# Patient Record
Sex: FEMALE | Race: BLACK OR AFRICAN AMERICAN | NOT HISPANIC OR LATINO | Employment: FULL TIME | URBAN - METROPOLITAN AREA
[De-identification: names, ages, dates, MRNs, and addresses within clinical notes are randomized per-mention and may not be internally consistent; named-entity substitution may affect disease eponyms.]

---
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
## 2019-05-29 ENCOUNTER — APPOINTMENT (EMERGENCY)
Dept: RADIOLOGY | Facility: HOSPITAL | Age: 19
End: 2019-05-29
Payer: COMMERCIAL

## 2019-05-29 ENCOUNTER — HOSPITAL ENCOUNTER (EMERGENCY)
Facility: HOSPITAL | Age: 19
Discharge: HOME | End: 2019-05-29
Attending: EMERGENCY MEDICINE | Admitting: EMERGENCY MEDICINE
Payer: COMMERCIAL

## 2019-05-29 VITALS
RESPIRATION RATE: 14 BRPM | HEART RATE: 87 BPM | DIASTOLIC BLOOD PRESSURE: 79 MMHG | TEMPERATURE: 99 F | WEIGHT: 156.8 LBS | OXYGEN SATURATION: 100 % | SYSTOLIC BLOOD PRESSURE: 126 MMHG

## 2019-05-29 DIAGNOSIS — K59.00 CONSTIPATION: ICD-10-CM

## 2019-05-29 DIAGNOSIS — R42 LIGHTHEADEDNESS: ICD-10-CM

## 2019-05-29 DIAGNOSIS — R51.9 HEADACHE: Primary | ICD-10-CM

## 2019-05-29 LAB
ALBUMIN SERPL BCP-MCNC: 3.7 G/DL (ref 3.5–5)
ALP SERPL-CCNC: 89 U/L (ref 46–116)
ALT SERPL W P-5'-P-CCNC: 28 U/L (ref 12–78)
ANION GAP SERPL CALCULATED.3IONS-SCNC: 7 MMOL/L (ref 4–13)
AST SERPL W P-5'-P-CCNC: 19 U/L (ref 5–45)
B-HCG SERPL-ACNC: <2 MIU/ML
BASOPHILS # BLD AUTO: 0.03 THOUSANDS/ΜL (ref 0–0.1)
BASOPHILS NFR BLD AUTO: 1 % (ref 0–1)
BILIRUB SERPL-MCNC: 0.2 MG/DL (ref 0.2–1)
BILIRUB UR QL STRIP: NEGATIVE
BUN SERPL-MCNC: 9 MG/DL (ref 5–25)
CALCIUM SERPL-MCNC: 9.3 MG/DL (ref 8.3–10.1)
CHLORIDE SERPL-SCNC: 102 MMOL/L (ref 100–108)
CLARITY UR: CLEAR
CO2 SERPL-SCNC: 28 MMOL/L (ref 21–32)
COLOR UR: YELLOW
CREAT SERPL-MCNC: 0.94 MG/DL (ref 0.6–1.3)
EOSINOPHIL # BLD AUTO: 0.17 THOUSAND/ΜL (ref 0–0.61)
EOSINOPHIL NFR BLD AUTO: 3 % (ref 0–6)
ERYTHROCYTE [DISTWIDTH] IN BLOOD BY AUTOMATED COUNT: 12.2 % (ref 11.6–15.1)
GFR SERPL CREATININE-BSD FRML MDRD: 101 ML/MIN/1.73SQ M
GLUCOSE SERPL-MCNC: 82 MG/DL (ref 65–140)
GLUCOSE SERPL-MCNC: 83 MG/DL (ref 65–140)
GLUCOSE UR STRIP-MCNC: NEGATIVE MG/DL
HCT VFR BLD AUTO: 43.1 % (ref 34.8–46.1)
HGB BLD-MCNC: 14.2 G/DL (ref 11.5–15.4)
HGB UR QL STRIP.AUTO: NEGATIVE
IMM GRANULOCYTES # BLD AUTO: 0.01 THOUSAND/UL (ref 0–0.2)
IMM GRANULOCYTES NFR BLD AUTO: 0 % (ref 0–2)
KETONES UR STRIP-MCNC: NEGATIVE MG/DL
LEUKOCYTE ESTERASE UR QL STRIP: NEGATIVE
LIPASE SERPL-CCNC: 110 U/L (ref 73–393)
LYMPHOCYTES # BLD AUTO: 2.82 THOUSANDS/ΜL (ref 0.6–4.47)
LYMPHOCYTES NFR BLD AUTO: 47 % (ref 14–44)
MAGNESIUM SERPL-MCNC: 2.1 MG/DL (ref 1.6–2.6)
MCH RBC QN AUTO: 28.3 PG (ref 26.8–34.3)
MCHC RBC AUTO-ENTMCNC: 32.9 G/DL (ref 31.4–37.4)
MCV RBC AUTO: 86 FL (ref 82–98)
MONOCYTES # BLD AUTO: 0.46 THOUSAND/ΜL (ref 0.17–1.22)
MONOCYTES NFR BLD AUTO: 8 % (ref 4–12)
NEUTROPHILS # BLD AUTO: 2.4 THOUSANDS/ΜL (ref 1.85–7.62)
NEUTS SEG NFR BLD AUTO: 41 % (ref 43–75)
NITRITE UR QL STRIP: NEGATIVE
NRBC BLD AUTO-RTO: 0 /100 WBCS
PH UR STRIP.AUTO: 7 [PH] (ref 4.5–8)
PLATELET # BLD AUTO: 275 THOUSANDS/UL (ref 149–390)
PMV BLD AUTO: 9.9 FL (ref 8.9–12.7)
POTASSIUM SERPL-SCNC: 3.8 MMOL/L (ref 3.5–5.3)
PROT SERPL-MCNC: 8.1 G/DL (ref 6.4–8.2)
PROT UR STRIP-MCNC: NEGATIVE MG/DL
RBC # BLD AUTO: 5.01 MILLION/UL (ref 3.81–5.12)
SODIUM SERPL-SCNC: 137 MMOL/L (ref 136–145)
SP GR UR STRIP.AUTO: 1.01 (ref 1–1.03)
UROBILINOGEN UR QL STRIP.AUTO: 0.2 E.U./DL
WBC # BLD AUTO: 5.89 THOUSAND/UL (ref 4.31–10.16)

## 2019-05-29 PROCEDURE — 83735 ASSAY OF MAGNESIUM: CPT | Performed by: PHYSICIAN ASSISTANT

## 2019-05-29 PROCEDURE — 96361 HYDRATE IV INFUSION ADD-ON: CPT

## 2019-05-29 PROCEDURE — 83690 ASSAY OF LIPASE: CPT | Performed by: PHYSICIAN ASSISTANT

## 2019-05-29 PROCEDURE — 74018 RADEX ABDOMEN 1 VIEW: CPT

## 2019-05-29 PROCEDURE — 81003 URINALYSIS AUTO W/O SCOPE: CPT

## 2019-05-29 PROCEDURE — 99284 EMERGENCY DEPT VISIT MOD MDM: CPT | Performed by: PHYSICIAN ASSISTANT

## 2019-05-29 PROCEDURE — 80053 COMPREHEN METABOLIC PANEL: CPT | Performed by: PHYSICIAN ASSISTANT

## 2019-05-29 PROCEDURE — 96375 TX/PRO/DX INJ NEW DRUG ADDON: CPT

## 2019-05-29 PROCEDURE — 36415 COLL VENOUS BLD VENIPUNCTURE: CPT | Performed by: PHYSICIAN ASSISTANT

## 2019-05-29 PROCEDURE — 84702 CHORIONIC GONADOTROPIN TEST: CPT | Performed by: PHYSICIAN ASSISTANT

## 2019-05-29 PROCEDURE — 82948 REAGENT STRIP/BLOOD GLUCOSE: CPT

## 2019-05-29 PROCEDURE — 96374 THER/PROPH/DIAG INJ IV PUSH: CPT

## 2019-05-29 PROCEDURE — 99284 EMERGENCY DEPT VISIT MOD MDM: CPT

## 2019-05-29 PROCEDURE — 85025 COMPLETE CBC W/AUTO DIFF WBC: CPT | Performed by: PHYSICIAN ASSISTANT

## 2019-05-29 RX ORDER — KETOROLAC TROMETHAMINE 30 MG/ML
15 INJECTION, SOLUTION INTRAMUSCULAR; INTRAVENOUS ONCE
Status: COMPLETED | OUTPATIENT
Start: 2019-05-29 | End: 2019-05-29

## 2019-05-29 RX ORDER — ONDANSETRON 2 MG/ML
4 INJECTION INTRAMUSCULAR; INTRAVENOUS ONCE
Status: COMPLETED | OUTPATIENT
Start: 2019-05-29 | End: 2019-05-29

## 2019-05-29 RX ORDER — IBUPROFEN 600 MG/1
600 TABLET ORAL EVERY 6 HOURS PRN
Qty: 5 TABLET | Refills: 0 | OUTPATIENT
Start: 2019-05-29 | End: 2020-08-26

## 2019-05-29 RX ORDER — DIPHENHYDRAMINE HYDROCHLORIDE 50 MG/ML
25 INJECTION INTRAMUSCULAR; INTRAVENOUS ONCE
Status: COMPLETED | OUTPATIENT
Start: 2019-05-29 | End: 2019-05-29

## 2019-05-29 RX ORDER — METOCLOPRAMIDE HYDROCHLORIDE 5 MG/ML
10 INJECTION INTRAMUSCULAR; INTRAVENOUS ONCE
Status: COMPLETED | OUTPATIENT
Start: 2019-05-29 | End: 2019-05-29

## 2019-05-29 RX ORDER — MAGNESIUM CARB/ALUMINUM HYDROX 105-160MG
296 TABLET,CHEWABLE ORAL ONCE
Status: COMPLETED | OUTPATIENT
Start: 2019-05-29 | End: 2019-05-29

## 2019-05-29 RX ORDER — POLYETHYLENE GLYCOL 3350 17 G/17G
17 POWDER, FOR SOLUTION ORAL DAILY
Qty: 85 G | Refills: 0 | Status: SHIPPED | OUTPATIENT
Start: 2019-05-29 | End: 2020-08-25

## 2019-05-29 RX ADMIN — DIPHENHYDRAMINE HYDROCHLORIDE 25 MG: 50 INJECTION, SOLUTION INTRAMUSCULAR; INTRAVENOUS at 20:56

## 2019-05-29 RX ADMIN — METOCLOPRAMIDE 10 MG: 5 INJECTION, SOLUTION INTRAMUSCULAR; INTRAVENOUS at 21:00

## 2019-05-29 RX ADMIN — KETOROLAC TROMETHAMINE 15 MG: 30 INJECTION, SOLUTION INTRAMUSCULAR at 20:59

## 2019-05-29 RX ADMIN — ONDANSETRON 4 MG: 2 INJECTION INTRAMUSCULAR; INTRAVENOUS at 19:36

## 2019-05-29 RX ADMIN — MAGNESIUM CITRATE 296 ML: 1.75 LIQUID ORAL at 21:37

## 2019-05-29 RX ADMIN — SODIUM CHLORIDE 1000 ML: 0.9 INJECTION, SOLUTION INTRAVENOUS at 19:39

## 2020-08-25 ENCOUNTER — APPOINTMENT (EMERGENCY)
Dept: RADIOLOGY | Facility: HOSPITAL | Age: 20
End: 2020-08-25
Payer: COMMERCIAL

## 2020-08-25 ENCOUNTER — HOSPITAL ENCOUNTER (EMERGENCY)
Facility: HOSPITAL | Age: 20
Discharge: HOME | End: 2020-08-26
Attending: EMERGENCY MEDICINE | Admitting: EMERGENCY MEDICINE
Payer: COMMERCIAL

## 2020-08-25 DIAGNOSIS — N39.0 UTI (URINARY TRACT INFECTION): ICD-10-CM

## 2020-08-25 DIAGNOSIS — B34.9 ACUTE VIRAL SYNDROME: Primary | ICD-10-CM

## 2020-08-25 LAB
ALBUMIN SERPL BCP-MCNC: 4.2 G/DL (ref 3.4–4.8)
ALP SERPL-CCNC: 62.3 U/L (ref 35–140)
ALT SERPL W P-5'-P-CCNC: 11 U/L (ref 5–54)
ANION GAP SERPL CALCULATED.3IONS-SCNC: 10 MMOL/L (ref 4–13)
AST SERPL W P-5'-P-CCNC: 14 U/L (ref 15–41)
BACTERIA UR QL AUTO: ABNORMAL /HPF
BASOPHILS # BLD AUTO: 0.03 THOUSANDS/ΜL (ref 0–0.1)
BASOPHILS NFR BLD AUTO: 0 % (ref 0–1)
BILIRUB SERPL-MCNC: 0.47 MG/DL (ref 0.3–1.2)
BILIRUB UR QL STRIP: ABNORMAL
BUN SERPL-MCNC: 9 MG/DL (ref 6–20)
CALCIUM SERPL-MCNC: 9.2 MG/DL (ref 8.4–10.2)
CHLORIDE SERPL-SCNC: 103 MMOL/L (ref 96–108)
CLARITY UR: CLEAR
CO2 SERPL-SCNC: 22 MMOL/L (ref 22–33)
COLOR UR: YELLOW
CREAT SERPL-MCNC: 0.94 MG/DL (ref 0.4–1.1)
EOSINOPHIL # BLD AUTO: 0.06 THOUSAND/ΜL (ref 0–0.61)
EOSINOPHIL NFR BLD AUTO: 1 % (ref 0–6)
ERYTHROCYTE [DISTWIDTH] IN BLOOD BY AUTOMATED COUNT: 12.5 % (ref 11.6–15.1)
EXT PREG TEST URINE: NEGATIVE
EXT. CONTROL ED NAV: NORMAL
GFR SERPL CREATININE-BSD FRML MDRD: 100 ML/MIN/1.73SQ M
GLUCOSE SERPL-MCNC: 95 MG/DL (ref 65–140)
GLUCOSE UR STRIP-MCNC: NEGATIVE MG/DL
HCT VFR BLD AUTO: 41.4 % (ref 34.8–46.1)
HGB BLD-MCNC: 13.8 G/DL (ref 11.5–15.4)
HGB UR QL STRIP.AUTO: NEGATIVE
IMM GRANULOCYTES # BLD AUTO: 0.02 THOUSAND/UL (ref 0–0.2)
IMM GRANULOCYTES NFR BLD AUTO: 0 % (ref 0–2)
KETONES UR STRIP-MCNC: ABNORMAL MG/DL
LACTATE SERPL-SCNC: 0.6 MMOL/L (ref 0–2)
LEUKOCYTE ESTERASE UR QL STRIP: NEGATIVE
LIPASE SERPL-CCNC: 10 U/L (ref 13–60)
LYMPHOCYTES # BLD AUTO: 1.61 THOUSANDS/ΜL (ref 0.6–4.47)
LYMPHOCYTES NFR BLD AUTO: 13 % (ref 14–44)
MCH RBC QN AUTO: 27.8 PG (ref 26.8–34.3)
MCHC RBC AUTO-ENTMCNC: 33.3 G/DL (ref 31.4–37.4)
MCV RBC AUTO: 83 FL (ref 82–98)
MONOCYTES # BLD AUTO: 1.32 THOUSAND/ΜL (ref 0.17–1.22)
MONOCYTES NFR BLD AUTO: 10 % (ref 4–12)
NEUTROPHILS # BLD AUTO: 9.84 THOUSANDS/ΜL (ref 1.85–7.62)
NEUTS SEG NFR BLD AUTO: 76 % (ref 43–75)
NITRITE UR QL STRIP: NEGATIVE
NON-SQ EPI CELLS URNS QL MICRO: ABNORMAL /HPF
PH UR STRIP.AUTO: 6.5 [PH]
PLATELET # BLD AUTO: 231 THOUSANDS/UL (ref 149–390)
PMV BLD AUTO: 10.5 FL (ref 8.9–12.7)
POTASSIUM SERPL-SCNC: 4 MMOL/L (ref 3.5–5)
PROT SERPL-MCNC: 7.4 G/DL (ref 6.4–8.3)
PROT UR STRIP-MCNC: ABNORMAL MG/DL
RBC # BLD AUTO: 4.97 MILLION/UL (ref 3.81–5.12)
RBC #/AREA URNS AUTO: ABNORMAL /HPF
SODIUM SERPL-SCNC: 135 MMOL/L (ref 133–145)
SP GR UR STRIP.AUTO: 1.02 (ref 1–1.03)
UROBILINOGEN UR QL STRIP.AUTO: 0.2 E.U./DL
WBC # BLD AUTO: 12.88 THOUSAND/UL (ref 4.31–10.16)
WBC #/AREA URNS AUTO: ABNORMAL /HPF

## 2020-08-25 PROCEDURE — 99284 EMERGENCY DEPT VISIT MOD MDM: CPT

## 2020-08-25 PROCEDURE — 81001 URINALYSIS AUTO W/SCOPE: CPT | Performed by: EMERGENCY MEDICINE

## 2020-08-25 PROCEDURE — 80053 COMPREHEN METABOLIC PANEL: CPT | Performed by: EMERGENCY MEDICINE

## 2020-08-25 PROCEDURE — 83605 ASSAY OF LACTIC ACID: CPT | Performed by: EMERGENCY MEDICINE

## 2020-08-25 PROCEDURE — 83690 ASSAY OF LIPASE: CPT | Performed by: EMERGENCY MEDICINE

## 2020-08-25 PROCEDURE — 96375 TX/PRO/DX INJ NEW DRUG ADDON: CPT

## 2020-08-25 PROCEDURE — 96361 HYDRATE IV INFUSION ADD-ON: CPT

## 2020-08-25 PROCEDURE — 81025 URINE PREGNANCY TEST: CPT | Performed by: EMERGENCY MEDICINE

## 2020-08-25 PROCEDURE — 99284 EMERGENCY DEPT VISIT MOD MDM: CPT | Performed by: EMERGENCY MEDICINE

## 2020-08-25 PROCEDURE — 87040 BLOOD CULTURE FOR BACTERIA: CPT | Performed by: EMERGENCY MEDICINE

## 2020-08-25 PROCEDURE — 71046 X-RAY EXAM CHEST 2 VIEWS: CPT

## 2020-08-25 PROCEDURE — 36415 COLL VENOUS BLD VENIPUNCTURE: CPT | Performed by: EMERGENCY MEDICINE

## 2020-08-25 PROCEDURE — 85025 COMPLETE CBC W/AUTO DIFF WBC: CPT | Performed by: EMERGENCY MEDICINE

## 2020-08-25 RX ORDER — ONDANSETRON 2 MG/ML
4 INJECTION INTRAMUSCULAR; INTRAVENOUS ONCE
Status: COMPLETED | OUTPATIENT
Start: 2020-08-25 | End: 2020-08-25

## 2020-08-25 RX ORDER — KETOROLAC TROMETHAMINE 30 MG/ML
30 INJECTION, SOLUTION INTRAMUSCULAR; INTRAVENOUS ONCE
Status: COMPLETED | OUTPATIENT
Start: 2020-08-25 | End: 2020-08-25

## 2020-08-25 RX ADMIN — ONDANSETRON 4 MG: 2 INJECTION INTRAMUSCULAR; INTRAVENOUS at 23:11

## 2020-08-25 RX ADMIN — KETOROLAC TROMETHAMINE 30 MG: 30 INJECTION, SOLUTION INTRAMUSCULAR at 23:11

## 2020-08-25 RX ADMIN — SODIUM CHLORIDE 1000 ML: 0.9 INJECTION, SOLUTION INTRAVENOUS at 23:12

## 2020-08-25 NOTE — Clinical Note
Lizeth Birch was seen and treated in our emergency department on 8/25/2020  Diagnosis:     Lizeth    She may return on this date:     May not return to work until covid test is negative     If you have any questions or concerns, please don't hesitate to call        Nery Conway MD    ______________________________           _______________          _______________  Hospital Representative                              Date                                Time

## 2020-08-25 NOTE — Clinical Note
Lizeth Birch was seen and treated in our emergency department on 8/25/2020  Diagnosis:     Lizeth    She may return on this date:     May not return to work until covid test is negative     If you have any questions or concerns, please don't hesitate to call        Shari Alford MD    ______________________________           _______________          _______________  Hospital Representative                              Date                                Time

## 2020-08-26 VITALS
BODY MASS INDEX: 28.37 KG/M2 | SYSTOLIC BLOOD PRESSURE: 124 MMHG | OXYGEN SATURATION: 98 % | WEIGHT: 154.2 LBS | HEART RATE: 72 BPM | HEIGHT: 62 IN | RESPIRATION RATE: 18 BRPM | DIASTOLIC BLOOD PRESSURE: 71 MMHG | TEMPERATURE: 97.8 F

## 2020-08-26 PROBLEM — N39.0 UTI (URINARY TRACT INFECTION): Status: ACTIVE | Noted: 2020-08-26

## 2020-08-26 PROBLEM — B34.9 ACUTE VIRAL SYNDROME: Status: ACTIVE | Noted: 2020-08-26

## 2020-08-26 PROCEDURE — 86308 HETEROPHILE ANTIBODY SCREEN: CPT | Performed by: EMERGENCY MEDICINE

## 2020-08-26 PROCEDURE — 96365 THER/PROPH/DIAG IV INF INIT: CPT

## 2020-08-26 PROCEDURE — 96361 HYDRATE IV INFUSION ADD-ON: CPT

## 2020-08-26 PROCEDURE — 36415 COLL VENOUS BLD VENIPUNCTURE: CPT | Performed by: EMERGENCY MEDICINE

## 2020-08-26 RX ORDER — IBUPROFEN 600 MG/1
600 TABLET ORAL EVERY 6 HOURS PRN
Qty: 60 TABLET | Refills: 0 | Status: SHIPPED | OUTPATIENT
Start: 2020-08-26 | End: 2020-09-23 | Stop reason: ALTCHOICE

## 2020-08-26 RX ORDER — CEFTRIAXONE 1 G/50ML
1000 INJECTION, SOLUTION INTRAVENOUS ONCE
Status: COMPLETED | OUTPATIENT
Start: 2020-08-26 | End: 2020-08-26

## 2020-08-26 RX ORDER — SULFAMETHOXAZOLE AND TRIMETHOPRIM 800; 160 MG/1; MG/1
1 TABLET ORAL EVERY 12 HOURS SCHEDULED
Qty: 14 TABLET | Refills: 0 | Status: SHIPPED | OUTPATIENT
Start: 2020-08-26 | End: 2020-08-26 | Stop reason: SDUPTHER

## 2020-08-26 RX ORDER — IBUPROFEN 600 MG/1
600 TABLET ORAL EVERY 6 HOURS PRN
Qty: 60 TABLET | Refills: 0 | Status: SHIPPED | OUTPATIENT
Start: 2020-08-26 | End: 2020-08-26 | Stop reason: SDUPTHER

## 2020-08-26 RX ORDER — SULFAMETHOXAZOLE AND TRIMETHOPRIM 800; 160 MG/1; MG/1
1 TABLET ORAL EVERY 12 HOURS SCHEDULED
Qty: 14 TABLET | Refills: 0 | Status: SHIPPED | OUTPATIENT
Start: 2020-08-26 | End: 2020-09-02

## 2020-08-26 RX ADMIN — CEFTRIAXONE 1000 MG: 1 INJECTION, SOLUTION INTRAVENOUS at 00:34

## 2020-08-26 RX ADMIN — SODIUM CHLORIDE 1000 ML: 0.9 INJECTION, SOLUTION INTRAVENOUS at 00:34

## 2020-08-26 NOTE — ED NOTES
Pt reports that nausea is resolved, no change in pain level at this time        Marisa Stweart RN  08/25/20 0059

## 2020-08-26 NOTE — ED PROVIDER NOTES
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 Taking? UNKNOWN TO PATIENT 8/24/2020 at Unknown time  Yes Yes   ibuprofen (MOTRIN) 600 mg tablet   No No   Sig: Take 1 tablet (600 mg total) by mouth every 6 (six) hours as needed for mild pain      Facility-Administered Medications: None       History reviewed  No pertinent past medical history  History reviewed  No pertinent surgical history  Family History   Problem Relation Age of Onset    No Known Problems Mother     No Known Problems Father      I have reviewed and agree with the history as documented  E-Cigarette/Vaping    E-Cigarette Use Never User      E-Cigarette/Vaping Substances     Social History     Tobacco Use    Smoking status: Never Smoker    Smokeless tobacco: Never Used   Substance Use Topics    Alcohol use: Never     Frequency: Never    Drug use: Never       Review of Systems   Constitutional: Positive for appetite change, chills, fatigue and fever  Negative for activity change, diaphoresis and unexpected weight change  HENT: Negative for congestion, ear discharge, ear pain, mouth sores, sinus pressure, sinus pain, sneezing, sore throat, trouble swallowing and voice change  Eyes: Negative for photophobia, pain, discharge, redness, itching and visual disturbance  Respiratory: Negative for cough, chest tightness and shortness of breath  Cardiovascular: Negative for chest pain, palpitations and leg swelling  Gastrointestinal: Positive for abdominal pain (upper abd) and nausea  Negative for constipation and vomiting  Endocrine: Negative for cold intolerance, heat intolerance, polydipsia, polyphagia and polyuria  Genitourinary: Negative for decreased urine volume, difficulty urinating, dysuria, frequency, hematuria and urgency  Musculoskeletal: Negative for arthralgias, back pain, gait problem, joint swelling, myalgias, neck pain and neck stiffness  Skin: Negative for color change and rash  Allergic/Immunologic: Negative for immunocompromised state  Neurological: Negative for dizziness, tremors, seizures, syncope, speech difficulty, weakness, light-headedness, numbness and headaches  Hematological: Does not bruise/bleed easily  Psychiatric/Behavioral: Negative for behavioral problems and suicidal ideas  Physical Exam  Physical Exam  Vitals signs and nursing note reviewed  Constitutional:       General: She is not in acute distress  Appearance: Normal appearance  She is well-developed and normal weight  She is ill-appearing  She is not toxic-appearing or diaphoretic  HENT:      Head: Normocephalic and atraumatic  Right Ear: Tympanic membrane, ear canal and external ear normal  There is no impacted cerumen  Left Ear: Tympanic membrane, ear canal and external ear normal  There is no impacted cerumen  Nose: Nose normal  No congestion or rhinorrhea  Mouth/Throat:      Mouth: Mucous membranes are moist       Pharynx: Oropharynx is clear   No oropharyngeal exudate or posterior oropharyngeal erythema  Eyes:      General: No scleral icterus  Right eye: No discharge  Left eye: No discharge  Extraocular Movements: Extraocular movements intact  Conjunctiva/sclera: Conjunctivae normal       Pupils: Pupils are equal, round, and reactive to light  Neck:      Musculoskeletal: Normal range of motion and neck supple  No neck rigidity or muscular tenderness  Thyroid: No thyromegaly  Vascular: No hepatojugular reflux or JVD  Trachea: No tracheal deviation  Cardiovascular:      Rate and Rhythm: Normal rate and regular rhythm  Pulses: Normal pulses  Carotid pulses are 2+ on the right side and 2+ on the left side  Radial pulses are 2+ on the right side and 2+ on the left side  Dorsalis pedis pulses are 2+ on the right side and 2+ on the left side  Posterior tibial pulses are 2+ on the right side and 2+ on the left side  Heart sounds: Normal heart sounds  No murmur  Pulmonary:      Effort: Pulmonary effort is normal  No accessory muscle usage or respiratory distress  Breath sounds: Normal breath sounds  No wheezing or rales  Chest:      Chest wall: No mass, deformity, tenderness, crepitus or edema  Abdominal:      General: Bowel sounds are normal  There is no distension or abdominal bruit  Palpations: Abdomen is soft  There is no hepatomegaly, splenomegaly or mass  Tenderness: There is no abdominal tenderness (RUQ)  There is right CVA tenderness and left CVA tenderness  There is no guarding or rebound  Hernia: No hernia is present  Musculoskeletal: Normal range of motion  General: No tenderness  Right lower leg: She exhibits no tenderness  No edema  Left lower leg: She exhibits no tenderness  No edema  Lymphadenopathy:      Cervical: No cervical adenopathy  Skin:     General: Skin is warm and dry  Capillary Refill: Capillary refill takes less than 2 seconds  Findings: No ecchymosis or rash  Neurological:      General: No focal deficit present  Mental Status: She is alert and oriented to person, place, and time  Cranial Nerves: No cranial nerve deficit  Sensory: No sensory deficit  Motor: No weakness or abnormal muscle tone  Deep Tendon Reflexes: Reflexes normal    Psychiatric:         Mood and Affect: Mood normal          Behavior: Behavior normal          Thought Content: Thought content normal          Judgment: Judgment normal          Vital Signs  ED Triage Vitals [08/25/20 2235]   Temperature Pulse Respirations Blood Pressure SpO2   (!) 100 8 °F (38 2 °C) (!) 119 18 121/68 100 %      Temp Source Heart Rate Source Patient Position - Orthostatic VS BP Location FiO2 (%)   Tympanic Monitor Lying Right arm --      Pain Score       8           Vitals:    08/25/20 2235 08/25/20 2358 08/26/20 0126   BP: 121/68 104/67 124/71   Pulse: (!) 119 95 72   Patient Position - Orthostatic VS: Lying Lying          Visual Acuity      ED Medications  Medications   sodium chloride 0 9 % bolus 1,000 mL (0 mL Intravenous Stopped 8/26/20 0031)   ketorolac (TORADOL) injection 30 mg (30 mg Intravenous Given 8/25/20 2311)   ondansetron (ZOFRAN) injection 4 mg (4 mg Intravenous Given 8/25/20 2311)   sodium chloride 0 9 % bolus 1,000 mL (0 mL Intravenous Stopped 8/26/20 0121)   cefTRIAXone (ROCEPHIN) IVPB (premix) 1,000 mg 50 mL (0 mg Intravenous Stopped 8/26/20 0108)       Diagnostic Studies  Results Reviewed     Procedure Component Value Units Date/Time    Mononucleosis screen [[id number]] Collected:  08/26/20 0009    Lab Status: In process Specimen:  Blood from Arm, Left Updated:  08/26/20 0011    Lactic acid [[id number]]  (Normal) Collected:  08/25/20 2307    Lab Status:  Final result Specimen:  Blood from Arm, Left Updated:  08/25/20 2334     LACTIC ACID 0 6 mmol/L     Narrative:       Result may be elevated if tourniquet was used during collection  Comprehensive metabolic panel [[id number]]  (Abnormal) Collected:  08/25/20 2307    Lab Status:  Final result Specimen:  Blood from Arm, Left Updated:  08/25/20 2334     Sodium 135 mmol/L      Potassium 4 0 mmol/L      Chloride 103 mmol/L      CO2 22 mmol/L      ANION GAP 10 mmol/L      BUN 9 mg/dL      Creatinine 0 94 mg/dL      Glucose 95 mg/dL      Calcium 9 2 mg/dL      AST 14 U/L      ALT 11 U/L      Alkaline Phosphatase 62 3 U/L      Total Protein 7 4 g/dL      Albumin 4 2 g/dL      Total Bilirubin 0 47 mg/dL      eGFR 100 ml/min/1 73sq m     Narrative:       National Kidney Disease Foundation guidelines for Chronic Kidney Disease (CKD):     Stage 1 with normal or high GFR (GFR > 90 mL/min/1 73 square meters)    Stage 2 Mild CKD (GFR = 60-89 mL/min/1 73 square meters)    Stage 3A Moderate CKD (GFR = 45-59 mL/min/1 73 square meters)    Stage 3B Moderate CKD (GFR = 30-44 mL/min/1 73 square meters)    Stage 4 Severe CKD (GFR = 15-29 mL/min/1 73 square meters)    Stage 5 End Stage CKD (GFR <15 mL/min/1 73 square meters)  Note: GFR calculation is accurate only with a steady state creatinine    Lipase [[id number]]  (Abnormal) Collected:  08/25/20 2307    Lab Status:  Final result Specimen:  Blood from Arm, Left Updated:  08/25/20 2334     Lipase 10 u/L     CBC and differential [[id number]]  (Abnormal) Collected:  08/25/20 2307    Lab Status:  Final result Specimen:  Blood from Arm, Left Updated:  08/25/20 2316     WBC 12 88 Thousand/uL      RBC 4 97 Million/uL      Hemoglobin 13 8 g/dL      Hematocrit 41 4 %      MCV 83 fL      MCH 27 8 pg      MCHC 33 3 g/dL      RDW 12 5 %      MPV 10 5 fL      Platelets 563 Thousands/uL      Neutrophils Relative 76 %      Immat GRANS % 0 %      Lymphocytes Relative 13 %      Monocytes Relative 10 %      Eosinophils Relative 1 %      Basophils Relative 0 %      Neutrophils Absolute 9 84 Thousands/µL      Immature Grans Absolute 0 02 Thousand/uL      Lymphocytes Absolute 1 61 Thousands/µL      Monocytes Absolute 1 32 Thousand/µL      Eosinophils Absolute 0 06 Thousand/µL      Basophils Absolute 0 03 Thousands/µL     Blood culture #2 [[id number]] Collected:  08/25/20 2314    Lab Status: In process Specimen:  Blood from Hand, Right Updated:  08/25/20 2315    Blood culture #1 [[id number]] Collected:  08/25/20 2307    Lab Status: In process Specimen:  Blood from Arm, Left Updated:  08/25/20 2314    Urine Microscopic [[id number]]  (Abnormal) Collected:  08/25/20 2247    Lab Status:  Final result Specimen:  Urine, Clean Catch Updated:  08/25/20 2307     RBC, UA None Seen /hpf      WBC, UA 0-5 /hpf      Epithelial Cells Occasional /hpf      Bacteria, UA Moderate /hpf     POCT pregnancy, urine [[id number]]  (Normal) Resulted:  08/25/20 2252    Lab Status:  Final result Updated:  08/25/20 2252     EXT PREG TEST UR (Ref: Negative) negative     Control valid    UA w Reflex to Microscopic w Reflex to Culture [[id number]]  (Abnormal) Collected:  08/25/20 2247    Lab Status:  Final result Specimen:  Urine, Clean Catch Updated:  08/25/20 2252     Color, UA Yellow     Clarity, UA Clear     Specific [place], UA 1 020     pH, UA 6 5     Leukocytes, UA Negative     Nitrite, UA Negative     Protein, UA Trace mg/dl      Glucose, UA Negative mg/dl      Ketones, UA 40 (2+) mg/dl      Urobilinogen, UA 0 2 E U /dl      Bilirubin, UA 1+     Blood, UA Negative                 XR chest 2 views   Final Result by Filippo Jarrell MD (08/26 0059)      No acute cardiopulmonary disease  Workstation performed: [id number]                    Procedures  Procedures         ED Course  ED Course as of Aug 26 0700   Wed Aug 26, 2020   5571 This 23year old female presented with fever and tachycardia - bilateral CVA tenderness on exam  Had COVID test earlier today at [facility] - results pending      UA shows UTI - moderate bacteria - started on Bactrim after giving initial dose of Rocephin IV    Medicated with Toradol - temp improved to 97 8  Hydrated with 2 liters of NS  - ketones in urine    Leukocytosis at 12 88  H/H stable    Blood cxs pending        Lactic acid normal at 0 6  Mono screen pending (is a send out)    Reeval after fluids and afebrile - no longer tachycardic    To follow up with PCP or return to the ED if she does not improve  Per employer - she must be off work until [place] result is resulted                                                MDM  Number of Diagnoses or Management Options  Diagnosis management comments: COVID, UTI,pyelo, viral syndrome, PNA, gallbladder disease, pancreatitis       Amount and/or Complexity of Data Reviewed  Clinical lab tests: ordered  Tests in the radiology section of CPT®: ordered  Obtain history from someone other than the patient: yes (RN and 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)  Review and summarize past medical records: yes    Risk of Complications, Morbidity, and/or Mortality  Presenting problems: moderate  Diagnostic procedures: low          Disposition  Final diagnoses:   Acute viral syndrome   UTI (urinary tract infection)     Time reflects when diagnosis was documented in both MDM as applicable and the Disposition within this note     Time User Action Codes Description Comment    8/26/2020  1:13 AM Asia Quintanilla [B34 9] Acute viral syndrome     8/26/2020  1:13 AM Asia Saucedo Add [N39 0] UTI (urinary tract infection)       ED Disposition     ED Disposition Condition Date/Time Comment    Discharge Stable Wed Aug 26, 2020  1:13 AM Lizeth Birch discharge to home/self care              Follow-up Information    None         Discharge Medication List as of 8/26/2020  1:23 AM      CONTINUE these medications which have CHANGED    Details   ibuprofen (MOTRIN) 600 mg tablet Take 1 tablet (600 mg total) by mouth every 6 (six) hours as needed for fever, Starting Wed 8/26/2020, Normal      sulfamethoxazole-trimethoprim (BACTRIM DS) 800-160 mg per tablet Take 1 tablet by mouth every 12 (twelve) hours for 7 days smx-tmp DS (BACTRIM) 800-160 mg tabs (1tab q12 D10), Starting Wed 8/26/2020, Until Wed 9/2/2020, Normal         CONTINUE these medications which have NOT CHANGED    Details   UNKNOWN TO PATIENT Historical Med               PDMP Review     None          ED Provider  Electronically Signed by           Berto Cloud MD  08/26/20 0700

## 2020-08-27 LAB — HETEROPH AB SER QL: NEGATIVE

## 2020-08-31 LAB
BACTERIA BLD CULT: NORMAL
BACTERIA BLD CULT: NORMAL

## 2020-09-22 RX ORDER — NORETHINDRONE ACETATE AND ETHINYL ESTRADIOL 1.5-30(21)
1 KIT ORAL DAILY
COMMUNITY
Start: 2020-06-24 | End: 2021-02-03

## 2020-09-23 ENCOUNTER — OFFICE VISIT (OUTPATIENT)
Dept: FAMILY MEDICINE CLINIC | Facility: CLINIC | Age: 20
End: 2020-09-23
Payer: COMMERCIAL

## 2020-09-23 VITALS
SYSTOLIC BLOOD PRESSURE: 120 MMHG | DIASTOLIC BLOOD PRESSURE: 76 MMHG | HEIGHT: 63 IN | TEMPERATURE: 99.4 F | OXYGEN SATURATION: 99 % | RESPIRATION RATE: 16 BRPM | WEIGHT: 152 LBS | HEART RATE: 86 BPM | BODY MASS INDEX: 26.93 KG/M2

## 2020-09-23 DIAGNOSIS — Z28.21 HUMAN PAPILLOMA VIRUS (HPV) VACCINATION DECLINED: ICD-10-CM

## 2020-09-23 DIAGNOSIS — Z71.85 HPV VACCINE COUNSELING: ICD-10-CM

## 2020-09-23 DIAGNOSIS — Z76.89 ENCOUNTER TO ESTABLISH CARE: Primary | ICD-10-CM

## 2020-09-23 DIAGNOSIS — Z00.00 ANNUAL PHYSICAL EXAM: ICD-10-CM

## 2020-09-23 DIAGNOSIS — Z28.21 INFLUENZA VACCINATION DECLINED BY PATIENT: ICD-10-CM

## 2020-09-23 DIAGNOSIS — F32.A DEPRESSION, UNSPECIFIED DEPRESSION TYPE: ICD-10-CM

## 2020-09-23 PROCEDURE — 3725F SCREEN DEPRESSION PERFORMED: CPT | Performed by: FAMILY MEDICINE

## 2020-09-23 PROCEDURE — 99385 PREV VISIT NEW AGE 18-39: CPT | Performed by: FAMILY MEDICINE

## 2020-09-23 PROCEDURE — 1036F TOBACCO NON-USER: CPT | Performed by: FAMILY MEDICINE

## 2020-09-23 PROCEDURE — 99212 OFFICE O/P EST SF 10 MIN: CPT | Performed by: FAMILY MEDICINE

## 2020-09-23 NOTE — PROGRESS NOTES
Assessment/Plan:   Diagnoses and all orders for this visit:    Encounter to establish care  -     Ambulatory referral to [facility]  Annual physical exam  - reviewed PMHx, PSHx, meds, allergies, FHx, Soc Hx and Sexual Hx   - declined Flu and HPV series in the office today   - advised to get IMMs record from her former PCP - Dr Paris Moreno   - UTD with annual GYN exam and PAP  - declined STD screening in the office today   - discussed diet and exercise   - UTD with Dental   Influenza vaccination declined by patient  HPV vaccine counseling  Human papilloma virus (HPV) vaccination declined    Depression, unspecified depression type  -     Ambulatory referral to [facility]; Future  -     TSH, 3rd generation with Free T4 reflex  - PHQ-9 score of 11 in the office today   - denies SI/HI  - pt declined medical intervention and does not want to be started on antidepressants  - does follow with a therapist but would like to switch to a new one - referred to in-office therapist, [facility] and [facility]   - advised to take Vit D 2000IU QD   - RTO in 1month for f/u - pt aware and agreeable     Other orders  -     norethindrone-ethinyl estradiol-iron (MICROGESTIN FE1 5/30) 1 5-30 MG-MCG tablet; Take 1 tablet by mouth daily  -     Cancel: HPV VACCINE 9 VALENT IM  -     Cancel: HIV 1/2 Antigen/Antibody (4th Generation) w Reflex [facility]; Future  -     Cancel: TDAP VACCINE GREATER THAN OR EQUAL TO 6YO IM  -     Cancel: influenza vaccine, quadrivalent, 0 5 mL, preservative-free, for adult and pediatric patients 6 mos+ (AFLURIA, FLUARIX, FLULAVAL, FLUZONE)  -     Cancel: Ambulatory referral to Gynecology; Future          Subjective:    Patient ID: Pedrito Nesbitt is a 23 y o  female    Pedrito Nesbitt is a 23 y o  female who presents to the office to establish care and for an annual exam  1) Annual   - prior PCP: Dr Paris Moreno   - PMHx: none   - allergies: NKDA  - Meds: OCPs  - PSHx: none   - FHx: PGF (DM, Cancer), denies FHx of cervical, colon, ovarian, uterine ca or sudden cardiac death  - Immunizations: pending from former PCP - unsure of HPV series, declined Flu vaccine in the office today   - GYN Hx: [facility], last annual with PAP was 6/24/202, FDLMP: 8/17/2020   - Hm: EGD/Cscope 3/2020 - Dr Cari Rogel - gastritis   - diet/exercise: exercise: GYM, diet: "anything and everything"   - social: denies tob/illicts, drinks 1x/week    - sexual Hx: sexually active with BF, on OCPs, declined STD screening   - last vision: does not wear glasses or contacts   - last dental: has Invisalin   - ROS: today in the office pt denies F/C/N/V/HA/visual changes/CP/palpitations/SOB/wheezing/abd pain/D/C/LE edema or calf tenderness  2) Depression/PTSD  - currently has a therapist ([facility]) but does not like current one and interested in switching   - PHQ-9 score of 11   - denies SI/HI - has had SI in the past but no plan  - has never been on medications and declined in the office today  - interested in getting paperwork to have a therapy pet   - no recent TSH or Vit D   - does not take MVI       The following portions of the patient's history were reviewed and updated as appropriate: allergies, current medications, past family history, past medical history, past social history, past surgical history and problem list     Review of Systems  as per HPI    Objective:  /76 (BP Location: Right arm, Patient Position: Sitting, Cuff Size: Standard)   Pulse 86   Temp 99 4 °F (37 4 °C) (Tympanic)   Resp 16   Ht 5' 3" (1 6 m)   Wt 68 9 kg (152 lb)   SpO2 99%   BMI 26 93 kg/m²    Physical Exam  Vitals signs reviewed  Constitutional:       General: She is not in acute distress  Appearance: She is not ill-appearing, toxic-appearing or diaphoretic  HENT:      Head: Normocephalic and atraumatic  Right Ear: Tympanic membrane, ear canal and external ear normal  There is no impacted cerumen  Left Ear: Tympanic membrane, ear canal and external ear normal  There is no impacted cerumen  Nose: Nose normal  No congestion or rhinorrhea  Mouth/Throat:      Mouth: Mucous membranes are moist       Pharynx: Oropharynx is clear  No oropharyngeal exudate or posterior oropharyngeal erythema  Eyes:      General: No scleral icterus  Right eye: No discharge  Left eye: No discharge  Extraocular Movements: Extraocular movements intact  Conjunctiva/sclera: Conjunctivae normal       Pupils: Pupils are equal, round, and reactive to light  Neck:      Musculoskeletal: Normal range of motion  Cardiovascular:      Rate and Rhythm: Normal rate  Heart sounds: Normal heart sounds  No murmur  No friction rub  No gallop  Pulmonary:      Effort: Pulmonary effort is normal  No respiratory distress  Breath sounds: Normal breath sounds  No stridor  No wheezing, rhonchi or rales  Abdominal:      General: Bowel sounds are normal  There is no distension  Palpations: Abdomen is soft  Tenderness: There is no abdominal tenderness  Comments: BMI 27   Musculoskeletal: Normal range of motion  General: No swelling, tenderness, deformity or signs of injury  Right lower leg: No edema  Left lower leg: No edema  Skin:     General: Skin is warm  Neurological:      General: No focal deficit present  Mental Status: She is alert and oriented to person, place, and time  Psychiatric:         Attention and Perception: Attention normal          Mood and Affect: Affect normal  Mood is depressed  Speech: Speech normal          Behavior: Behavior normal          Thought Content: Thought content normal  Thought content does not include homicidal or suicidal ideation  Thought content does not include homicidal or suicidal plan           Cognition and Memory: Cognition and memory normal          Judgment: Judgment normal       Comments: PHQ-9 score of 11 in the office today          BMI Counseling: Body mass index is 26 93 kg/m²  The BMI is above normal  Nutrition recommendations include reducing portion sizes and decreasing overall calorie intake  Exercise recommendations include moderate aerobic physical activity for 150 minutes/week, exercising 3-5 times per week and joining a gym

## 2020-09-23 NOTE — PATIENT INSTRUCTIONS

## 2020-09-23 NOTE — PROGRESS NOTES
Assessment/Plan:   Diagnoses and all orders for this visit:    Encounter to establish care  -     Ambulatory referral to Family Practice    Depression, unspecified depression type  -     Ambulatory referral to [facility]; Future  -     TSH, 3rd generation with Free T4 reflex  - PHQ-9 score of 11 in the office today   - denies SI/HI  - no FHx of Depression/Anxiety   - pt declined medical intervention and does not want to be started on antidepressants  - does follow with a therapist but would like to switch to a new one - referred to in-office therapist, [facility] and [facility]   - advised to take Vit D 2000IU QD   - RTO in 1month for f/u - pt aware and agreeable     Other orders  -     norethindrone-ethinyl estradiol-iron (MICROGESTIN FE1 5/30) 1 5-30 MG-MCG tablet; Take 1 tablet by mouth daily  -     Cancel: HPV VACCINE 9 VALENT IM  -     Cancel: HIV 1/2 Antigen/Antibody (4th Generation) w Reflex [facility]; Future  -     Cancel: TDAP VACCINE GREATER THAN OR EQUAL TO 8YO IM  -     Cancel: influenza vaccine, quadrivalent, 0 5 mL, preservative-free, for adult and pediatric patients 6 mos+ (AFLURIA, FLUARIX, FLULAVAL, FLUZONE)  -     Cancel: Ambulatory referral to Gynecology; Future          Subjective:    Patient ID: Slime Valle is a 23 y o  female    Slime Valle is a 23 y o  female who presents to the office to establish care and for an annual exam  1) Annual   - prior PCP: Dr Kelly Reid   - PMHx: none   - allergies: NKDA  - Meds: OCPs  - PSHx: none   - FHx: PGF (DM, Cancer), denies FHx of cervical, colon, ovarian, uterine ca or sudden cardiac death  - Immunizations: pending from former PCP - unsure of HPV series, declined Flu vaccine in the office today   - GYN Hx: [facility] - [facility], last annual with PAP was 6/24/202, FDLMP: 8/17/2020   - Hm: EGD/Cscope 3/2020 - Dr Sadaf Putnam - gastritis   - diet/exercise: exercise: GYM, diet: "anything and everything"   - social: denies tob/illicts, drinks 1x/week    - sexual Hx: sexually active with BF, on OCPs, declined STD screening   - last vision: does not wear glasses or contacts   - last dental: has Invisalin   - ROS: today in the office pt denies F/C/N/V/HA/visual changes/CP/palpitations/SOB/wheezing/abd pain/D/C/LE edema or calf tenderness  2) Depression/PTSD  - currently has a therapist (Randall Chino) but does not like current one and interested in switching   - PHQ-9 score of 11   - denies SI/HI - has had SI in the past but no plan  - has never been on medications and declined in the office today  - interested in getting paperwork to have a therapy pet   - no recent TSH or Vit D   - does not take MVI       The following portions of the patient's history were reviewed and updated as appropriate: allergies, current medications, past family history, past medical history, past social history, past surgical history and problem list     Review of Systems  as per HPI    Objective:  /76 (BP Location: Right arm, Patient Position: Sitting, Cuff Size: Standard)   Pulse 86   Temp 99 4 °F (37 4 °C) (Tympanic)   Resp 16   Ht 5' 3" (1 6 m)   Wt 68 9 kg (152 lb)   SpO2 99%   BMI 26 93 kg/m²    Physical Exam  Vitals signs reviewed  Constitutional:       General: She is not in acute distress  Appearance: She is not ill-appearing, toxic-appearing or diaphoretic  HENT:      Head: Normocephalic and atraumatic  Right Ear: Tympanic membrane, ear canal and external ear normal  There is no impacted cerumen  Left Ear: Tympanic membrane, ear canal and external ear normal  There is no impacted cerumen  Nose: Nose normal  No congestion or rhinorrhea  Mouth/Throat:      Mouth: Mucous membranes are moist       Pharynx: Oropharynx is clear  No oropharyngeal exudate or posterior oropharyngeal erythema  Eyes:      General: No scleral icterus  Right eye: No discharge  Left eye: No discharge  Extraocular Movements: Extraocular movements intact  Conjunctiva/sclera: Conjunctivae normal       Pupils: Pupils are equal, round, and reactive to light  Neck:      Musculoskeletal: Normal range of motion  Cardiovascular:      Rate and Rhythm: Normal rate  Heart sounds: Normal heart sounds  No murmur  No friction rub  No gallop  Pulmonary:      Effort: Pulmonary effort is normal  No respiratory distress  Breath sounds: Normal breath sounds  No stridor  No wheezing, rhonchi or rales  Abdominal:      General: Bowel sounds are normal  There is no distension  Palpations: Abdomen is soft  Tenderness: There is no abdominal tenderness  Comments: BMI 27   Musculoskeletal: Normal range of motion  General: No swelling, tenderness, deformity or signs of injury  Right lower leg: No edema  Left lower leg: No edema  Skin:     General: Skin is warm  Neurological:      General: No focal deficit present  Mental Status: She is alert and oriented to person, place, and time  Psychiatric:         Attention and Perception: Attention normal          Mood and Affect: Affect normal  Mood is depressed  Speech: Speech normal          Behavior: Behavior normal          Thought Content: Thought content normal  Thought content does not include homicidal or suicidal ideation  Thought content does not include homicidal or suicidal plan  Cognition and Memory: Cognition and memory normal          Judgment: Judgment normal       Comments: PHQ-9 score of 11 in the office today          BMI Counseling: Body mass index is 26 93 kg/m²  The BMI is above normal  Nutrition recommendations include reducing portion sizes and decreasing overall calorie intake  Exercise recommendations include moderate aerobic physical activity for 150 minutes/week, exercising 3-5 times per week and joining a gym

## 2020-09-30 ENCOUNTER — APPOINTMENT (OUTPATIENT)
Dept: LAB | Facility: CLINIC | Age: 20
End: 2020-09-30
Payer: COMMERCIAL

## 2020-09-30 ENCOUNTER — TRANSCRIBE ORDERS (OUTPATIENT)
Dept: LAB | Facility: CLINIC | Age: 20
End: 2020-09-30

## 2020-09-30 LAB — TSH SERPL DL<=0.05 MIU/L-ACNC: 0.39 UIU/ML (ref 0.36–3.74)

## 2020-09-30 PROCEDURE — 84443 ASSAY THYROID STIM HORMONE: CPT | Performed by: FAMILY MEDICINE

## 2020-09-30 PROCEDURE — 36415 COLL VENOUS BLD VENIPUNCTURE: CPT | Performed by: FAMILY MEDICINE

## 2020-10-28 ENCOUNTER — OFFICE VISIT (OUTPATIENT)
Dept: FAMILY MEDICINE CLINIC | Facility: CLINIC | Age: 20
End: 2020-10-28
Payer: COMMERCIAL

## 2020-10-28 VITALS
HEART RATE: 74 BPM | SYSTOLIC BLOOD PRESSURE: 116 MMHG | OXYGEN SATURATION: 98 % | DIASTOLIC BLOOD PRESSURE: 72 MMHG | HEIGHT: 63 IN | RESPIRATION RATE: 16 BRPM | WEIGHT: 150 LBS | TEMPERATURE: 98.2 F | BODY MASS INDEX: 26.58 KG/M2

## 2020-10-28 DIAGNOSIS — F32.A DEPRESSION, UNSPECIFIED DEPRESSION TYPE: Primary | ICD-10-CM

## 2020-10-28 DIAGNOSIS — Z28.21 INFLUENZA VACCINATION DECLINED BY PATIENT: ICD-10-CM

## 2020-10-28 PROCEDURE — 99213 OFFICE O/P EST LOW 20 MIN: CPT | Performed by: FAMILY MEDICINE

## 2020-10-28 PROCEDURE — 3008F BODY MASS INDEX DOCD: CPT | Performed by: FAMILY MEDICINE

## 2020-11-12 ENCOUNTER — OFFICE VISIT (OUTPATIENT)
Dept: URGENT CARE | Facility: CLINIC | Age: 20
End: 2020-11-12
Payer: COMMERCIAL

## 2020-11-12 VITALS
OXYGEN SATURATION: 98 % | RESPIRATION RATE: 20 BRPM | HEART RATE: 139 BPM | TEMPERATURE: 97.2 F | BODY MASS INDEX: 26.68 KG/M2 | HEIGHT: 62 IN | WEIGHT: 145 LBS

## 2020-11-12 DIAGNOSIS — J06.9 VIRAL UPPER RESPIRATORY TRACT INFECTION: Primary | ICD-10-CM

## 2020-11-12 PROCEDURE — 87637 SARSCOV2&INF A&B&RSV AMP PRB: CPT

## 2020-11-12 PROCEDURE — 99203 OFFICE O/P NEW LOW 30 MIN: CPT | Performed by: NURSE PRACTITIONER

## 2020-11-14 LAB
FLUAV RNA NPH QL NAA+PROBE: NOT DETECTED
FLUBV RNA NPH QL NAA+PROBE: NOT DETECTED
RSV RNA NPH QL NAA+PROBE: NOT DETECTED
SARS-COV-2 RNA NPH QL NAA+PROBE: NOT DETECTED

## 2021-02-03 ENCOUNTER — OFFICE VISIT (OUTPATIENT)
Dept: FAMILY MEDICINE CLINIC | Facility: CLINIC | Age: 21
End: 2021-02-03
Payer: COMMERCIAL

## 2021-02-03 VITALS
OXYGEN SATURATION: 97 % | BODY MASS INDEX: 27.23 KG/M2 | SYSTOLIC BLOOD PRESSURE: 106 MMHG | WEIGHT: 148 LBS | TEMPERATURE: 98.2 F | HEART RATE: 95 BPM | RESPIRATION RATE: 16 BRPM | HEIGHT: 62 IN | DIASTOLIC BLOOD PRESSURE: 68 MMHG

## 2021-02-03 DIAGNOSIS — G43.109 MIGRAINE WITH AURA AND WITHOUT STATUS MIGRAINOSUS, NOT INTRACTABLE: Primary | ICD-10-CM

## 2021-02-03 DIAGNOSIS — F32.A DEPRESSION, UNSPECIFIED DEPRESSION TYPE: ICD-10-CM

## 2021-02-03 DIAGNOSIS — Z28.21 INFLUENZA VACCINATION DECLINED BY PATIENT: ICD-10-CM

## 2021-02-03 PROCEDURE — 3008F BODY MASS INDEX DOCD: CPT | Performed by: FAMILY MEDICINE

## 2021-02-03 PROCEDURE — 1036F TOBACCO NON-USER: CPT | Performed by: FAMILY MEDICINE

## 2021-02-03 PROCEDURE — 99214 OFFICE O/P EST MOD 30 MIN: CPT | Performed by: FAMILY MEDICINE

## 2021-02-03 RX ORDER — AMITRIPTYLINE HYDROCHLORIDE 25 MG/1
25 TABLET, FILM COATED ORAL
Qty: 30 TABLET | Refills: 2 | Status: SHIPPED | OUTPATIENT
Start: 2021-02-03 | End: 2021-04-28

## 2021-02-03 RX ORDER — SUMATRIPTAN 25 MG/1
TABLET, FILM COATED ORAL
Qty: 30 TABLET | Refills: 0 | Status: SHIPPED | OUTPATIENT
Start: 2021-02-03 | End: 2021-10-27 | Stop reason: ALTCHOICE

## 2021-02-03 NOTE — PROGRESS NOTES
Assessment/Plan:   Diagnoses and all orders for this visit:    Migraine with aura and without status migrainosus, not intractable  -     Vitamin B12; Future  -     Vitamin D 25 hydroxy; Future  -     Comprehensive metabolic panel; Future  -     CBC and differential; Future  -     TSH, 3rd generation with T4 reflex; Future  -     amitriptyline (ELAVIL) 25 mg tablet; Take 1 tablet (25 mg total) by mouth daily at bedtime  -     SUMAtriptan (IMITREX) 25 mg tablet; Take 1 dose PRN migrane - may repeat dose x1 after 2hrs if no relief  - advise to STOP OCPs as there is an increased risk of strokes in pts with migraines with aura who are on OCPs - discussed alternative methods of contraception (mini-pill, nexplanon or IUD) - for now advised to use condoms with every sexual encounter   - advised to get labs   - will do trial of TCA and Imitrex  - RTO in 1month for f/u - pt aware and agreeable     Depression, unspecified depression type  - PHQ-9 score of 10 (10/28/2020)   - denies SI/HI   - no FHx of Depression/Anxiety   - does follow with a therapist but would like to switch to a new one - referred to in-office therapist, [email or web address] and [facility]   - advised to take Vit D 2000IU QD   - TSH low/nml - script given     Influenza vaccination declined by patient  Other orders  -     Cancel: influenza vaccine, quadrivalent, 0 5 mL, preservative-free, for adult and pediatric patients 6 mos+ (AFLURIA, FLUARIX, FLULAVAL, FLUZONE)          Subjective:    Patient ID: Divya Chairez is a 23 y o  female    HPI   - (+) L-sided migraines   - has been ongoing for the past 1-2yrs - used to get occasional HA but now Qwk   - (+) photophobia which then progresses into migraine   - comes on at night and sometimes wakes up in the AM with migraine   - (+) nausea/flashes of light  - (+) starts in L-eye and feels like someone is squeezing back of eye and also a burning sensation   - does have glasses which she wears intermittently (higher prescription in L-eye)   - was examined by Optho - dry eyes   - alleviated by Excedrin   - was able to get her emotional support dog   - has not followed with therapist or psych   - last PHQ-9 score (10/28/2020) was 10 - essentially unchanged from prior  - has been on OCPs since 16yo   - (+) FHx of migraines in grandmother       The following portions of the patient's history were reviewed and updated as appropriate: allergies, current medications, past family history, past medical history, past social history, past surgical history and problem list     Review of Systems  as per HPI    Objective:  /68 (BP Location: Left arm, Patient Position: Sitting, Cuff Size: Standard)   Pulse 95   Temp 98 2 °F (36 8 °C) (Tympanic)   Resp 16   Ht 5' 2" (1 575 m)   Wt 67 1 kg (148 lb)   SpO2 97%   BMI 27 07 kg/m²    Physical Exam  Vitals signs reviewed  Constitutional:       General: She is not in acute distress  Appearance: She is not ill-appearing, toxic-appearing or diaphoretic  HENT:      Head: Normocephalic and atraumatic  Right Ear: External ear normal       Left Ear: External ear normal    Eyes:      General: No scleral icterus  Right eye: No discharge  Left eye: No discharge  Extraocular Movements: Extraocular movements intact  Conjunctiva/sclera: Conjunctivae normal    Neck:      Musculoskeletal: Normal range of motion  Cardiovascular:      Rate and Rhythm: Normal rate and regular rhythm  Heart sounds: Normal heart sounds  No murmur  No friction rub  No gallop  Pulmonary:      Effort: Pulmonary effort is normal  No respiratory distress  Breath sounds: Normal breath sounds  No stridor  No wheezing, rhonchi or rales  Abdominal:      General: Bowel sounds are normal    Musculoskeletal: Normal range of motion  General: No swelling, tenderness, deformity or signs of injury  Right lower leg: No edema  Left lower leg: No edema     Skin: General: Skin is warm  Neurological:      General: No focal deficit present  Mental Status: She is alert and oriented to person, place, and time  BMI Counseling: Body mass index is 27 07 kg/m²  The BMI is above normal  Nutrition recommendations include reducing portion sizes and decreasing overall calorie intake  Exercise recommendations include moderate aerobic physical activity for 150 minutes/week, exercising 3-5 times per week, joining a gym and strength training exercises  Depression Screening Follow-up Plan: Patient's depression screening was positive with a PHQ-2 score of   Their PHQ-9 score was   Patient assessed for underlying major depression  They have no active suicidal ideations  Brief counseling provided and recommend additional follow-up/re-evaluation next office visit

## 2021-02-09 ENCOUNTER — TRANSCRIBE ORDERS (OUTPATIENT)
Dept: LAB | Facility: CLINIC | Age: 21
End: 2021-02-09

## 2021-02-09 ENCOUNTER — LAB (OUTPATIENT)
Dept: LAB | Facility: CLINIC | Age: 21
End: 2021-02-09
Payer: COMMERCIAL

## 2021-02-09 DIAGNOSIS — F32.A DEPRESSION, UNSPECIFIED DEPRESSION TYPE: ICD-10-CM

## 2021-02-09 DIAGNOSIS — G43.109 MIGRAINE WITH AURA AND WITHOUT STATUS MIGRAINOSUS, NOT INTRACTABLE: ICD-10-CM

## 2021-02-09 LAB
25(OH)D3 SERPL-MCNC: 26.1 NG/ML (ref 30–100)
ALBUMIN SERPL BCP-MCNC: 3.8 G/DL (ref 3.5–5)
ALP SERPL-CCNC: 58 U/L (ref 46–116)
ALT SERPL W P-5'-P-CCNC: 20 U/L (ref 12–78)
ANION GAP SERPL CALCULATED.3IONS-SCNC: 5 MMOL/L (ref 4–13)
AST SERPL W P-5'-P-CCNC: 19 U/L (ref 5–45)
BASOPHILS # BLD AUTO: 0.03 THOUSANDS/ΜL (ref 0–0.1)
BASOPHILS NFR BLD AUTO: 1 % (ref 0–1)
BILIRUB SERPL-MCNC: 0.58 MG/DL (ref 0.2–1)
BUN SERPL-MCNC: 12 MG/DL (ref 5–25)
CALCIUM SERPL-MCNC: 9.3 MG/DL (ref 8.3–10.1)
CHLORIDE SERPL-SCNC: 106 MMOL/L (ref 100–108)
CO2 SERPL-SCNC: 27 MMOL/L (ref 21–32)
CREAT SERPL-MCNC: 1.04 MG/DL (ref 0.6–1.3)
EOSINOPHIL # BLD AUTO: 0.08 THOUSAND/ΜL (ref 0–0.61)
EOSINOPHIL NFR BLD AUTO: 2 % (ref 0–6)
ERYTHROCYTE [DISTWIDTH] IN BLOOD BY AUTOMATED COUNT: 12.6 % (ref 11.6–15.1)
GFR SERPL CREATININE-BSD FRML MDRD: 89 ML/MIN/1.73SQ M
GLUCOSE P FAST SERPL-MCNC: 90 MG/DL (ref 65–99)
HCT VFR BLD AUTO: 42.2 % (ref 34.8–46.1)
HGB BLD-MCNC: 13.7 G/DL (ref 11.5–15.4)
IMM GRANULOCYTES # BLD AUTO: 0.01 THOUSAND/UL (ref 0–0.2)
IMM GRANULOCYTES NFR BLD AUTO: 0 % (ref 0–2)
LYMPHOCYTES # BLD AUTO: 2.48 THOUSANDS/ΜL (ref 0.6–4.47)
LYMPHOCYTES NFR BLD AUTO: 46 % (ref 14–44)
MCH RBC QN AUTO: 28.7 PG (ref 26.8–34.3)
MCHC RBC AUTO-ENTMCNC: 32.5 G/DL (ref 31.4–37.4)
MCV RBC AUTO: 88 FL (ref 82–98)
MONOCYTES # BLD AUTO: 0.47 THOUSAND/ΜL (ref 0.17–1.22)
MONOCYTES NFR BLD AUTO: 9 % (ref 4–12)
NEUTROPHILS # BLD AUTO: 2.24 THOUSANDS/ΜL (ref 1.85–7.62)
NEUTS SEG NFR BLD AUTO: 42 % (ref 43–75)
NRBC BLD AUTO-RTO: 0 /100 WBCS
PLATELET # BLD AUTO: 270 THOUSANDS/UL (ref 149–390)
PMV BLD AUTO: 10.5 FL (ref 8.9–12.7)
POTASSIUM SERPL-SCNC: 4.3 MMOL/L (ref 3.5–5.3)
PROT SERPL-MCNC: 7.9 G/DL (ref 6.4–8.2)
RBC # BLD AUTO: 4.78 MILLION/UL (ref 3.81–5.12)
SODIUM SERPL-SCNC: 138 MMOL/L (ref 136–145)
TSH SERPL DL<=0.05 MIU/L-ACNC: 0.56 UIU/ML (ref 0.36–3.74)
VIT B12 SERPL-MCNC: 435 PG/ML (ref 100–900)
WBC # BLD AUTO: 5.31 THOUSAND/UL (ref 4.31–10.16)

## 2021-02-09 PROCEDURE — 80053 COMPREHEN METABOLIC PANEL: CPT

## 2021-02-09 PROCEDURE — 36415 COLL VENOUS BLD VENIPUNCTURE: CPT

## 2021-02-09 PROCEDURE — 82607 VITAMIN B-12: CPT

## 2021-02-09 PROCEDURE — 84443 ASSAY THYROID STIM HORMONE: CPT

## 2021-02-09 PROCEDURE — 85025 COMPLETE CBC W/AUTO DIFF WBC: CPT

## 2021-02-09 PROCEDURE — 82306 VITAMIN D 25 HYDROXY: CPT

## 2021-04-27 DIAGNOSIS — G43.109 MIGRAINE WITH AURA AND WITHOUT STATUS MIGRAINOSUS, NOT INTRACTABLE: ICD-10-CM

## 2021-04-28 RX ORDER — AMITRIPTYLINE HYDROCHLORIDE 25 MG/1
TABLET, FILM COATED ORAL
Qty: 30 TABLET | Refills: 0 | Status: SHIPPED | OUTPATIENT
Start: 2021-04-28 | End: 2021-10-27 | Stop reason: ALTCHOICE

## 2021-05-12 DIAGNOSIS — G43.109 MIGRAINE WITH AURA AND WITHOUT STATUS MIGRAINOSUS, NOT INTRACTABLE: ICD-10-CM

## 2021-05-12 RX ORDER — AMITRIPTYLINE HYDROCHLORIDE 25 MG/1
TABLET, FILM COATED ORAL
Qty: 90 TABLET | Refills: 1 | OUTPATIENT
Start: 2021-05-12

## 2021-07-28 ENCOUNTER — IMMUNIZATIONS (OUTPATIENT)
Dept: FAMILY MEDICINE CLINIC | Facility: MEDICAL CENTER | Age: 21
End: 2021-07-28

## 2021-07-28 DIAGNOSIS — Z23 ENCOUNTER FOR IMMUNIZATION: Primary | ICD-10-CM

## 2021-07-28 PROCEDURE — 91300 SARS-COV-2 / COVID-19 MRNA VACCINE (PFIZER-BIONTECH) 30 MCG: CPT

## 2021-08-18 ENCOUNTER — IMMUNIZATIONS (OUTPATIENT)
Dept: FAMILY MEDICINE CLINIC | Facility: MEDICAL CENTER | Age: 21
End: 2021-08-18

## 2021-08-18 DIAGNOSIS — Z23 ENCOUNTER FOR IMMUNIZATION: Primary | ICD-10-CM

## 2021-08-18 PROCEDURE — 91300 SARSCOV2 VAC 30MCG/0.3ML IM: CPT

## 2021-09-09 PROCEDURE — U0005 INFEC AGEN DETEC AMPLI PROBE: HCPCS | Performed by: FAMILY MEDICINE

## 2021-09-09 PROCEDURE — U0003 INFECTIOUS AGENT DETECTION BY NUCLEIC ACID (DNA OR RNA); SEVERE ACUTE RESPIRATORY SYNDROME CORONAVIRUS 2 (SARS-COV-2) (CORONAVIRUS DISEASE [COVID-19]), AMPLIFIED PROBE TECHNIQUE, MAKING USE OF HIGH THROUGHPUT TECHNOLOGIES AS DESCRIBED BY CMS-2020-01-R: HCPCS | Performed by: FAMILY MEDICINE

## 2021-09-10 ENCOUNTER — TELEPHONE (OUTPATIENT)
Dept: FAMILY MEDICINE CLINIC | Facility: CLINIC | Age: 21
End: 2021-09-10

## 2021-09-10 NOTE — TELEPHONE ENCOUNTER
----- Message from [facility] sent at 9/10/2021 10:39 AM EDT -----  Please make sure patient is aware of negative COVID

## 2021-10-27 ENCOUNTER — OFFICE VISIT (OUTPATIENT)
Dept: FAMILY MEDICINE CLINIC | Facility: CLINIC | Age: 21
End: 2021-10-27
Payer: COMMERCIAL

## 2021-10-27 VITALS
SYSTOLIC BLOOD PRESSURE: 104 MMHG | WEIGHT: 150 LBS | BODY MASS INDEX: 27.6 KG/M2 | DIASTOLIC BLOOD PRESSURE: 72 MMHG | OXYGEN SATURATION: 100 % | HEART RATE: 81 BPM | RESPIRATION RATE: 16 BRPM | HEIGHT: 62 IN

## 2021-10-27 DIAGNOSIS — G43.109 MIGRAINE WITH AURA AND WITHOUT STATUS MIGRAINOSUS, NOT INTRACTABLE: Primary | ICD-10-CM

## 2021-10-27 DIAGNOSIS — R55 SYNCOPE, UNSPECIFIED SYNCOPE TYPE: ICD-10-CM

## 2021-10-27 DIAGNOSIS — E55.9 VITAMIN D INSUFFICIENCY: ICD-10-CM

## 2021-10-27 PROBLEM — B34.9 ACUTE VIRAL SYNDROME: Status: RESOLVED | Noted: 2020-08-26 | Resolved: 2021-10-27

## 2021-10-27 PROBLEM — N39.0 UTI (URINARY TRACT INFECTION): Status: RESOLVED | Noted: 2020-08-26 | Resolved: 2021-10-27

## 2021-10-27 PROCEDURE — 1036F TOBACCO NON-USER: CPT | Performed by: NURSE PRACTITIONER

## 2021-10-27 PROCEDURE — 99213 OFFICE O/P EST LOW 20 MIN: CPT | Performed by: NURSE PRACTITIONER

## 2021-10-27 PROCEDURE — 3008F BODY MASS INDEX DOCD: CPT | Performed by: NURSE PRACTITIONER

## 2021-10-27 RX ORDER — FLUCONAZOLE 150 MG/1
TABLET ORAL
COMMUNITY
Start: 2021-07-20 | End: 2021-10-27 | Stop reason: ALTCHOICE

## 2021-11-04 ENCOUNTER — OFFICE VISIT (OUTPATIENT)
Dept: LAB | Facility: CLINIC | Age: 21
End: 2021-11-04
Payer: COMMERCIAL

## 2021-11-04 ENCOUNTER — APPOINTMENT (OUTPATIENT)
Dept: LAB | Facility: CLINIC | Age: 21
End: 2021-11-04
Payer: COMMERCIAL

## 2021-11-04 DIAGNOSIS — E55.9 VITAMIN D INSUFFICIENCY: ICD-10-CM

## 2021-11-04 DIAGNOSIS — R55 SYNCOPE, UNSPECIFIED SYNCOPE TYPE: ICD-10-CM

## 2021-11-04 DIAGNOSIS — G43.109 MIGRAINE WITH AURA AND WITHOUT STATUS MIGRAINOSUS, NOT INTRACTABLE: ICD-10-CM

## 2021-11-04 LAB
25(OH)D3 SERPL-MCNC: 26.5 NG/ML (ref 30–100)
ALBUMIN SERPL BCP-MCNC: 3.9 G/DL (ref 3.5–5)
ALP SERPL-CCNC: 80 U/L (ref 46–116)
ALT SERPL W P-5'-P-CCNC: 22 U/L (ref 12–78)
ANION GAP SERPL CALCULATED.3IONS-SCNC: 7 MMOL/L (ref 4–13)
AST SERPL W P-5'-P-CCNC: 17 U/L (ref 5–45)
ATRIAL RATE: 67 BPM
BASOPHILS # BLD AUTO: 0.02 THOUSANDS/ΜL (ref 0–0.1)
BASOPHILS NFR BLD AUTO: 1 % (ref 0–1)
BILIRUB SERPL-MCNC: 0.32 MG/DL (ref 0.2–1)
BUN SERPL-MCNC: 8 MG/DL (ref 5–25)
CALCIUM SERPL-MCNC: 8.9 MG/DL (ref 8.3–10.1)
CHLORIDE SERPL-SCNC: 107 MMOL/L (ref 100–108)
CO2 SERPL-SCNC: 28 MMOL/L (ref 21–32)
CREAT SERPL-MCNC: 1.13 MG/DL (ref 0.6–1.3)
EOSINOPHIL # BLD AUTO: 0.1 THOUSAND/ΜL (ref 0–0.61)
EOSINOPHIL NFR BLD AUTO: 3 % (ref 0–6)
ERYTHROCYTE [DISTWIDTH] IN BLOOD BY AUTOMATED COUNT: 13.1 % (ref 11.6–15.1)
GFR SERPL CREATININE-BSD FRML MDRD: 80 ML/MIN/1.73SQ M
GLUCOSE P FAST SERPL-MCNC: 89 MG/DL (ref 65–99)
HCT VFR BLD AUTO: 41.6 % (ref 34.8–46.1)
HGB BLD-MCNC: 13 G/DL (ref 11.5–15.4)
IMM GRANULOCYTES # BLD AUTO: 0.01 THOUSAND/UL (ref 0–0.2)
IMM GRANULOCYTES NFR BLD AUTO: 0 % (ref 0–2)
LYMPHOCYTES # BLD AUTO: 2.25 THOUSANDS/ΜL (ref 0.6–4.47)
LYMPHOCYTES NFR BLD AUTO: 56 % (ref 14–44)
MCH RBC QN AUTO: 28.3 PG (ref 26.8–34.3)
MCHC RBC AUTO-ENTMCNC: 31.3 G/DL (ref 31.4–37.4)
MCV RBC AUTO: 91 FL (ref 82–98)
MONOCYTES # BLD AUTO: 0.35 THOUSAND/ΜL (ref 0.17–1.22)
MONOCYTES NFR BLD AUTO: 9 % (ref 4–12)
NEUTROPHILS # BLD AUTO: 1.2 THOUSANDS/ΜL (ref 1.85–7.62)
NEUTS SEG NFR BLD AUTO: 31 % (ref 43–75)
NRBC BLD AUTO-RTO: 0 /100 WBCS
P AXIS: 12 DEGREES
PLATELET # BLD AUTO: 206 THOUSANDS/UL (ref 149–390)
PMV BLD AUTO: 10.2 FL (ref 8.9–12.7)
POTASSIUM SERPL-SCNC: 4.2 MMOL/L (ref 3.5–5.3)
PR INTERVAL: 124 MS
PROT SERPL-MCNC: 7.6 G/DL (ref 6.4–8.2)
QRS AXIS: 80 DEGREES
QRSD INTERVAL: 76 MS
QT INTERVAL: 402 MS
QTC INTERVAL: 424 MS
RBC # BLD AUTO: 4.59 MILLION/UL (ref 3.81–5.12)
SODIUM SERPL-SCNC: 142 MMOL/L (ref 136–145)
T WAVE AXIS: 58 DEGREES
TSH SERPL DL<=0.05 MIU/L-ACNC: 0.62 UIU/ML (ref 0.36–3.74)
VENTRICULAR RATE: 67 BPM
WBC # BLD AUTO: 3.93 THOUSAND/UL (ref 4.31–10.16)

## 2021-11-04 PROCEDURE — 36415 COLL VENOUS BLD VENIPUNCTURE: CPT

## 2021-11-04 PROCEDURE — 93010 ELECTROCARDIOGRAM REPORT: CPT | Performed by: INTERNAL MEDICINE

## 2021-11-04 PROCEDURE — 93005 ELECTROCARDIOGRAM TRACING: CPT

## 2021-11-04 PROCEDURE — 85025 COMPLETE CBC W/AUTO DIFF WBC: CPT

## 2021-11-04 PROCEDURE — 80053 COMPREHEN METABOLIC PANEL: CPT

## 2021-11-04 PROCEDURE — 82306 VITAMIN D 25 HYDROXY: CPT

## 2021-11-04 PROCEDURE — 84443 ASSAY THYROID STIM HORMONE: CPT

## 2021-11-17 ENCOUNTER — OFFICE VISIT (OUTPATIENT)
Dept: FAMILY MEDICINE CLINIC | Facility: CLINIC | Age: 21
End: 2021-11-17
Payer: COMMERCIAL

## 2021-11-17 VITALS
OXYGEN SATURATION: 98 % | HEIGHT: 62 IN | WEIGHT: 150 LBS | HEART RATE: 83 BPM | SYSTOLIC BLOOD PRESSURE: 106 MMHG | DIASTOLIC BLOOD PRESSURE: 70 MMHG | RESPIRATION RATE: 16 BRPM | BODY MASS INDEX: 27.6 KG/M2

## 2021-11-17 DIAGNOSIS — F32.A DEPRESSION, UNSPECIFIED DEPRESSION TYPE: ICD-10-CM

## 2021-11-17 DIAGNOSIS — G43.109 MIGRAINE WITH AURA AND WITHOUT STATUS MIGRAINOSUS, NOT INTRACTABLE: Primary | ICD-10-CM

## 2021-11-17 DIAGNOSIS — R53.83 OTHER FATIGUE: ICD-10-CM

## 2021-11-17 DIAGNOSIS — E55.9 VITAMIN D INSUFFICIENCY: ICD-10-CM

## 2021-11-17 DIAGNOSIS — G43.109 MIGRAINE WITH AURA AND WITHOUT STATUS MIGRAINOSUS, NOT INTRACTABLE: ICD-10-CM

## 2021-11-17 DIAGNOSIS — R79.89 ABNORMAL CBC: ICD-10-CM

## 2021-11-17 PROCEDURE — 1036F TOBACCO NON-USER: CPT | Performed by: FAMILY MEDICINE

## 2021-11-17 PROCEDURE — 3725F SCREEN DEPRESSION PERFORMED: CPT | Performed by: FAMILY MEDICINE

## 2021-11-17 PROCEDURE — 3008F BODY MASS INDEX DOCD: CPT | Performed by: FAMILY MEDICINE

## 2021-11-17 PROCEDURE — 99214 OFFICE O/P EST MOD 30 MIN: CPT | Performed by: FAMILY MEDICINE

## 2021-11-17 RX ORDER — VENLAFAXINE HYDROCHLORIDE 37.5 MG/1
37.5 CAPSULE, EXTENDED RELEASE ORAL
Qty: 30 CAPSULE | Refills: 1 | Status: SHIPPED | OUTPATIENT
Start: 2021-11-17

## 2021-11-17 RX ORDER — SUMATRIPTAN 25 MG/1
25 TABLET, FILM COATED ORAL ONCE AS NEEDED
Qty: 30 TABLET | Refills: 0 | Status: SHIPPED | OUTPATIENT
Start: 2021-11-17

## 2021-11-17 RX ORDER — VENLAFAXINE HYDROCHLORIDE 150 MG/1
150 TABLET, EXTENDED RELEASE ORAL
Qty: 30 TABLET | Refills: 0 | Status: SHIPPED | OUTPATIENT
Start: 2021-11-17 | End: 2021-11-17

## 2021-11-22 DIAGNOSIS — J02.9 SORE THROAT: Primary | ICD-10-CM

## 2021-11-22 PROCEDURE — U0003 INFECTIOUS AGENT DETECTION BY NUCLEIC ACID (DNA OR RNA); SEVERE ACUTE RESPIRATORY SYNDROME CORONAVIRUS 2 (SARS-COV-2) (CORONAVIRUS DISEASE [COVID-19]), AMPLIFIED PROBE TECHNIQUE, MAKING USE OF HIGH THROUGHPUT TECHNOLOGIES AS DESCRIBED BY CMS-2020-01-R: HCPCS | Performed by: FAMILY MEDICINE

## 2021-11-22 PROCEDURE — U0005 INFEC AGEN DETEC AMPLI PROBE: HCPCS | Performed by: FAMILY MEDICINE

## 2021-11-23 ENCOUNTER — TELEPHONE (OUTPATIENT)
Dept: FAMILY MEDICINE CLINIC | Facility: CLINIC | Age: 21
End: 2021-11-23

## 2021-12-09 ENCOUNTER — TELEMEDICINE (OUTPATIENT)
Dept: FAMILY MEDICINE CLINIC | Facility: CLINIC | Age: 21
End: 2021-12-09
Payer: COMMERCIAL

## 2021-12-09 VITALS — WEIGHT: 150 LBS | BODY MASS INDEX: 27.6 KG/M2 | HEIGHT: 62 IN

## 2021-12-09 DIAGNOSIS — R05.9 COUGH: Primary | ICD-10-CM

## 2021-12-09 DIAGNOSIS — H93.8X3 EAR FULLNESS, BILATERAL: ICD-10-CM

## 2021-12-09 DIAGNOSIS — J01.00 ACUTE NON-RECURRENT MAXILLARY SINUSITIS: ICD-10-CM

## 2021-12-09 DIAGNOSIS — R09.81 NASAL CONGESTION: ICD-10-CM

## 2021-12-09 PROCEDURE — 99213 OFFICE O/P EST LOW 20 MIN: CPT | Performed by: FAMILY MEDICINE

## 2021-12-09 RX ORDER — GUAIFENESIN 600 MG
1200 TABLET, EXTENDED RELEASE 12 HR ORAL EVERY 12 HOURS SCHEDULED
Qty: 60 TABLET | Refills: 0 | Status: SHIPPED | OUTPATIENT
Start: 2021-12-09

## 2021-12-09 RX ORDER — AMOXICILLIN AND CLAVULANATE POTASSIUM 875; 125 MG/1; MG/1
1 TABLET, FILM COATED ORAL EVERY 12 HOURS SCHEDULED
Qty: 20 TABLET | Refills: 0 | Status: SHIPPED | OUTPATIENT
Start: 2021-12-09 | End: 2021-12-19

## 2021-12-22 ENCOUNTER — TELEMEDICINE (OUTPATIENT)
Dept: FAMILY MEDICINE CLINIC | Facility: CLINIC | Age: 21
End: 2021-12-22
Payer: COMMERCIAL

## 2021-12-22 VITALS — BODY MASS INDEX: 26.68 KG/M2 | HEIGHT: 62 IN | WEIGHT: 145 LBS

## 2021-12-22 DIAGNOSIS — B34.9 VIRAL INFECTION, UNSPECIFIED: Primary | ICD-10-CM

## 2021-12-22 PROCEDURE — 87636 SARSCOV2 & INF A&B AMP PRB: CPT | Performed by: NURSE PRACTITIONER

## 2021-12-22 PROCEDURE — 3725F SCREEN DEPRESSION PERFORMED: CPT | Performed by: NURSE PRACTITIONER

## 2021-12-22 PROCEDURE — 3008F BODY MASS INDEX DOCD: CPT | Performed by: NURSE PRACTITIONER

## 2021-12-22 PROCEDURE — 99213 OFFICE O/P EST LOW 20 MIN: CPT | Performed by: NURSE PRACTITIONER

## 2021-12-22 PROCEDURE — 1036F TOBACCO NON-USER: CPT | Performed by: NURSE PRACTITIONER

## 2022-06-02 ENCOUNTER — OFFICE VISIT (OUTPATIENT)
Dept: URGENT CARE | Age: 22
End: 2022-06-02
Payer: COMMERCIAL

## 2022-06-02 VITALS
HEIGHT: 62 IN | DIASTOLIC BLOOD PRESSURE: 69 MMHG | BODY MASS INDEX: 25.76 KG/M2 | HEART RATE: 79 BPM | TEMPERATURE: 97.2 F | RESPIRATION RATE: 20 BRPM | WEIGHT: 140 LBS | SYSTOLIC BLOOD PRESSURE: 119 MMHG | OXYGEN SATURATION: 99 %

## 2022-06-02 DIAGNOSIS — R30.0 DYSURIA: ICD-10-CM

## 2022-06-02 DIAGNOSIS — R35.0 URINARY FREQUENCY: Primary | ICD-10-CM

## 2022-06-02 LAB
SL AMB  POCT GLUCOSE, UA: NORMAL
SL AMB LEUKOCYTE ESTERASE,UA: NORMAL
SL AMB POCT BILIRUBIN,UA: NORMAL
SL AMB POCT BLOOD,UA: NORMAL
SL AMB POCT CLARITY,UA: CLEAR
SL AMB POCT COLOR,UA: YELLOW
SL AMB POCT KETONES,UA: NORMAL
SL AMB POCT NITRITE,UA: NORMAL
SL AMB POCT PH,UA: 6.5
SL AMB POCT SPECIFIC GRAVITY,UA: 1
SL AMB POCT URINE PROTEIN: NORMAL
SL AMB POCT UROBILINOGEN: 0.2

## 2022-06-02 PROCEDURE — 81002 URINALYSIS NONAUTO W/O SCOPE: CPT

## 2022-06-02 PROCEDURE — 99213 OFFICE O/P EST LOW 20 MIN: CPT

## 2022-06-02 PROCEDURE — 87086 URINE CULTURE/COLONY COUNT: CPT

## 2022-06-03 NOTE — PATIENT INSTRUCTIONS
Urine dip was positive for trace amount of blood  We are sending her urine out for culture  If it is positive for infection, we will call you to initiate antibiotic therapy

## 2022-06-03 NOTE — PROGRESS NOTES
[facility]        NAME: Tri Peng is a 20 y o  female  : 2000    MRN: [id number]  DATE: 2022  TIME: 8:02 PM    Assessment and Plan   Urinary frequency [R35 0]  1  Urinary frequency  POCT urine dip     Urine dip positive for trace blood  In results of urine culture  Antibiotic therapy and not initiated at this time  Patient Instructions     Azo as needed for symptomatic relief  Follow up with PCP in 3-5 days  Proceed to  ER if symptoms worsen  Chief Complaint     Chief Complaint   Patient presents with    Urinary Frequency     URINARY FREQUENCY AND BURNING FOR 3 DAYS  History of Present Illness       Patient presenting for evaluation of dysuria and urinary frequency over the past 3 days  She denies any suprapubic or flank pain, fevers or chills  Patient denies any visual blood in her urine  Patient denies any current treatment for her symptoms  Patient states that she has a history of UTIs, but has not had 1 in over a year  Review of Systems   Review of Systems   Constitutional: Negative for chills and fever  HENT: Negative for ear pain and sore throat  Eyes: Negative for pain and visual disturbance  Respiratory: Negative for cough and shortness of breath  Cardiovascular: Negative for chest pain and palpitations  Gastrointestinal: Negative for abdominal pain and vomiting  Genitourinary: Positive for dysuria and frequency  Negative for decreased urine volume, difficulty urinating, dyspareunia, enuresis, flank pain, genital sores, hematuria, menstrual problem, pelvic pain, urgency, vaginal bleeding, vaginal discharge and vaginal pain  Musculoskeletal: Negative for arthralgias and back pain  Skin: Negative for color change and rash  Neurological: Negative for seizures and syncope  All other systems reviewed and are negative          Current Medications       Current Outpatient Medications:     guaiFENesin (MUCINEX) 600 mg 12 hr tablet, Take 2 tablets (1,200 mg total) by mouth every 12 (twelve) hours (Patient not taking: Reported on 6/2/2022), Disp: 60 tablet, Rfl: 0    SUMAtriptan (Imitrex) 25 mg tablet, Take 1 tablet (25 mg total) by mouth once as needed for migraine for up to 30 doses (Patient not taking: Reported on 6/2/2022), Disp: 30 tablet, Rfl: 0    venlafaxine (EFFEXOR-XR) 37 5 mg 24 hr capsule, Take 1 capsule (37 5 mg total) by mouth daily with breakfast (Patient not taking: Reported on 6/2/2022), Disp: 30 capsule, Rfl: 1    Current Allergies     Allergies as of 06/02/2022    (No Known Allergies)            The following portions of the patient's history were reviewed and updated as appropriate: allergies, current medications, past family history, past medical history, past social history, past surgical history and problem list      Past Medical History:   Diagnosis Date    Anxiety     Depression        History reviewed  No pertinent surgical history  Family History   Problem Relation Age of Onset    No Known Problems Mother     No Known Problems Father     Diabetes Paternal Grandfather     Cancer Paternal Grandfather     Breast cancer Family 48        MGM's sister     Migraines Maternal Grandmother     Cervical cancer Neg Hx     Colon cancer Neg Hx     Ovarian cancer Neg Hx     Uterine cancer Neg Hx     Sudden death Neg Hx          Medications have been verified  Objective   /69   Pulse 79   Temp (!) 97 2 °F (36 2 °C) (Temporal)   Resp 20   Ht 5' 2" (1 575 m)   Wt 63 5 kg (140 lb)   LMP 05/01/2022 (Exact Date)   SpO2 99%   BMI 25 61 kg/m²        Physical Exam     Physical Exam  Vitals and nursing note reviewed  Constitutional:       General: She is not in acute distress  Appearance: Normal appearance  She is not ill-appearing, toxic-appearing or diaphoretic  HENT:      Head: Normocephalic and atraumatic        Right Ear: Tympanic membrane normal       Left Ear: Tympanic membrane normal  Nose: Nose normal  No congestion or rhinorrhea  Mouth/Throat:      Mouth: Mucous membranes are moist       Pharynx: Oropharynx is clear  No oropharyngeal exudate or posterior oropharyngeal erythema  Eyes:      Extraocular Movements: Extraocular movements intact  Conjunctiva/sclera: Conjunctivae normal       Pupils: Pupils are equal, round, and reactive to light  Cardiovascular:      Rate and Rhythm: Normal rate and regular rhythm  Pulses: Normal pulses  Heart sounds: Normal heart sounds  No murmur heard  No friction rub  No gallop  Pulmonary:      Effort: Pulmonary effort is normal  No respiratory distress  Breath sounds: Normal breath sounds  No stridor  No wheezing, rhonchi or rales  Chest:      Chest wall: No tenderness  Abdominal:      General: Abdomen is flat  Bowel sounds are normal       Palpations: Abdomen is soft  Tenderness: There is abdominal tenderness in the suprapubic area  There is no right CVA tenderness, left CVA tenderness, guarding or rebound  Musculoskeletal:         General: No tenderness  Normal range of motion  Cervical back: Normal range of motion and neck supple  Skin:     General: Skin is warm and dry  Capillary Refill: Capillary refill takes less than 2 seconds  Neurological:      General: No focal deficit present  Mental Status: She is alert and oriented to person, place, and time     Psychiatric:         Mood and Affect: Mood normal          Behavior: Behavior normal

## 2022-06-04 LAB — BACTERIA UR CULT: NORMAL

## 2022-07-13 ENCOUNTER — HOSPITAL ENCOUNTER (EMERGENCY)
Facility: HOSPITAL | Age: 22
Discharge: HOME | End: 2022-07-14
Attending: EMERGENCY MEDICINE | Admitting: EMERGENCY MEDICINE
Payer: COMMERCIAL

## 2022-07-13 ENCOUNTER — APPOINTMENT (EMERGENCY)
Dept: RADIOLOGY | Facility: HOSPITAL | Age: 22
End: 2022-07-13
Payer: COMMERCIAL

## 2022-07-13 DIAGNOSIS — U07.1 COVID-19: Primary | ICD-10-CM

## 2022-07-13 PROCEDURE — 96374 THER/PROPH/DIAG INJ IV PUSH: CPT

## 2022-07-13 PROCEDURE — 71045 X-RAY EXAM CHEST 1 VIEW: CPT

## 2022-07-13 PROCEDURE — 99283 EMERGENCY DEPT VISIT LOW MDM: CPT

## 2022-07-13 PROCEDURE — 96361 HYDRATE IV INFUSION ADD-ON: CPT

## 2022-07-13 RX ORDER — NAPROXEN 500 MG/1
500 TABLET ORAL 2 TIMES DAILY WITH MEALS
Qty: 30 TABLET | Refills: 0 | Status: SHIPPED | OUTPATIENT
Start: 2022-07-13

## 2022-07-13 RX ORDER — ONDANSETRON 4 MG/1
4 TABLET, ORALLY DISINTEGRATING ORAL ONCE
Status: COMPLETED | OUTPATIENT
Start: 2022-07-13 | End: 2022-07-13

## 2022-07-13 RX ORDER — ACETAMINOPHEN 325 MG/1
650 TABLET ORAL ONCE
Status: COMPLETED | OUTPATIENT
Start: 2022-07-13 | End: 2022-07-13

## 2022-07-13 RX ORDER — KETOROLAC TROMETHAMINE 30 MG/ML
15 INJECTION, SOLUTION INTRAMUSCULAR; INTRAVENOUS ONCE
Status: COMPLETED | OUTPATIENT
Start: 2022-07-13 | End: 2022-07-13

## 2022-07-13 RX ORDER — ONDANSETRON 4 MG/1
4 TABLET, ORALLY DISINTEGRATING ORAL EVERY 6 HOURS PRN
Qty: 20 TABLET | Refills: 0 | Status: SHIPPED | OUTPATIENT
Start: 2022-07-13

## 2022-07-13 RX ADMIN — ACETAMINOPHEN 650 MG: 325 TABLET, FILM COATED ORAL at 23:00

## 2022-07-13 RX ADMIN — ONDANSETRON 4 MG: 4 TABLET, ORALLY DISINTEGRATING ORAL at 22:59

## 2022-07-13 RX ADMIN — SODIUM CHLORIDE 500 ML: 0.9 INJECTION, SOLUTION INTRAVENOUS at 22:07

## 2022-07-13 RX ADMIN — KETOROLAC TROMETHAMINE 15 MG: 30 INJECTION, SOLUTION INTRAMUSCULAR at 22:08

## 2022-07-14 VITALS
HEART RATE: 80 BPM | RESPIRATION RATE: 20 BRPM | SYSTOLIC BLOOD PRESSURE: 130 MMHG | TEMPERATURE: 99.6 F | OXYGEN SATURATION: 100 % | DIASTOLIC BLOOD PRESSURE: 82 MMHG

## 2022-07-14 PROCEDURE — 99284 EMERGENCY DEPT VISIT MOD MDM: CPT

## 2022-07-14 NOTE — ED PROVIDER NOTES
History  Chief Complaint   Patient presents with    Cold Like Symptoms     Tested positive this morning for covid, patient reports short of breath, body aches, fatigue, sore throat, softer bowel movements     The patient is a 25-year-old female with no significant past medical history who presents to the ED for evaluation of shortness of breath, myalgias, diarrhea, and sore throat which began today  She reports that she tested positive for COVID using at home test   She last took Tylenol several hours ago which only minimally improved her symptoms  She otherwise denies chest pain, syncope, leg swelling, hemoptysis, abdominal pain, urinary symptoms  Prior to Admission Medications   Prescriptions Last Dose Informant Patient Reported? Taking? SUMAtriptan (Imitrex) 25 mg tablet   No No   Sig: Take 1 tablet (25 mg total) by mouth once as needed for migraine for up to 30 doses   Patient not taking: Reported on 6/2/2022   guaiFENesin (MUCINEX) 600 mg 12 hr tablet   No No   Sig: Take 2 tablets (1,200 mg total) by mouth every 12 (twelve) hours   Patient not taking: Reported on 6/2/2022   venlafaxine (EFFEXOR-XR) 37 5 mg 24 hr capsule   No No   Sig: Take 1 capsule (37 5 mg total) by mouth daily with breakfast   Patient not taking: Reported on 6/2/2022      Facility-Administered Medications: None       Past Medical History:   Diagnosis Date    Anxiety     Depression        History reviewed  No pertinent surgical history  Family History   Problem Relation Age of Onset    No Known Problems Mother     No Known Problems Father     Diabetes Paternal Grandfather     Cancer Paternal Grandfather     Breast cancer Family 48        MGM's sister     Migraines Maternal Grandmother     Cervical cancer Neg Hx     Colon cancer Neg Hx     Ovarian cancer Neg Hx     Uterine cancer Neg Hx     Sudden death Neg Hx      I have reviewed and agree with the history as documented      E-Cigarette/Vaping    E-Cigarette Use Never User      E-Cigarette/Vaping Substances     Social History     Tobacco Use    Smoking status: Never Smoker    Smokeless tobacco: Never Used   Vaping Use    Vaping Use: Never used   Substance Use Topics    Alcohol use: Yes    Drug use: Never       Review of Systems   Constitutional: Positive for chills and fever (tactile)  HENT: Positive for congestion  Negative for rhinorrhea  Respiratory: Positive for cough and shortness of breath  Cardiovascular: Negative for chest pain and leg swelling  Gastrointestinal: Positive for diarrhea  Negative for abdominal pain, blood in stool, constipation, nausea and vomiting  Genitourinary: Negative for dysuria and flank pain  Musculoskeletal: Positive for myalgias  Negative for arthralgias  Skin: Negative for rash and wound  Neurological: Negative for dizziness, weakness and numbness  Psychiatric/Behavioral: Negative for behavioral problems  Physical Exam  Physical Exam  Vitals and nursing note reviewed  Constitutional:       General: She is not in acute distress  Appearance: She is well-developed  She is not toxic-appearing  HENT:      Head: Normocephalic and atraumatic  Nose: Congestion present  Mouth/Throat:      Mouth: Mucous membranes are moist       Pharynx: Posterior oropharyngeal erythema present  Comments: Tolerating secretions without dysphagia, normal phonation   Eyes:      Conjunctiva/sclera: Conjunctivae normal    Cardiovascular:      Rate and Rhythm: Normal rate and regular rhythm  Heart sounds: No murmur heard  Pulmonary:      Effort: Pulmonary effort is normal  No respiratory distress  Breath sounds: Normal breath sounds  No stridor  No wheezing, rhonchi or rales  Abdominal:      Palpations: Abdomen is soft  Tenderness: There is no abdominal tenderness  There is no guarding or rebound  Musculoskeletal:         General: No tenderness (no calf tenderness)  Cervical back: Neck supple  No rigidity  Right lower leg: No edema  Left lower leg: No edema  Skin:     General: Skin is warm and dry  Neurological:      Mental Status: She is alert           Vital Signs  ED Triage Vitals [07/13/22 2105]   Temperature Pulse Respirations Blood Pressure SpO2   99 6 °F (37 6 °C) (!) 111 20 130/82 99 %      Temp Source Heart Rate Source Patient Position - Orthostatic VS BP Location FiO2 (%)   Oral Monitor Sitting Left arm --      Pain Score       --           Vitals:    07/13/22 2105 07/14/22 0005   BP: 130/82    Pulse: (!) 111 80   Patient Position - Orthostatic VS: Sitting          Visual Acuity      ED Medications  Medications   sodium chloride 0 9 % bolus 500 mL (0 mL Intravenous Stopped 7/13/22 2258)   ketorolac (TORADOL) injection 15 mg (15 mg Intravenous Given 7/13/22 2208)   acetaminophen (TYLENOL) tablet 650 mg (650 mg Oral Given 7/13/22 2300)   ondansetron (ZOFRAN-ODT) dispersible tablet 4 mg (4 mg Oral Given 7/13/22 2259)       Diagnostic Studies  Results Reviewed     None                 XR chest 1 view portable   ED Interpretation by Stalin Suggs PA-C (07/13 2234)   No evidence of infiltrate, pneumothorax, or acute cardiopulmonary disease as interpreted by me                 Procedures  Procedures         ED Course         MDM  Number of Diagnoses or Management Options  COVID-19: new and requires workup     Amount and/or Complexity of Data Reviewed  Tests in the radiology section of CPT®: ordered and reviewed  Tests in the medicine section of CPT®: ordered and reviewed  Decide to obtain previous medical records or to obtain history from someone other than the patient: yes  Review and summarize past medical records: yes  Independent visualization of images, tracings, or specimens: yes    Risk of Complications, Morbidity, and/or Mortality  Presenting problems: low  Management options: low    Patient Progress  Patient progress: improved     The patient is a 23-year-old female with no significant past medical history who presents to the ED for evaluation of shortness of breath, myalgias, diarrhea, and sore throat which began today after testing positive for COVID-19  On exam, the patient is well-appearing in no acute distress  No dyspnea, tachypnea, cyanosis, or any other respiratory distress  Patient is well hydrated with moist mucous membranes  Vital signs stable  No neck stiffness  Abdomen is soft and nontender  Normal phonation  No dysphagia on exam     The patient has a history and physical exam consistent with a viral illness  I considered the patient's other medical conditions as applicable/noted above in my medical decision making  CXR obtained; findings reviewed, CXR without abnormality  Patient treated symptomatically in ED with some improvement  At the time of discharge, the patient is in no acute distress  I discussed with the patient the diagnosis, treatment plan, follow-up, return precautions, and discharge instructions; they were given the opportunity to ask questions and verbalized understanding  Disposition  Final diagnoses:   [id number]     Time reflects when diagnosis was documented in both MDM as applicable and the Disposition within this note     Time User Action Codes Description Comment    7/13/2022 11:39 PM Jeane Quintanilla [U07 1] COVID-19       ED Disposition     ED Disposition   Discharge    Condition   Stable    Date/Time   Wed Jul 13, 2022 11:39 PM    Comment   Lizeth Birch discharge to home/self care                 Follow-up Information     Follow up With Specialties Details Why [address], [address] [place] [address]   [id number] [place] [address] Drive,[address]  [address]  [phone]            Discharge Medication List as of 7/13/2022 11:41 PM      START taking these medications    Details   naproxen (Naprosyn) 500 mg tablet Take 1 tablet (500 mg total) by mouth 2 (two) times a day with meals, Starting Wed 7/13/2022, Normal      ondansetron (Zofran ODT) 4 mg disintegrating tablet Take 1 tablet (4 mg total) by mouth every 6 (six) hours as needed for nausea or vomiting, Starting Wed 7/13/2022, Normal         CONTINUE these medications which have NOT CHANGED    Details   guaiFENesin (MUCINEX) 600 mg 12 hr tablet Take 2 tablets (1,200 mg total) by mouth every 12 (twelve) hours, Starting Thu 12/9/2021, Normal      SUMAtriptan (Imitrex) 25 mg tablet Take 1 tablet (25 mg total) by mouth once as needed for migraine for up to 30 doses, Starting Wed 11/17/2021, Normal      venlafaxine (EFFEXOR-XR) 37 5 mg 24 hr capsule Take 1 capsule (37 5 mg total) by mouth daily with breakfast, Starting Wed 11/17/2021, Normal             No discharge procedures on file      PDMP Review     None          ED Provider  Electronically Signed by           Ced Marshall PA-C  07/14/22 2328

## 2022-07-14 NOTE — DISCHARGE INSTRUCTIONS
Take zofran as needed for nausea, as prescribed    Take Naprosyn as needed for pain  Do not take on an empty stomach   Do not combine with Motrin, Ibuprofen, or Advil (it is the same class of medication)     Return for chest pain, trouble breathing, leg swelling, coughing up blood, inability to keep fluids down, blood in vomit/stool, passing out, neck stiffness, vision changes, or any other new/concerning symptoms     Follow up with your PCP

## 2022-07-27 ENCOUNTER — HOSPITAL ENCOUNTER (EMERGENCY)
Facility: HOSPITAL | Age: 22
Discharge: HOME | End: 2022-07-27
Attending: EMERGENCY MEDICINE | Admitting: EMERGENCY MEDICINE
Payer: COMMERCIAL

## 2022-07-27 VITALS
RESPIRATION RATE: 18 BRPM | OXYGEN SATURATION: 100 % | HEART RATE: 72 BPM | TEMPERATURE: 98.4 F | SYSTOLIC BLOOD PRESSURE: 99 MMHG | DIASTOLIC BLOOD PRESSURE: 67 MMHG

## 2022-07-27 DIAGNOSIS — T74.21XA SEXUAL ASSAULT OF ADULT, INITIAL ENCOUNTER: Primary | ICD-10-CM

## 2022-07-27 PROCEDURE — 36415 COLL VENOUS BLD VENIPUNCTURE: CPT | Performed by: EMERGENCY MEDICINE

## 2022-07-27 PROCEDURE — 86706 HEP B SURFACE ANTIBODY: CPT | Performed by: EMERGENCY MEDICINE

## 2022-07-27 PROCEDURE — 87340 HEPATITIS B SURFACE AG IA: CPT | Performed by: EMERGENCY MEDICINE

## 2022-07-27 PROCEDURE — 87389 HIV-1 AG W/HIV-1&-2 AB AG IA: CPT | Performed by: EMERGENCY MEDICINE

## 2022-07-27 PROCEDURE — 99284 EMERGENCY DEPT VISIT MOD MDM: CPT

## 2022-07-27 PROCEDURE — 86803 HEPATITIS C AB TEST: CPT | Performed by: EMERGENCY MEDICINE

## 2022-07-27 PROCEDURE — 99284 EMERGENCY DEPT VISIT MOD MDM: CPT | Performed by: EMERGENCY MEDICINE

## 2022-07-27 PROCEDURE — 86592 SYPHILIS TEST NON-TREP QUAL: CPT | Performed by: EMERGENCY MEDICINE

## 2022-07-28 LAB
HBV SURFACE AB SER-ACNC: <3.1 MIU/ML
HBV SURFACE AG SER QL: NORMAL
HCV AB SER QL: NORMAL
RPR SER QL: NORMAL
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
## 2022-07-29 LAB — HIV 1+2 AB+HIV1 P24 AG SERPL QL IA: NORMAL

## 2022-10-10 ENCOUNTER — OFFICE VISIT (OUTPATIENT)
Dept: FAMILY MEDICINE CLINIC | Facility: CLINIC | Age: 22
End: 2022-10-10
Payer: COMMERCIAL

## 2022-10-10 VITALS — TEMPERATURE: 98.7 F | OXYGEN SATURATION: 96 % | HEART RATE: 113 BPM | RESPIRATION RATE: 16 BRPM

## 2022-10-10 DIAGNOSIS — J01.00 ACUTE NON-RECURRENT MAXILLARY SINUSITIS: Primary | ICD-10-CM

## 2022-10-10 PROCEDURE — 99213 OFFICE O/P EST LOW 20 MIN: CPT | Performed by: FAMILY MEDICINE

## 2022-10-10 RX ORDER — AMOXICILLIN AND CLAVULANATE POTASSIUM 875; 125 MG/1; MG/1
1 TABLET, FILM COATED ORAL EVERY 12 HOURS SCHEDULED
Qty: 14 TABLET | Refills: 0 | Status: SHIPPED | OUTPATIENT
Start: 2022-10-10 | End: 2022-10-17

## 2022-10-10 NOTE — ASSESSMENT & PLAN NOTE
Discussed about the Flonase, she says she does not like the steroid, then she can use the saline nasal rinse and use the antibiotic as her symptoms are getting worse

## 2022-10-10 NOTE — PROGRESS NOTES
Name: Gloria Velazquez      : 2000      MRN: [id number]  Encounter Provider: Sean Tijerina MD  Encounter Date: 10/10/2022   Encounter department: [address]     1  Acute non-recurrent maxillary sinusitis  Assessment & Plan:  Discussed about the Flonase, she says she does not like the steroid, then she can use the saline nasal rinse and use the antibiotic as her symptoms are getting worse    Orders:  -     amoxicillin-clavulanate (Augmentin) 875-125 mg per tablet; Take 1 tablet by mouth every 12 (twelve) hours for 7 days        Depression Screening and Follow-up Plan: Patient was screened for depression during today's encounter  They screened negative with a PHQ-2 score of 0  Subjective     Sinusitis  This is a new problem  The current episode started in the past 7 days (5)  The problem has been gradually worsening since onset  There has been no fever  The pain is mild  Pertinent negatives include no chills, congestion, coughing, ear pain, headaches, neck pain, shortness of breath, sinus pressure, sneezing, sore throat or swollen glands  Past treatments include acetaminophen  The treatment provided no relief    since this morning green nasal discharge from nose   Home covid test negative x2 , vaccinated for covid  Review of Systems   Constitutional: Positive for fatigue  Negative for activity change, appetite change, chills, fever and unexpected weight change  HENT: Negative for congestion, ear discharge, ear pain, nosebleeds, postnasal drip, rhinorrhea, sinus pressure, sneezing, sore throat, trouble swallowing and voice change  Eyes: Negative for photophobia, pain, discharge, redness and itching  Respiratory: Negative for cough, chest tightness, shortness of breath and wheezing  Cardiovascular: Negative for chest pain, palpitations and leg swelling  Gastrointestinal: Negative for abdominal pain, constipation, diarrhea, nausea and vomiting  Endocrine: Negative for polyuria  Genitourinary: Negative for dysuria, frequency and urgency  Musculoskeletal: Negative for arthralgias, back pain, myalgias and neck pain  Skin: Negative for color change, pallor and rash  Allergic/Immunologic: Negative for environmental allergies and food allergies  Neurological: Negative for dizziness, weakness, light-headedness and headaches  Hematological: Negative for adenopathy  Does not bruise/bleed easily  Psychiatric/Behavioral: Negative for behavioral problems  The patient is not nervous/anxious  Past Medical History:   Diagnosis Date   • Anxiety    • Depression      History reviewed  No pertinent surgical history  Family History   Problem Relation Age of Onset   • No Known Problems Mother    • No Known Problems Father    • Diabetes Paternal Grandfather    • Cancer Paternal Grandfather    • Breast cancer Family 48        MGM's sister    • Migraines Maternal Grandmother    • Cervical cancer Neg Hx    • Colon cancer Neg Hx    • Ovarian cancer Neg Hx    • Uterine cancer Neg Hx    • Sudden death Neg Hx      Social History     Socioeconomic History   • Marital status: Single     Spouse name: None   • Number of children: None   • Years of education: None   • Highest education level: None   Occupational History   • None   Tobacco Use   • Smoking status: Never Smoker   • Smokeless tobacco: Never Used   Vaping Use   • Vaping Use: Never used   Substance and Sexual Activity   • Alcohol use:  Yes   • Drug use: Never   • Sexual activity: Yes     Partners: Male     Comment: declined STD screening    Other Topics Concern   • None   Social History Narrative    · Do you currently or have you served in the [employer]:   No      · Were you activated, into active duty, as a member of the [employer] or as a Reservist:   No      Social Determinants of Health     Financial Resource Strain: Not on file   Food Insecurity: Not on file   Transportation Needs: Not on file   Physical Activity: Not on file   Stress: Not on file   Social Connections: Not on file   Intimate Partner Violence: Not on file   Housing Stability: Not on file     Current Outpatient Medications on File Prior to Visit   Medication Sig   • guaiFENesin (MUCINEX) 600 mg 12 hr tablet Take 2 tablets (1,200 mg total) by mouth every 12 (twelve) hours   • naproxen (Naprosyn) 500 mg tablet Take 1 tablet (500 mg total) by mouth 2 (two) times a day with meals   • ondansetron (Zofran ODT) 4 mg disintegrating tablet Take 1 tablet (4 mg total) by mouth every 6 (six) hours as needed for nausea or vomiting   • SUMAtriptan (Imitrex) 25 mg tablet Take 1 tablet (25 mg total) by mouth once as needed for migraine for up to 30 doses   • venlafaxine (EFFEXOR-XR) 37 5 mg 24 hr capsule Take 1 capsule (37 5 mg total) by mouth daily with breakfast     No Known Allergies  Immunization History   Administered Date(s) Administered   • COVID-19 PFIZER VACCINE 0 3 ML IM 07/28/2021, 08/18/2021   • DTaP 09/26/2020       Objective     Pulse (!) 113   Temp 98 7 °F (37 1 °C)   Resp 16   SpO2 96%     Physical Exam  Vitals and nursing note reviewed  Constitutional:       Appearance: She is well-developed  HENT:      Head: Normocephalic and atraumatic  Right Ear: External ear normal       Left Ear: External ear normal    Eyes:      General: No scleral icterus  Conjunctiva/sclera: Conjunctivae normal       Pupils: Pupils are equal, round, and reactive to light  Neck:      Thyroid: No thyromegaly  Cardiovascular:      Rate and Rhythm: Normal rate and regular rhythm  Heart sounds: Normal heart sounds  No murmur heard  Pulmonary:      Effort: Pulmonary effort is normal       Breath sounds: Normal breath sounds  No wheezing or rales  Musculoskeletal:      Cervical back: Normal range of motion and neck supple  Lymphadenopathy:      Cervical: No cervical adenopathy  Skin:     Findings: No erythema or rash     Neurological: Mental Status: She is alert         Tarah Bee MD

## 2022-10-19 ENCOUNTER — OFFICE VISIT (OUTPATIENT)
Dept: NEUROLOGY | Facility: CLINIC | Age: 22
End: 2022-10-19
Payer: COMMERCIAL

## 2022-10-19 VITALS
WEIGHT: 150 LBS | HEIGHT: 62 IN | HEART RATE: 88 BPM | DIASTOLIC BLOOD PRESSURE: 60 MMHG | BODY MASS INDEX: 27.6 KG/M2 | SYSTOLIC BLOOD PRESSURE: 110 MMHG

## 2022-10-19 DIAGNOSIS — G43.109 MIGRAINE WITH AURA AND WITHOUT STATUS MIGRAINOSUS, NOT INTRACTABLE: ICD-10-CM

## 2022-10-19 PROCEDURE — 99205 OFFICE O/P NEW HI 60 MIN: CPT | Performed by: STUDENT IN AN ORGANIZED HEALTH CARE EDUCATION/TRAINING PROGRAM

## 2022-10-19 RX ORDER — RIZATRIPTAN BENZOATE 10 MG/1
10 TABLET ORAL AS NEEDED
Qty: 9 TABLET | Refills: 3 | Status: SHIPPED | OUTPATIENT
Start: 2022-10-19

## 2022-10-19 RX ORDER — TOPIRAMATE 25 MG/1
25 TABLET ORAL DAILY
Qty: 90 TABLET | Refills: 3 | Status: SHIPPED | OUTPATIENT
Start: 2022-10-19

## 2022-10-19 NOTE — PATIENT INSTRUCTIONS
Patient instructions      Additional Testing:   MRI brain w/wo     Headache/migraine treatment:   Abortive medications (for immediate treatment of a headache): It is ok to take ibuprofen, acetaminophen or naproxen (Advil, Tylenol,  Aleve, Excedrin) if they help your headaches you should limit these to No more than 3 times a week to avoid medication overuse/rebound headaches  For your more moderate to severe migraines take this medication early  Maxalt (rizatriptan) 10mg tabs - take one at the onset of headache  May repeat one time after 1-2 hours if pain has not resolved  (Max 2 a day and 9 a month)     - some people may have some side effects from this medication, most typically do not  Common side effects include making you feel tired, palpitations, tingling or tightness of the face or chest   Most people report the side effects are nothing compared to their migraines and do not mind these  If you have intolerable side effects we will stop  Over the counter preventive supplements for headaches/migraines (if you try, try for 3 months straight)  (to take every day to help prevent headaches - not to take at the time of headache): There are combo pills online of these - none of which regulated by FDA and double check dosing - take appropriate dose only once a day- preventa migraine, migravent, mind ease, migrelief   [] Magnesium 400mg daily (If any diarrhea or upset stomach, decrease dose  as tolerated)  [] Riboflavin (Vitamin B2) 400mg daily - try online   (FYI B2 may make your urine bright/neon yellow)  AND/OR  [] Herbal medication: Petasites/Butterbur 150 mg daily - try online  (When choosing your Butterbur online or in the store, beware that there are some poor preps containing pyrrolizidine alkaloids (PAs) that can be harmful to the liver   Therefore, do not use butterbur products that are not labeled as PA-free )    Sleep and headache prevention:   [] Melatonin - you may take 3 mg nightly for sleep  You should take this 1 hour prior to bedtime consistently every night for it to work  It works by gradually helping to adjust your sleep time over days to weeks, rather than immediately making you feel sleepy  Prescription preventive medications for headaches/migraines   (to take every day to help prevent headaches - not to take at the time of headache):  [x] Topamax start 25 mg at bedtime  Can increase further as needed until migraine frequency improved per schedule below    Week 1 - 25 mg at night  Week 2 - 25 mg in the morning, 25 mg at night  Week 3 - 25 mg in the morning, 50 mg at night  Week 4 - 50 mg in the morning, 50 mg at night    *Typically these types of medications take time untill you see the benefit, although some may see improvement in days, often it may take weeks, especially if the medication is being titrated up to a beneficial level  Please contact us if there are any concerns or questions regarding the medication  Lifestyle Recommendations:  [x] SLEEP - Maintain a regular sleep schedule: Adults need at least 7-8 hours of uninterrupted a night  Maintain good sleep hygiene:  Going to bed and waking up at consistent times, avoiding excessive daytime naps, avoiding caffeinated beverages in the evening, avoid excessive stimulation in the evening and generally using bed primarily for sleeping  One hour before bedtime would recommend turning lights down lower, decreasing your activity (may read quietly, listen to music at a low volume)  When you get into bed, should eliminate all technology (no texting, emailing, playing with your phone, iPad or tablet in bed)  [x] HYDRATION - Maintain good hydration  Drink  2L of fluid a day (4 typical small water bottles)  [x] DIET - Maintain good nutrition  In particular don't skip meals and try and eat healthy balanced meals regularly  [x] TRIGGERS - Look for other triggers and avoid them: Limit caffeine to 1-2 cups a day or less   Avoid dietary triggers that you have noticed bring on your headaches (this could include aged cheese, peanuts, MSG, aspartame and nitrates)  [x] EXERCISE - physical exercise as we all know is good for you in many ways, and not only is good for your heart, but also is beneficial for your mental health, cognitive health and  chronic pain/headaches  I would encourage at the least 5 days of physical exercise weekly for at least 30 minutes  Education and Follow-up  [x] Please call with any questions or concerns  Of course if any new concerning symptoms go to the emergency department    [x] Follow up in 4-5 months

## 2022-10-19 NOTE — PROGRESS NOTES
[facility]  PATIENT:  Areli Charles  MRN:  [id number]  :  2000  DATE OF SERVICE:  10/19/2022  REFERRED BY: Brian Connell, *  PMD: Bashir Ford DO    Assessment/Plan:     Areli Charles is a very pleasant 20 y o  female with a past medical history that includes depression and anxiety who presents for evaluation of headaches  Migraines without aura  Workup:  - due to increased frequency and severity of headaches and migraines I recommend further evaluation with MRI brain with without contrast to rule out structural or treatable causes of symptoms     Preventative:  - we discussed headache hygiene and lifestyle factors that may improve headaches, including proper hydration, sleep hygiene and not skipping meals  - she endorses poor sleep  Advised to try melatonin nightly   - previously tried amitriptyline - not able to tolerate due to excessive drowsiness  Prescribed venlafaxine but pt did not want to take because she is uncomfortable taking antidepressants  - start topamax 25 mg qHS  Provided instructions on how to increase if no change in migraine frequency up to 50 mg BID  - discussed alternatives including CGRP inhibitors and botox today if topamax ineffective    Abortive:  - discussed not taking over-the-counter or prescription pain medications more than 3 days per week to prevent medication overuse/rebound headache  - previously tried imitrex - made her too sedated and did not seem to help headaches  - start maxalt 10 mg PRN at onset of migraines  - if side effects, will consider nurtec or ubrelvy    F/u in 4-5 months    CC:   headaches    History of Present Illness:     20 y o  female with a past medical history that includes depression and anxiety who presents for evaluation of headaches  Headaches started a few years ago  She noticed that the frequency of her migraines was much worse when she was still taking birth control   Currently, she is having headaches about once a week; however, each headache can last about 1-2 days  If she is going to have one, she tends to become photophobic several hours beforehand during the day which will then progress to a migraine by the evening/night  Headaches started at what age? 22  How often do the headaches occur?   - as of 10/19/2022: What time of the day do the headaches start? No particular time of day   How long do the headaches last? 1-2 days  Are you ever headache free? Yes    Aura? without aura    Where is your headache located and pain quality? Left sided behind eye and over head  Stabbing, pressure like burning pain  What is the intensity of pain? Average: 8/10  Associated symptoms:   [x] Nausea       [] Vomiting        [] Diarrhea  [] Stiff or sore neck   [] Problems with concentration  [x] Photophobia     [x]Phonophobia      [] Osmophobia  [] Blurred vision   [x] Prefer quiet, dark room  [] Light-headed or dizzy     [] Tinnitus   [] Hands or feet tingle or feel numb/paresthesias      [] Ptosis      [] Facial droop  [] Lacrimation  [] Nasal congestion/rhinorrhea      Things that make the headache worse? No specific movements  Headache triggers:  Stress, sunlight, menstruation, weather changes    Have you seen someone else for headaches or pain? No  Have you had trigger point injection performed and how often? No  Have you had Botox injection performed and how often? No   Have you had epidural injections or transforaminal injections performed? No  Are you current pregnant or planning on getting pregnant? no  Have you ever had any Brain imaging? yes 2017 - [facility] after falling and passing out at work    What medications do you take or have you taken for your headaches?    ABORTIVE:    imitrex  Naproxen  zofran    PREVENTIVE:   Prescribed venlafaxine previously but never took - did not feel comfortable taking antidepressant  Amitriptyline - too drowsy, not able to tolerate    LIFESTYLE  Sleep   - averages: 6-7 hrs at night  Problems falling asleep?:   Yes   Problems staying asleep?:  Yes  Has not tried melatonin    Water: 4-5 bottles daily  Caffeine: a few cups of coffee a week    Mood:  Endorses depression/anxiety    The following portions of the patient's history were reviewed and updated as appropriate: allergies, current medications, past family history, past medical history, past social history, past surgical history and problem list     Pertinent family history:  Family history of headaches:  migraine headaches in father and grandmother  Any family history of aneurysms - No      Past Medical History:     Past Medical History:   Diagnosis Date   • Anxiety    • Depression        Patient Active Problem List   Diagnosis   • Migraine with aura and without status migrainosus, not intractable   • Vitamin D deficiency   • Syncope   • Viral infection, unspecified   • Acute non-recurrent maxillary sinusitis       Medications:      Current Outpatient Medications   Medication Sig Dispense Refill   • guaiFENesin (MUCINEX) 600 mg 12 hr tablet Take 2 tablets (1,200 mg total) by mouth every 12 (twelve) hours (Patient not taking: Reported on 10/19/2022) 60 tablet 0   • naproxen (Naprosyn) 500 mg tablet Take 1 tablet (500 mg total) by mouth 2 (two) times a day with meals (Patient not taking: Reported on 10/19/2022) 30 tablet 0   • ondansetron (Zofran ODT) 4 mg disintegrating tablet Take 1 tablet (4 mg total) by mouth every 6 (six) hours as needed for nausea or vomiting (Patient not taking: Reported on 10/19/2022) 20 tablet 0   • SUMAtriptan (Imitrex) 25 mg tablet Take 1 tablet (25 mg total) by mouth once as needed for migraine for up to 30 doses (Patient not taking: Reported on 10/19/2022) 30 tablet 0   • venlafaxine (EFFEXOR-XR) 37 5 mg 24 hr capsule Take 1 capsule (37 5 mg total) by mouth daily with breakfast (Patient not taking: Reported on 10/19/2022) 30 capsule 1     No current facility-administered medications for this visit  Allergies:    No Known Allergies    Family History:     Family History   Problem Relation Age of Onset   • No Known Problems Mother    • No Known Problems Father    • Diabetes Paternal Grandfather    • Cancer Paternal Grandfather    • Breast cancer Family 48        MGM's sister    • Migraines Maternal Grandmother    • Cervical cancer Neg Hx    • Colon cancer Neg Hx    • Ovarian cancer Neg Hx    • Uterine cancer Neg Hx    • Sudden death Neg Hx        Social History:       Social History     Socioeconomic History   • Marital status: Single     Spouse name: Not on file   • Number of children: Not on file   • Years of education: Not on file   • Highest education level: Not on file   Occupational History   • Not on file   Tobacco Use   • Smoking status: Never Smoker   • Smokeless tobacco: Never Used   Vaping Use   • Vaping Use: Never used   Substance and Sexual Activity   • Alcohol use: Yes   • Drug use: Never   • Sexual activity: Yes     Partners: Male     Comment: declined STD screening    Other Topics Concern   • Not on file   Social History Narrative    · Do you currently or have you served in the [employer]:   No      · Were you activated, into active duty, as a member of the [employer] or as a Reservist:   No      Social Determinants of Health     Financial Resource Strain: Not on file   Food Insecurity: Not on file   Transportation Needs: Not on file   Physical Activity: Not on file   Stress: Not on file   Social Connections: Not on file   Intimate Partner Violence: Not on file   Housing Stability: Not on file         Objective:   /60 (BP Location: Left arm, Patient Position: Sitting, Cuff Size: Adult)   Pulse 88   Ht 5' 2" (1 575 m)   Wt 68 kg (150 lb)   BMI 27 44 kg/m²     General: Patient is not in any acute/apparent distress, well nourished, well developed and cooperative     HEENT: normocephalic, atraumatic, moist membranes  Neck: supple  Extremities: no edema noted   Skin: no lesions or rash  Musculosketal: no bony abnormalities    Neurologic Examination:   Mental status: alert, awake, oriented X 3 and following commands  Speech/Language: Speech is fluent without any dysarthria, no aphasia noted,  comprehension intact    Cranial Nerves:   CN I: smell not tested  CN II: Visual fields full to confrontation, fundus - no papilledema noted  CN III, IV, VI: Extraocular movements intact bilaterally  Pupils equal round and reactive to light bilaterally  CN V: Facial sensation is normal   CN VII: Full and symmetric facial movement  CN VIII: Hearing is normal   CN IX, X: Palate elevates symmetrically  CN XI: Shoulder shrug strength is normal   CN XII: Tongue midline without atrophy or fasciculations  Motor:   Strength 5/5 in all 4 extremities  No pronator drift  Normal rapid alternating movements  Bulk/tone - normal   Fasiculations - none    Sensory:   Sensation intact to soft touch in all 4 extremities  Cerebellar:   Finger-to-nose intact, normal heel to shin  Reflexes: 1+ in all 4 extremities  Pathologic reflexes - babinski reflex negative    Gait:   Normal gait, Romberg sign negative       Review of Systems:     ROS:  Review of Systems   Constitutional: Negative  Negative for appetite change and fever  HENT: Negative  Negative for hearing loss, tinnitus, trouble swallowing and voice change  Eyes: Positive for visual disturbance  Negative for photophobia and pain  Respiratory: Negative  Negative for shortness of breath  Cardiovascular: Negative  Negative for palpitations  Gastrointestinal: Positive for nausea  Negative for vomiting  Endocrine: Negative  Negative for cold intolerance  Genitourinary: Negative  Negative for dysuria, frequency and urgency  Musculoskeletal: Negative  Negative for gait problem, myalgias and neck pain  Skin: Negative  Negative for rash  Allergic/Immunologic: Negative  Neurological: Positive for headaches   Negative for dizziness, tremors, seizures, syncope, facial asymmetry, speech difficulty, weakness, light-headedness and numbness  Patient stated that  she has  weekly headaches  Hematological: Negative  Does not bruise/bleed easily  Psychiatric/Behavioral: Negative  Negative for confusion, hallucinations and sleep disturbance  I have spent 40 minutes with Patient today in which greater than 50% of this time was spent in counseling/coordination of care regarding Risks and benefits of tx options, Intructions for management, Importance of tx compliance, Risk factor reductions and Impressions  I also spent 20 minutes non face to face for this patient the same day

## 2022-12-09 PROBLEM — J01.00 ACUTE NON-RECURRENT MAXILLARY SINUSITIS: Status: RESOLVED | Noted: 2022-10-10 | Resolved: 2022-12-09

## 2022-12-18 ENCOUNTER — HOSPITAL ENCOUNTER (EMERGENCY)
Facility: HOSPITAL | Age: 22
Discharge: HOME | End: 2022-12-18
Attending: EMERGENCY MEDICINE

## 2022-12-18 ENCOUNTER — APPOINTMENT (EMERGENCY)
Dept: CT IMAGING | Facility: HOSPITAL | Age: 22
End: 2022-12-18

## 2022-12-18 VITALS
RESPIRATION RATE: 20 BRPM | DIASTOLIC BLOOD PRESSURE: 68 MMHG | HEART RATE: 88 BPM | TEMPERATURE: 98 F | OXYGEN SATURATION: 100 % | SYSTOLIC BLOOD PRESSURE: 97 MMHG

## 2022-12-18 DIAGNOSIS — R10.9 ABDOMINAL PAIN: Primary | ICD-10-CM

## 2022-12-18 LAB
ALBUMIN SERPL BCP-MCNC: 4.2 G/DL (ref 3.5–5)
ALP SERPL-CCNC: 55 U/L (ref 34–104)
ALT SERPL W P-5'-P-CCNC: 90 U/L (ref 7–52)
ANION GAP SERPL CALCULATED.3IONS-SCNC: 8 MMOL/L (ref 4–13)
AST SERPL W P-5'-P-CCNC: 231 U/L (ref 13–39)
BACTERIA UR QL AUTO: ABNORMAL /HPF
BASOPHILS # BLD AUTO: 0.03 THOUSANDS/ÂΜL (ref 0–0.1)
BASOPHILS NFR BLD AUTO: 0 % (ref 0–1)
BILIRUB SERPL-MCNC: 0.38 MG/DL (ref 0.2–1)
BILIRUB UR QL STRIP: NEGATIVE
BUN SERPL-MCNC: 11 MG/DL (ref 5–25)
CALCIUM SERPL-MCNC: 9.3 MG/DL (ref 8.4–10.2)
CHLORIDE SERPL-SCNC: 103 MMOL/L (ref 96–108)
CLARITY UR: CLEAR
CO2 SERPL-SCNC: 25 MMOL/L (ref 21–32)
COLOR UR: YELLOW
CREAT SERPL-MCNC: 0.86 MG/DL (ref 0.6–1.3)
EOSINOPHIL # BLD AUTO: 0.21 THOUSAND/ÂΜL (ref 0–0.61)
EOSINOPHIL NFR BLD AUTO: 3 % (ref 0–6)
ERYTHROCYTE [DISTWIDTH] IN BLOOD BY AUTOMATED COUNT: 12 % (ref 11.6–15.1)
EXT PREGNANCY TEST URINE: NEGATIVE
EXT. CONTROL: NORMAL
GFR SERPL CREATININE-BSD FRML MDRD: 95 ML/MIN/1.73SQ M
GLUCOSE SERPL-MCNC: 109 MG/DL (ref 65–140)
GLUCOSE UR STRIP-MCNC: NEGATIVE MG/DL
HCT VFR BLD AUTO: 37.5 % (ref 34.8–46.1)
HGB BLD-MCNC: 12.3 G/DL (ref 11.5–15.4)
HGB UR QL STRIP.AUTO: NEGATIVE
HYALINE CASTS #/AREA URNS LPF: ABNORMAL /LPF
IMM GRANULOCYTES # BLD AUTO: 0.01 THOUSAND/UL (ref 0–0.2)
IMM GRANULOCYTES NFR BLD AUTO: 0 % (ref 0–2)
KETONES UR STRIP-MCNC: ABNORMAL MG/DL
LEUKOCYTE ESTERASE UR QL STRIP: NEGATIVE
LIPASE SERPL-CCNC: 17 U/L (ref 11–82)
LYMPHOCYTES # BLD AUTO: 2.95 THOUSANDS/ÂΜL (ref 0.6–4.47)
LYMPHOCYTES NFR BLD AUTO: 43 % (ref 14–44)
MCH RBC QN AUTO: 28.7 PG (ref 26.8–34.3)
MCHC RBC AUTO-ENTMCNC: 32.8 G/DL (ref 31.4–37.4)
MCV RBC AUTO: 88 FL (ref 82–98)
MONOCYTES # BLD AUTO: 0.56 THOUSAND/ÂΜL (ref 0.17–1.22)
MONOCYTES NFR BLD AUTO: 8 % (ref 4–12)
MUCOUS THREADS UR QL AUTO: ABNORMAL
NEUTROPHILS # BLD AUTO: 3.14 THOUSANDS/ÂΜL (ref 1.85–7.62)
NEUTS SEG NFR BLD AUTO: 46 % (ref 43–75)
NITRITE UR QL STRIP: NEGATIVE
NON-SQ EPI CELLS URNS QL MICRO: ABNORMAL /HPF
NRBC BLD AUTO-RTO: 0 /100 WBCS
PH UR STRIP.AUTO: 6.5 [PH]
PLATELET # BLD AUTO: 191 THOUSANDS/UL (ref 149–390)
PMV BLD AUTO: 10.3 FL (ref 8.9–12.7)
POTASSIUM SERPL-SCNC: 3.5 MMOL/L (ref 3.5–5.3)
PROT SERPL-MCNC: 7.1 G/DL (ref 6.4–8.4)
PROT UR STRIP-MCNC: ABNORMAL MG/DL
RBC # BLD AUTO: 4.28 MILLION/UL (ref 3.81–5.12)
RBC #/AREA URNS AUTO: ABNORMAL /HPF
SODIUM SERPL-SCNC: 136 MMOL/L (ref 135–147)
SP GR UR STRIP.AUTO: 1.02 (ref 1–1.03)
UROBILINOGEN UR QL STRIP.AUTO: 0.2 E.U./DL
WBC # BLD AUTO: 6.9 THOUSAND/UL (ref 4.31–10.16)
WBC #/AREA URNS AUTO: ABNORMAL /HPF

## 2022-12-18 RX ORDER — ONDANSETRON 2 MG/ML
4 INJECTION INTRAMUSCULAR; INTRAVENOUS ONCE
Status: COMPLETED | OUTPATIENT
Start: 2022-12-18 | End: 2022-12-18

## 2022-12-18 RX ORDER — KETOROLAC TROMETHAMINE 30 MG/ML
15 INJECTION, SOLUTION INTRAMUSCULAR; INTRAVENOUS ONCE
Status: COMPLETED | OUTPATIENT
Start: 2022-12-18 | End: 2022-12-18

## 2022-12-18 RX ADMIN — MORPHINE SULFATE 2 MG: 2 INJECTION, SOLUTION INTRAMUSCULAR; INTRAVENOUS at 05:03

## 2022-12-18 RX ADMIN — IOHEXOL 100 ML: 350 INJECTION, SOLUTION INTRAVENOUS at 05:00

## 2022-12-18 RX ADMIN — KETOROLAC TROMETHAMINE 15 MG: 30 INJECTION, SOLUTION INTRAMUSCULAR; INTRAVENOUS at 04:27

## 2022-12-18 RX ADMIN — ONDANSETRON 4 MG: 2 INJECTION INTRAMUSCULAR; INTRAVENOUS at 04:26

## 2022-12-18 NOTE — ED NOTES
Discharge reviewed with patient  Patient verbalized understanding and has no further questions at this time  Patient ambulatory off unit with steady gait        [address] [facility], [address] [place] [address]  12/18/22 [id number]

## 2022-12-18 NOTE — ED PROVIDER NOTES
History  Chief Complaint   Patient presents with   • Abdominal Pain     Pt presents to the ED with c/o lower quadrant abdominal pain and nausea that started about 1 hour ago     This is a 18-year-old female who presents to the emergency department complaining of abdominal pain  Patient pain began approximately 1 hour prior to arrival in the emergency department  She states the pain is located in her lower abdomen  It does not radiate  Nothing makes it better or worse  The patient states the pain is sharp and severe  The patient denies fevers or chills  She has had nausea but no vomiting  She denies urinary symptoms  She denies changes in her bowel habits  She had a bowel movement this morning that was unremarkable  She denies a rash  She denies chest pain or trouble breathing  She denies vaginal discharge or irritation  Prior to Admission Medications   Prescriptions Last Dose Informant Patient Reported? Taking? guaiFENesin (MUCINEX) 600 mg 12 hr tablet   No No   Sig: Take 2 tablets (1,200 mg total) by mouth every 12 (twelve) hours   Patient not taking: Reported on 10/19/2022   naproxen (Naprosyn) 500 mg tablet   No No   Sig: Take 1 tablet (500 mg total) by mouth 2 (two) times a day with meals   Patient not taking: Reported on 10/19/2022   ondansetron (Zofran ODT) 4 mg disintegrating tablet   No No   Sig: Take 1 tablet (4 mg total) by mouth every 6 (six) hours as needed for nausea or vomiting   Patient not taking: Reported on 10/19/2022   rizatriptan (Maxalt) 10 mg tablet   No No   Sig: Take 1 tablet (10 mg total) by mouth as needed for migraine Take at the onset of migraine; if symptoms continue or return, may take another dose at least 2 hours after first dose  Take no more than 2 doses in a day     topiramate (Topamax) 25 mg tablet   No No   Sig: Take 1 tablet (25 mg total) by mouth daily   venlafaxine (EFFEXOR-XR) 37 5 mg 24 hr capsule   No No   Sig: Take 1 capsule (37 5 mg total) by mouth daily with breakfast   Patient not taking: Reported on 10/19/2022      Facility-Administered Medications: None       Past Medical History:   Diagnosis Date   • Anxiety    • Depression        History reviewed  No pertinent surgical history  Family History   Problem Relation Age of Onset   • No Known Problems Mother    • No Known Problems Father    • Diabetes Paternal Grandfather    • Cancer Paternal Grandfather    • Breast cancer Family 48        MGM's sister    • Migraines Maternal Grandmother    • Cervical cancer Neg Hx    • Colon cancer Neg Hx    • Ovarian cancer Neg Hx    • Uterine cancer Neg Hx    • Sudden death Neg Hx      I have reviewed and agree with the history as documented  E-Cigarette/Vaping   • E-Cigarette Use Never User      E-Cigarette/Vaping Substances     Social History     Tobacco Use   • Smoking status: Never   • Smokeless tobacco: Never   Vaping Use   • Vaping Use: Never used   Substance Use Topics   • Alcohol use: Yes   • Drug use: Yes     Types: Marijuana       Review of Systems   All other systems reviewed and are negative        Physical Exam  Physical Exam  Constitutional:  Vital signs reviewed, patient appears nontoxic, no acute distress  Eyes: Pupils equal round reactive to light and accommodation, extraocular muscles intact  HEENT: trachea midline, no JVD, moist mucous membranes  Respiratory: lung sounds clear throughout, no rhonchi, no rales  Cardiovascular: regular rate rhythm, no murmurs or rubs  Abdomen: soft, bilateral lower abdominal tenderness, suprapubic tenderness, nondistended, no rebound or guarding  Back: no CVA tenderness, normal inspection  Extremities: no edema, pulses equal in all 4 extremities  Neuro: awake, alert, oriented, no focal weakness  Skin: warm, dry, intact, no rashes noted    Vital Signs  ED Triage Vitals [12/18/22 0352]   Temperature Pulse Respirations Blood Pressure SpO2   97 6 °F (36 4 °C) 75 20 99/65 100 %      Temp Source Heart Rate Source Patient Position - Orthostatic VS BP Location FiO2 (%)   Oral Monitor Sitting Left arm --      Pain Score       10 - Worst Possible Pain           Vitals:    12/18/22 0352 12/18/22 0507   BP: 99/65 97/68   Pulse: 75 88   Patient Position - Orthostatic VS: Sitting Lying         Visual Acuity      ED Medications  Medications   ondansetron (ZOFRAN) injection 4 mg (4 mg Intravenous Given 12/18/22 0426)   ketorolac (TORADOL) injection 15 mg (15 mg Intravenous Given 12/18/22 0427)   iohexol (OMNIPAQUE) 350 MG/ML injection (SINGLE-DOSE) 100 mL (100 mL Intravenous Given 12/18/22 0500)   morphine injection 2 mg (2 mg Intravenous Given 12/18/22 0503)       Diagnostic Studies  Results Reviewed     Procedure Component Value Units Date/Time    Urine Microscopic [[id number]]  (Abnormal) Collected: 12/18/22 0421    Lab Status: Final result Specimen: Urine, Other Updated: 12/18/22 0449     RBC, UA None Seen /hpf      WBC, UA None Seen /hpf      Epithelial Cells Occasional /hpf      Bacteria, UA None Seen /hpf      Hyaline Casts, UA 4-10 /lpf      MUCUS THREADS Moderate    CMP [[id number]]  (Abnormal) Collected: 12/18/22 0415    Lab Status: Final result Specimen: Blood from Arm, Right Updated: 12/18/22 0440     Sodium 136 mmol/L      Potassium 3 5 mmol/L      Chloride 103 mmol/L      CO2 25 mmol/L      ANION GAP 8 mmol/L      BUN 11 mg/dL      Creatinine 0 86 mg/dL      Glucose 109 mg/dL      Calcium 9 3 mg/dL       U/L      ALT 90 U/L      Alkaline Phosphatase 55 U/L      Total Protein 7 1 g/dL      Albumin 4 2 g/dL      Total Bilirubin 0 38 mg/dL      eGFR 95 ml/min/1 73sq m     Narrative:      [place] guidelines for Chronic Kidney Disease (CKD):   •  Stage 1 with normal or high GFR (GFR > 90 mL/min/1 73 square meters)  •  Stage 2 Mild CKD (GFR = 60-89 mL/min/1 73 square meters)  •  Stage 3A Moderate CKD (GFR = 45-59 mL/min/1 73 square meters)  •  Stage 3B Moderate CKD (GFR = 30-44 mL/min/1 73 square meters)  •  Stage 4 Severe CKD (GFR = 15-29 mL/min/1 73 square meters)  •  Stage 5 End Stage CKD (GFR <15 mL/min/1 73 square meters)  Note: GFR calculation is accurate only with a steady state creatinine    Lipase [[id number]]  (Normal) Collected: 12/18/22 0415    Lab Status: Final result Specimen: Blood from Arm, Right Updated: 12/18/22 0440     Lipase 17 u/L     UA (URINE) with reflex to Scope [[id number]]  (Abnormal) Collected: 12/18/22 0421    Lab Status: Final result Specimen: Urine, Other Updated: 12/18/22 0429     Color, UA Yellow     Clarity, UA Clear     Specific Gravity, UA 1 025     pH, UA 6 5     Leukocytes, UA Negative     Nitrite, UA Negative     Protein, UA 1+ mg/dl      Glucose, UA Negative mg/dl      Ketones, UA Trace mg/dl      Urobilinogen, UA 0 2 E U /dl      Bilirubin, UA Negative     Occult Blood, UA Negative    POCT pregnancy, urine [[id number]]  (Normal) Resulted: 12/18/22 0426    Lab Status: Final result Specimen: Urine Updated: 12/18/22 0426     EXT Preg Test, Ur Negative     Control Valid    CBC and differential [[id number]] Collected: 12/18/22 0415    Lab Status: Final result Specimen: Blood from Arm, Right Updated: 12/18/22 0421     WBC 6 90 Thousand/uL      RBC 4 28 Million/uL      Hemoglobin 12 3 g/dL      Hematocrit 37 5 %      MCV 88 fL      MCH 28 7 pg      MCHC 32 8 g/dL      RDW 12 0 %      MPV 10 3 fL      Platelets 004 Thousands/uL      nRBC 0 /100 WBCs      Neutrophils Relative 46 %      Immat GRANS % 0 %      Lymphocytes Relative 43 %      Monocytes Relative 8 %      Eosinophils Relative 3 %      Basophils Relative 0 %      Neutrophils Absolute 3 14 Thousands/µL      Immature Grans Absolute 0 01 Thousand/uL      Lymphocytes Absolute 2 95 Thousands/µL      Monocytes Absolute 0 56 Thousand/µL      Eosinophils Absolute 0 21 Thousand/µL      Basophils Absolute 0 03 Thousands/µL                  CT Abdomen pelvis with contrast   Final Result by Bandar Buckner DO (12/18 6945)      No acute inflammatory process identified  There is a small volume of free pelvic fluid, likely physiologic given the patient's age and gender  Workstation performed: [id number]                    Procedures  Procedures         ED Course  ED Course as of 12/18/22 0537   Shonda Chairez Dec 18, 2022   1543 The patient had lab work though significant for white count of 6 9, a lipase of 17, and a UA that was unremarkable  The patient had a CAT scan that showed no acute abnormality of the abdomen or pelvis  She had significant pain relief after pain medication  The patient's was discharged with follow-up to her primary care physician                               SBIRT 20yo+    [address] Most Recent Value   SBIRT (25 yo +)    In order to provide better care to our patients, we are screening all of our patients for alcohol and drug use  Would it be okay to ask you these screening questions? Unable to answer at this time Filed at: 12/18/2022 9298                    [facility]  Number of Diagnoses or Management Options  Diagnosis management comments: This is a 54-year-old female presents to the emergency department complaining of bilateral lower abdominal pain  I considered appendicitis, colitis, ovarian torsion  These and other diagnoses were considered  Amount and/or Complexity of Data Reviewed  Clinical lab tests: reviewed and ordered  Tests in the radiology section of CPT®: ordered and reviewed        Disposition  Final diagnoses:   Abdominal pain     Time reflects when diagnosis was documented in both MDM as applicable and the Disposition within this note     Time User Action Codes Description Comment    12/18/2022  5:16 AM Juan Nunez Add [R10 9] Abdominal pain       ED Disposition     ED Disposition   Discharge    Condition   Stable    Date/Time   Sun Dec 18, 2022  5:16 AM    Comment   Lizeth Birch discharge to home/self care                 Follow-up Information     Follow up With Specialties Details Why Contact Info Additional Information    [facility]  Family Medicine In 2 days  [address] [facility]  [phone]       [facility] [address] Emergency Department Emergency Medicine  If symptoms worsen [address] [address]  [facility] Emergency Department, [address], [address]          Discharge Medication List as of 12/18/2022  5:17 AM      CONTINUE these medications which have NOT CHANGED    Details   guaiFENesin (MUCINEX) 600 mg 12 hr tablet Take 2 tablets (1,200 mg total) by mouth every 12 (twelve) hours, Starting Thu 12/9/2021, Normal      naproxen (Naprosyn) 500 mg tablet Take 1 tablet (500 mg total) by mouth 2 (two) times a day with meals, Starting Wed 7/13/2022, Normal      ondansetron (Zofran ODT) 4 mg disintegrating tablet Take 1 tablet (4 mg total) by mouth every 6 (six) hours as needed for nausea or vomiting, Starting Wed 7/13/2022, Normal      rizatriptan (Maxalt) 10 mg tablet Take 1 tablet (10 mg total) by mouth as needed for migraine Take at the onset of migraine; if symptoms continue or return, may take another dose at least 2 hours after first dose  Take no more than 2 doses in a day , Starting Wed 10/19/2022, Normal      topiramate (Topamax) 25 mg tablet Take 1 tablet (25 mg total) by mouth daily, Starting Wed 10/19/2022, Normal      venlafaxine (EFFEXOR-XR) 37 5 mg 24 hr capsule Take 1 capsule (37 5 mg total) by mouth daily with breakfast, Starting Wed 11/17/2021, Normal             No discharge procedures on file      PDMP Review     None          ED Provider  Electronically Signed by           Tiera Truong DO  12/18/22 9862

## 2023-05-01 ENCOUNTER — APPOINTMENT (EMERGENCY)
Dept: ULTRASOUND IMAGING | Facility: HOSPITAL | Age: 23
End: 2023-05-01

## 2023-05-01 ENCOUNTER — HOSPITAL ENCOUNTER (EMERGENCY)
Facility: HOSPITAL | Age: 23
Discharge: HOME | End: 2023-05-01
Attending: EMERGENCY MEDICINE

## 2023-05-01 VITALS
WEIGHT: 145 LBS | RESPIRATION RATE: 18 BRPM | BODY MASS INDEX: 26.52 KG/M2 | TEMPERATURE: 98.3 F | SYSTOLIC BLOOD PRESSURE: 122 MMHG | OXYGEN SATURATION: 99 % | HEART RATE: 72 BPM | DIASTOLIC BLOOD PRESSURE: 76 MMHG

## 2023-05-01 DIAGNOSIS — O46.90 VAGINAL BLEEDING DURING PREGNANCY: Primary | ICD-10-CM

## 2023-05-01 LAB
ALBUMIN SERPL BCP-MCNC: 4.2 G/DL (ref 3.5–5)
ALP SERPL-CCNC: 53 U/L (ref 34–104)
ALT SERPL W P-5'-P-CCNC: 12 U/L (ref 7–52)
ANION GAP SERPL CALCULATED.3IONS-SCNC: 7 MMOL/L (ref 4–13)
AST SERPL W P-5'-P-CCNC: 16 U/L (ref 13–39)
B-HCG SERPL-ACNC: 835 MIU/ML (ref 0–11.6)
BASOPHILS # BLD AUTO: 0.05 THOUSANDS/ΜL (ref 0–0.1)
BASOPHILS NFR BLD AUTO: 1 % (ref 0–1)
BILIRUB SERPL-MCNC: 0.55 MG/DL (ref 0.2–1)
BUN SERPL-MCNC: 9 MG/DL (ref 5–25)
CALCIUM SERPL-MCNC: 9 MG/DL (ref 8.4–10.2)
CHLORIDE SERPL-SCNC: 105 MMOL/L (ref 96–108)
CO2 SERPL-SCNC: 24 MMOL/L (ref 21–32)
CREAT SERPL-MCNC: 0.69 MG/DL (ref 0.6–1.3)
EOSINOPHIL # BLD AUTO: 0.11 THOUSAND/ΜL (ref 0–0.61)
EOSINOPHIL NFR BLD AUTO: 2 % (ref 0–6)
ERYTHROCYTE [DISTWIDTH] IN BLOOD BY AUTOMATED COUNT: 12.1 % (ref 11.6–15.1)
EXT PREGNANCY TEST URINE: POSITIVE
EXT. CONTROL: ABNORMAL
GFR SERPL CREATININE-BSD FRML MDRD: 123 ML/MIN/1.73SQ M
GLUCOSE SERPL-MCNC: 94 MG/DL (ref 65–140)
HCT VFR BLD AUTO: 35.9 % (ref 34.8–46.1)
HGB BLD-MCNC: 11.6 G/DL (ref 11.5–15.4)
IMM GRANULOCYTES # BLD AUTO: 0.03 THOUSAND/UL (ref 0–0.2)
IMM GRANULOCYTES NFR BLD AUTO: 0 % (ref 0–2)
LIPASE SERPL-CCNC: 13 U/L (ref 11–82)
LYMPHOCYTES # BLD AUTO: 2.82 THOUSANDS/ΜL (ref 0.6–4.47)
LYMPHOCYTES NFR BLD AUTO: 41 % (ref 14–44)
MCH RBC QN AUTO: 28.7 PG (ref 26.8–34.3)
MCHC RBC AUTO-ENTMCNC: 32.3 G/DL (ref 31.4–37.4)
MCV RBC AUTO: 89 FL (ref 82–98)
MONOCYTES # BLD AUTO: 0.55 THOUSAND/ΜL (ref 0.17–1.22)
MONOCYTES NFR BLD AUTO: 8 % (ref 4–12)
NEUTROPHILS # BLD AUTO: 3.39 THOUSANDS/ΜL (ref 1.85–7.62)
NEUTS SEG NFR BLD AUTO: 48 % (ref 43–75)
NRBC BLD AUTO-RTO: 0 /100 WBCS
PLATELET # BLD AUTO: 234 THOUSANDS/UL (ref 149–390)
PMV BLD AUTO: 10.2 FL (ref 8.9–12.7)
POTASSIUM SERPL-SCNC: 3.6 MMOL/L (ref 3.5–5.3)
PROT SERPL-MCNC: 7.3 G/DL (ref 6.4–8.4)
RBC # BLD AUTO: 4.04 MILLION/UL (ref 3.81–5.12)
SODIUM SERPL-SCNC: 136 MMOL/L (ref 135–147)
WBC # BLD AUTO: 6.95 THOUSAND/UL (ref 4.31–10.16)

## 2023-05-01 NOTE — Clinical Note
Lizeth Birch was seen and treated in our emergency department on 5/1/2023  Diagnosis:     Lizeth  may return to school on return date  She may return on this date: 05/02/2023         If you have any questions or concerns, please don't hesitate to call        Trevon Weller RN    ______________________________           _______________          _______________  Hospital Representative                              Date                                Time

## 2023-05-01 NOTE — ED ATTENDING ATTESTATION
5/1/2023  I, May Santos MD, saw and evaluated the patient  I have discussed the patient with the resident/non-physician practitioner and agree with the resident's/non-physician practitioner's findings, Plan of Care, and MDM as documented in the resident's/non-physician practitioner's note, except where noted  All available labs and Radiology studies were reviewed  I was present for key portions of any procedure(s) performed by the resident/non-physician practitioner and I was immediately available to provide assistance  At this point I agree with the current assessment done in the Emergency Department  I have conducted an independent evaluation of this patient a history and physical is as follows: This is a 48-year-old G1, P0 currently 8 weeks gestation per LMP female without any significantly related past medical history presenting to the ED today for complaint of vaginal bleeding  Patient states that she has had spotting for the past 2 to 3 days, but today she has started passing clots  She denies any pelvic pain  She denies any nausea vomiting diarrhea or constipation dysuria frequency hesitancy, vaginal discharge or any other significantly related symptoms  Her exam for the most part is unremarkable  She is nontender in her pelvis  Her differential diagnosis includes: Inevitable versus completed versus threatened miscarriage versus other  Patient had a CBC, metabolic panel, which were both unremarkable  She underwent a beta-hCG which is a 36  Her bedside ultrasound was nondiagnostic for IUP  Transvaginal ultrasound was ordered  Patient signed out at shift change pending transvaginal ultrasound results      ED Course         Critical Care Time  Procedures

## 2023-05-01 NOTE — Clinical Note
Lizeth Birch was seen and treated in our emergency department on 5/1/2023  Diagnosis:     Lizeth  may return to school on return date  She may return on this date: 05/02/2023         If you have any questions or concerns, please don't hesitate to call        Hazel Alejandra RN    ______________________________           _______________          _______________  Hospital Representative                              Date                                Time

## 2023-05-01 NOTE — DISCHARGE INSTRUCTIONS
-Please have blood drawn for beta-hCG quantitative level in 48 hours  Prescription is attached for blood drawl   -Today's beta-hCG was 835   -Please follow-up with [facility] obstetrics and gynecology in the next 72 hours to 1 week  You should call them to schedule an appointment and advised them of today's visit   -Please return to the emergency department if you begin to develop fever, chills, faintness, paleness of the skin, blue discoloration of the skin, severe abdominal pain, heavy bleeding, continued or worsening symptoms

## 2023-05-01 NOTE — ED PROVIDER NOTES
History  Chief Complaint   Patient presents with    Vaginal Bleeding - Pregnant     Pt believes she is 6 weeks preg and c/o spotting for the past 2 days and now has clots  76-year-old female 6 weeks pregnant presenting due to vaginal bleeding and passage of clot  Patient states that 2 days ago she started having slight pelvic pressure and spotting and this morning patient states she is bleeding more and passing clot from her vagina and thinks she is having a miscarriage  Patient denies any right upper quadrant pain, chest pain, shortness of breath, swelling in her hands or feet, abdominal pain, fever, chills, nausea, vomiting  Prior to Admission Medications   Prescriptions Last Dose Informant Patient Reported? Taking? guaiFENesin (MUCINEX) 600 mg 12 hr tablet   No No   Sig: Take 2 tablets (1,200 mg total) by mouth every 12 (twelve) hours   Patient not taking: Reported on 10/19/2022   naproxen (Naprosyn) 500 mg tablet   No No   Sig: Take 1 tablet (500 mg total) by mouth 2 (two) times a day with meals   Patient not taking: Reported on 10/19/2022   ondansetron (Zofran ODT) 4 mg disintegrating tablet   No No   Sig: Take 1 tablet (4 mg total) by mouth every 6 (six) hours as needed for nausea or vomiting   Patient not taking: Reported on 10/19/2022   rizatriptan (Maxalt) 10 mg tablet   No No   Sig: Take 1 tablet (10 mg total) by mouth as needed for migraine Take at the onset of migraine; if symptoms continue or return, may take another dose at least 2 hours after first dose  Take no more than 2 doses in a day     topiramate (Topamax) 25 mg tablet   No No   Sig: Take 1 tablet (25 mg total) by mouth daily   venlafaxine (EFFEXOR-XR) 37 5 mg 24 hr capsule   No No   Sig: Take 1 capsule (37 5 mg total) by mouth daily with breakfast   Patient not taking: Reported on 10/19/2022      Facility-Administered Medications: None       Past Medical History:   Diagnosis Date    Anxiety     Depression        History reviewed  No pertinent surgical history  Family History   Problem Relation Age of Onset    No Known Problems Mother     No Known Problems Father     Diabetes Paternal Grandfather     Cancer Paternal Grandfather     Breast cancer Family 48        MGM's sister     Migraines Maternal Grandmother     Cervical cancer Neg Hx     Colon cancer Neg Hx     Ovarian cancer Neg Hx     Uterine cancer Neg Hx     Sudden death Neg Hx      I have reviewed and agree with the history as documented  E-Cigarette/Vaping    E-Cigarette Use Never User      E-Cigarette/Vaping Substances     Social History     Tobacco Use    Smoking status: Never    Smokeless tobacco: Never   Vaping Use    Vaping Use: Never used   Substance Use Topics    Alcohol use: Yes    Drug use: Yes     Types: Marijuana        Review of Systems   Constitutional: Negative for chills and fever  HENT: Negative for ear pain and sore throat  Eyes: Negative for pain and visual disturbance  Respiratory: Negative for cough and shortness of breath  Cardiovascular: Negative for chest pain and palpitations  Gastrointestinal: Negative for abdominal pain, diarrhea, nausea and vomiting  Genitourinary: Positive for pelvic pain and vaginal bleeding  Negative for dysuria, hematuria, vaginal discharge and vaginal pain  Musculoskeletal: Negative for arthralgias and back pain  Skin: Negative for color change and rash  Neurological: Negative for dizziness, seizures, syncope, weakness, light-headedness, numbness and headaches  All other systems reviewed and are negative        Physical Exam  ED Triage Vitals   Temperature Pulse Respirations Blood Pressure SpO2   05/01/23 0652 05/01/23 0632 05/01/23 0632 05/01/23 0632 05/01/23 0632   98 3 °F (36 8 °C) 72 18 122/76 99 %      Temp Source Heart Rate Source Patient Position - Orthostatic VS BP Location FiO2 (%)   05/01/23 0652 -- -- -- --   Oral          Pain Score       --                    Orthostatic Vital Signs  Vitals:    05/01/23 0632   BP: 122/76   Pulse: 72       Physical Exam  Vitals and nursing note reviewed  Constitutional:       General: She is not in acute distress  Appearance: She is well-developed  HENT:      Head: Normocephalic and atraumatic  Nose: Nose normal  No congestion  Eyes:      Extraocular Movements: Extraocular movements intact  Conjunctiva/sclera: Conjunctivae normal    Cardiovascular:      Rate and Rhythm: Normal rate and regular rhythm  Pulses: Normal pulses  Heart sounds: Normal heart sounds  No murmur heard  Pulmonary:      Effort: Pulmonary effort is normal  No respiratory distress  Breath sounds: Normal breath sounds  Chest:      Chest wall: No tenderness  Abdominal:      General: Abdomen is flat  Bowel sounds are normal       Palpations: Abdomen is soft  Tenderness: There is no abdominal tenderness  There is no right CVA tenderness or left CVA tenderness  Musculoskeletal:         General: No deformity or signs of injury  Normal range of motion  Cervical back: Normal range of motion and neck supple  No rigidity or tenderness  Skin:     General: Skin is warm and dry  Findings: No bruising, lesion or rash  Neurological:      Mental Status: She is alert           ED Medications  Medications - No data to display    Diagnostic Studies  Results Reviewed     Procedure Component Value Units Date/Time    hCG, quantitative [[id number]]  (Abnormal) Collected: 05/01/23 0646    Lab Status: Final result Specimen: Blood from Arm, Left Updated: 05/01/23 0753     HCG, Quant 835 mIU/mL     Narrative:       Expected Ranges:     Approximate               Approximate HCG  Gestation age          Concentration ( mIU/mL)  _____________          ______________________   Timothycj Deidre                      HCG values  0 2-1                       5-50  1-2                           2-3                         100-5000  3-4 500-24447  4-5                         1000-25410  5-6                         74990-151954  6-8                         48110-311379  8-12                        43922-644400      POCT pregnancy, urine [[id number]]     Lab Status: No result     Lipase [[id number]]  (Normal) Collected: 05/01/23 0646    Lab Status: Final result Specimen: Blood from Arm, Left Updated: 05/01/23 0717     Lipase 13 u/L     Comprehensive metabolic panel [[id number]] Collected: 05/01/23 0646    Lab Status: Final result Specimen: Blood from Arm, Left Updated: 05/01/23 0717     Sodium 136 mmol/L      Potassium 3 6 mmol/L      Chloride 105 mmol/L      CO2 24 mmol/L      ANION GAP 7 mmol/L      BUN 9 mg/dL      Creatinine 0 69 mg/dL      Glucose 94 mg/dL      Calcium 9 0 mg/dL      AST 16 U/L      ALT 12 U/L      Alkaline Phosphatase 53 U/L      Total Protein 7 3 g/dL      Albumin 4 2 g/dL      Total Bilirubin 0 55 mg/dL      eGFR 123 ml/min/1 73sq m     Narrative:      [place] guidelines for Chronic Kidney Disease (CKD):     Stage 1 with normal or high GFR (GFR > 90 mL/min/1 73 square meters)    Stage 2 Mild CKD (GFR = 60-89 mL/min/1 73 square meters)    Stage 3A Moderate CKD (GFR = 45-59 mL/min/1 73 square meters)    Stage 3B Moderate CKD (GFR = 30-44 mL/min/1 73 square meters)    Stage 4 Severe CKD (GFR = 15-29 mL/min/1 73 square meters)    Stage 5 End Stage CKD (GFR <15 mL/min/1 73 square meters)  Note: GFR calculation is accurate only with a steady state creatinine    CBC and differential [[id number]] Collected: 05/01/23 0646    Lab Status: Final result Specimen: Blood from Arm, Left Updated: 05/01/23 0700     WBC 6 95 Thousand/uL      RBC 4 04 Million/uL      Hemoglobin 11 6 g/dL      Hematocrit 35 9 %      MCV 89 fL      MCH 28 7 pg      MCHC 32 3 g/dL      RDW 12 1 %      MPV 10 2 fL      Platelets 830 Thousands/uL      nRBC 0 /100 WBCs      Neutrophils Relative 48 %      Immat GRANS % 0 %      Lymphocytes Relative 41 %      Monocytes Relative 8 %      Eosinophils Relative 2 %      Basophils Relative 1 %      Neutrophils Absolute 3 39 Thousands/µL      Immature Grans Absolute 0 03 Thousand/uL      Lymphocytes Absolute 2 82 Thousands/µL      Monocytes Absolute 0 55 Thousand/µL      Eosinophils Absolute 0 11 Thousand/µL      Basophils Absolute 0 05 Thousands/µL                  US OB < 14 weeks with transvaginal    (Results Pending)         Procedures  Procedures      ED Course  ED Course as of 05/01/23 0825   Mon May 01, 2023   19 79-year-old female 6 to 8 weeks pregnant presenting due to 2 days of vaginal spotting and today started passing clots with more bleeding  Labs ordered, bedside transabdominal ultrasound performed, no gestational sac appreciated  Official transvaginal ultrasound ordered  0710 Vitals reviewed, stable   0711 CBC and differential  WNL   0823 Pt signed out to Dr Agnieszka Kearney pending transvaginal US results  Dispo- follow up with Ob for reassessment and serial hCG  SBIRT 22yo+    Flowsheet Row Most Recent Value   Initial Alcohol Screen: US AUDIT-C     1  How often do you have a drink containing alcohol? 0 Filed at: 05/01/2023 0647   2  How many drinks containing alcohol do you have on a typical day you are drinking? 0 Filed at: 05/01/2023 0647   3a  Male UNDER 65: How often do you have five or more drinks on one occasion? 0 Filed at: 05/01/2023 0647   3b  FEMALE Any Age, or MALE 65+: How often do you have 4 or more drinks on one occassion? 0 Filed at: 05/01/2023 0647   Audit-C Score 0 Filed at: 05/01/2023 7908   JENNIFER: How many times in the past year have you    Used an illegal drug or used a prescription medication for non-medical reasons?  Never Filed at: 05/01/2023 9819                MDM      Disposition  Final diagnoses:   None     ED Disposition     None      Follow-up Information    None         Patient's Medications   Discharge Prescriptions    No medications on file     No discharge procedures on file  PDMP Review     None           ED Provider  Attending physically available and evaluated Lizeth Birch I managed the patient along with the ED Attending      Electronically Signed by         Terrence Cohen MD  05/01/23 2219

## 2023-05-01 NOTE — Clinical Note
Lizeth Birch was seen and treated in our emergency department on 5/1/2023  Diagnosis:     Lizeth  may return to work on return date  She may return on this date: 05/02/2023         If you have any questions or concerns, please don't hesitate to call        Gloria Green RN    ______________________________           _______________          _______________  Hospital Representative                              Date                                Time

## 2023-05-01 NOTE — ED CARE HANDOFF
Emergency Department Sign Out Note        Sign out and transfer of care from [facility]  See Separate Emergency Department note  The patient, Angel Pérez, was evaluated by the previous provider for [facility]  26-year-old female 6 weeks pregnant present for vaginal bleeding, bilateral infraumbilical tenderness  Bedside ultrasound, unconfirmed IUP, no free fluid in the pelvis  Pending transvaginal ultrasound  Quant 835   8 weeks by LMP  Current plan is to follow-up with OB/GYN and trend beta-hCG      Workup Completed:  Diagnostic Studies           Results Reviewed       Procedure Component Value Units Date/Time    hCG, quantitative [[id number]] (Abnormal) Collected: 05/01/23 0646    Lab Status: Final result Specimen: Blood from Arm, Left Updated: 05/01/23 0753     HCG, Quant 835 mIU/mL     Narrative:     Expected Ranges:   Approximate Approximate HCG   Gestation age Concentration ( mIU/mL)   _____________ ______________________   Horry Meckel HCG values   0 2-1 5-50   1-2    2-3 100-5000   3-4 500-25926   4-5 1000-29215   5-6 80215-480451   6-8 21992-003732   8-12 85222-474543     POCT pregnancy, urine [[id number]]     Lab Status: No result     Lipase [[id number]] (Normal) Collected: 05/01/23 0646    Lab Status: Final result Specimen: Blood from Arm, Left Updated: 05/01/23 0717     Lipase 13 u/L     Comprehensive metabolic panel [[id number]] Collected: 05/01/23 0646    Lab Status: Final result Specimen: Blood from Arm, Left Updated: 05/01/23 0717     Sodium 136 mmol/L      Potassium 3 6 mmol/L      Chloride 105 mmol/L      CO2 24 mmol/L      ANION GAP 7 mmol/L      BUN 9 mg/dL      Creatinine 0 69 mg/dL      Glucose 94 mg/dL      Calcium 9 0 mg/dL      AST 16 U/L      ALT 12 U/L      Alkaline Phosphatase 53 U/L      Total Protein 7 3 g/dL      Albumin 4 2 g/dL      Total Bilirubin 0 55 mg/dL      eGFR 123 ml/min/1 73sq m     Narrative:     [place] guidelines for Chronic Kidney Disease (CKD):  Stage 1 with normal or high GFR (GFR > 90 mL/min/1 73 square meters)    Stage 2 Mild CKD (GFR = 60-89 mL/min/1 73 square meters)    Stage 3A Moderate CKD (GFR = 45-59 mL/min/1 73 square meters)    Stage 3B Moderate CKD (GFR = 30-44 mL/min/1 73 square meters)    Stage 4 Severe CKD (GFR = 15-29 mL/min/1 73 square meters)    Stage 5 End Stage CKD (GFR <15 mL/min/1 73 square meters)   Note: GFR calculation is accurate only with a steady state creatinine    CBC and differential [[id number]] Collected: 23 0646    Lab Status: Final result Specimen: Blood from Arm, Left Updated: 23 0700     WBC 6 95 Thousand/uL      RBC 4 04 Million/uL      Hemoglobin 11 6 g/dL      Hematocrit 35 9 %      MCV 89 fL      MCH 28 7 pg      MCHC 32 3 g/dL      RDW 12 1 %      MPV 10 2 fL      Platelets 186 Thousands/uL      nRBC 0 /100 WBCs      Neutrophils Relative 48 %      Immat GRANS % 0 %      Lymphocytes Relative 41 %      Monocytes Relative 8 %      Eosinophils Relative 2 %      Basophils Relative 1 %      Neutrophils Absolute 3 39 Thousands/µL      Immature Grans Absolute 0 03 Thousand/uL      Lymphocytes Absolute 2 82 Thousands/µL      Monocytes Absolute 0 55 Thousand/µL      Eosinophils Absolute 0 11 Thousand/µL      Basophils Absolute 0 05 Thousands/µL          ED Course / Workup Pending (followup):  Pending transvaginal US and OBGYN consultation  ED Course as of 23 0569   [facility] May 01, 2023   0919 IMPRESSION:  No intrauterine gestation or adnexal mass identified  Differential remains early IUP, spontaneous  and ectopic pregnancy  Correlate with serial quantitative BHCG    0935 HCG QUANTITATIVE(!): 835     Procedures  Medical Decision Making  22yo female 8 weeks pregnant by LMP presents w/ 2 days of vaginal bleeding that was initially light and than became heavier w/ passage of clots and then return to light streaking of pads     Bedside US negative for IUP or free fluid in the pelvis  Noted mild bilateral infraumbilical abdominal tenderness on exam     Ddx miscarriage, missed , ectopic pregnancies, early pregnancy, threatened   Transvaginal US was negative for IUP  Patient is primarily seen by [facility] during pregnancy  Contacted OBGYN and advised for repeat quant HCG in 2 days and follow up w/ primary OBGYN w/i the week  Patient advised of plan and working ddx  Repeat abd exam showed no change in abdominal tenderness  Patient denies increase in vaginal bleeding at this time  Order placed for repeat quant HCG  Information regarding [address] [place] [address] attached to discharge paperwork  Strict return precautions given for signs of infections, severe abd pain, anemia, heavy bleeding, ectopic pregnancy  Patient understanding and in agreement w/ plan and discharged home to self care  Amount and/or Complexity of Data Reviewed  Labs: ordered  Decision-making details documented in ED Course  Radiology: ordered  Disposition  Final diagnoses:   Vaginal bleeding during pregnancy     Time reflects when diagnosis was documented in both MDM as applicable and the Disposition within this note     Time User Action Codes Description Comment    2023 10:18 AM Em Reyes Add [O46 90] Vaginal bleeding during pregnancy       ED Disposition     ED Disposition   Discharge    Condition   Stable    Date/Time   Mon May 1, 2023 10:18 AM    Comment   Lizeth Eyal discharge to home/self care                 Follow-up Information     Follow up With Specialties Details Why Contact Info Additional Information    Areli Shirley DO Family Medicine Schedule an appointment as soon as possible for a visit in 2 days   [address] [facility] [address] Emergency Department Emergency Medicine Go to  If symptoms worsen [address] [facility] Emergency Department, [address], [facility] [place], [place], [address] [facility] Obstetrics and Gynecology Schedule an appointment as soon as possible for a visit in 1 week  [address] [address]  [facility] [place], [facility], [facility] [place], [place], [address],         Discharge Medication List as of 5/1/2023 10:22 AM      CONTINUE these medications which have NOT CHANGED    Details   guaiFENesin (MUCINEX) 600 mg 12 hr tablet Take 2 tablets (1,200 mg total) by mouth every 12 (twelve) hours, Starting Thu 12/9/2021, Normal      naproxen (Naprosyn) 500 mg tablet Take 1 tablet (500 mg total) by mouth 2 (two) times a day with meals, Starting Wed 7/13/2022, Normal      ondansetron (Zofran ODT) 4 mg disintegrating tablet Take 1 tablet (4 mg total) by mouth every 6 (six) hours as needed for nausea or vomiting, Starting Wed 7/13/2022, Normal      rizatriptan (Maxalt) 10 mg tablet Take 1 tablet (10 mg total) by mouth as needed for migraine Take at the onset of migraine; if symptoms continue or return, may take another dose at least 2 hours after first dose  Take no more than 2 doses in a day , Starting Wed 10/19/2022, Normal      topiramate (Topamax) 25 mg tablet Take 1 tablet (25 mg total) by mouth daily, Starting Wed 10/19/2022, Normal      venlafaxine (EFFEXOR-XR) 37 5 mg 24 hr capsule Take 1 capsule (37 5 mg total) by mouth daily with breakfast, Starting Wed 11/17/2021, Normal           Outpatient Discharge Orders   hCG, quantitative   Standing Status: Future Standing Exp   Date: 05/01/24          ED Provider  Electronically Signed by     Candie Fritz MD  05/02/23 9755

## 2023-05-03 ENCOUNTER — TELEPHONE (OUTPATIENT)
Dept: OBGYN CLINIC | Facility: CLINIC | Age: 23
End: 2023-05-03

## 2023-05-03 ENCOUNTER — APPOINTMENT (OUTPATIENT)
Dept: LAB | Facility: CLINIC | Age: 23
End: 2023-05-03

## 2023-05-03 DIAGNOSIS — O46.90 VAGINAL BLEEDING DURING PREGNANCY: ICD-10-CM

## 2023-05-03 LAB — B-HCG SERPL-ACNC: 152 MIU/ML

## 2023-05-03 NOTE — TELEPHONE ENCOUNTER
Pt called because she was seen in the ER a few days ago with bleeding during pregnancy  Patient completed 2nd HCG today which has not resulted yet  She was interested in transferring care to us  Patient aware once we get the hcg results back we can go from there with scheduling appropriately  Patient is not having heavy bleeding, she is not bleeding through a pad every hour or two and she does not feel dizzy, lightheaded or SOB  Patient aware if heavy bleeding or if she feels any of those symptoms to report back to the ER patient understood we will be in contact tomorrow

## 2023-05-04 DIAGNOSIS — O20.9 BLEEDING IN EARLY PREGNANCY: Primary | ICD-10-CM

## 2023-05-04 NOTE — TELEPHONE ENCOUNTER
Patient aware to complete labs, once results come through we will be in touch  Went over precautions and if any heavy bleeding, severe cramping, feel dizzy, lightheaded or SOB to report to ED  Patient understood

## 2023-05-05 ENCOUNTER — APPOINTMENT (OUTPATIENT)
Dept: LAB | Facility: CLINIC | Age: 23
End: 2023-05-05

## 2023-05-05 DIAGNOSIS — O20.9 BLEEDING IN EARLY PREGNANCY: ICD-10-CM

## 2023-05-05 LAB
ABO GROUP BLD: NORMAL
BLD GP AB SCN SERPL QL: NEGATIVE
RH BLD: POSITIVE
SPECIMEN EXPIRATION DATE: NORMAL

## 2023-05-06 LAB
B-HCG SERPL-ACNC: 69 MIU/ML
PROGEST SERPL-MCNC: 0.5 NG/ML

## 2023-05-08 ENCOUNTER — TELEPHONE (OUTPATIENT)
Dept: OBGYN CLINIC | Facility: CLINIC | Age: 23
End: 2023-05-08

## 2023-05-08 DIAGNOSIS — O03.9 SPONTANEOUS ABORTION: Primary | ICD-10-CM

## 2023-05-08 NOTE — TELEPHONE ENCOUNTER
Pt called and said she wants to establish care with us for her care  She recently had a miscarriage  She would like a call back

## 2023-05-09 ENCOUNTER — APPOINTMENT (OUTPATIENT)
Dept: LAB | Facility: CLINIC | Age: 23
End: 2023-05-09

## 2023-05-09 ENCOUNTER — OFFICE VISIT (OUTPATIENT)
Dept: URGENT CARE | Facility: CLINIC | Age: 23
End: 2023-05-09

## 2023-05-09 VITALS
OXYGEN SATURATION: 98 % | DIASTOLIC BLOOD PRESSURE: 61 MMHG | RESPIRATION RATE: 18 BRPM | HEART RATE: 94 BPM | BODY MASS INDEX: 26.68 KG/M2 | WEIGHT: 145 LBS | HEIGHT: 62 IN | TEMPERATURE: 98 F | SYSTOLIC BLOOD PRESSURE: 106 MMHG

## 2023-05-09 DIAGNOSIS — J02.9 SORE THROAT: Primary | ICD-10-CM

## 2023-05-09 DIAGNOSIS — O03.9 SPONTANEOUS ABORTION: ICD-10-CM

## 2023-05-09 LAB
B-HCG SERPL-ACNC: 19 MIU/ML
S PYO AG THROAT QL: NEGATIVE

## 2023-05-09 NOTE — PROGRESS NOTES
[facility]        NAME: Garrett Acosta is a 20 y o  female  : 2000    MRN: [id number]  DATE: May 9, 2023  TIME: 11:02 AM    Assessment and Plan   Sore throat [J02 9]  1  Sore throat  POCT rapid strepA    Throat culture      Pt presents with history concerning for possible strep  Rapid strep in clinic is negative, will send for culture and notify patient of any positive result  Discussed symptomatic treatments  Patient Instructions     Patient Instructions   Rapid strep in the office is negative  Result will be sent for culture as the rapid test is not always accurate  If the result comes back positive we will call you to start antibiotic treatment  If you see the result is positive in your MyChart and do not receive a call within 1-2 hours call the office to request antibiotics  Over the counter antihistamine and/or Flonase as needed  Salt water gargles  Over the counter throat lozenges such as Cepocal or Chloroseptic  If symptoms are not improved in 3-5 days, follow-up with PCP  If symptoms worsen or new symptoms such as fever, drooling, voice changes, anterior neck pain, or difficulty swallowing develop report to the emergency room immediately  Follow up with PCP in 3-5 days  Proceed to  ER if symptoms worsen  Chief Complaint     Chief Complaint   Patient presents with   • Sore Throat     Exposed to someone with strep throat, having a sore throat for 2 days  Denies fever or chills  Taking otc nyquil the other day  History of Present Illness       20year old female presents with complaint of sore throat x 2 days  She notes some mild congestion, but denies fever, chills, and cough  Pt reports her boyfriend was exposed to strep  Review of Systems   Review of Systems   Constitutional: Negative for chills, fatigue and fever  HENT: Positive for congestion, rhinorrhea and sore throat  Negative for postnasal drip, trouble swallowing and voice change  Respiratory: Negative for cough and shortness of breath  Cardiovascular: Negative for chest pain  Gastrointestinal: Negative for diarrhea, nausea and vomiting  Current Medications       Current Outpatient Medications:   •  guaiFENesin (MUCINEX) 600 mg 12 hr tablet, Take 2 tablets (1,200 mg total) by mouth every 12 (twelve) hours (Patient not taking: Reported on 10/19/2022), Disp: 60 tablet, Rfl: 0  •  naproxen (Naprosyn) 500 mg tablet, Take 1 tablet (500 mg total) by mouth 2 (two) times a day with meals (Patient not taking: Reported on 10/19/2022), Disp: 30 tablet, Rfl: 0  •  ondansetron (Zofran ODT) 4 mg disintegrating tablet, Take 1 tablet (4 mg total) by mouth every 6 (six) hours as needed for nausea or vomiting (Patient not taking: Reported on 10/19/2022), Disp: 20 tablet, Rfl: 0  •  rizatriptan (Maxalt) 10 mg tablet, Take 1 tablet (10 mg total) by mouth as needed for migraine Take at the onset of migraine; if symptoms continue or return, may take another dose at least 2 hours after first dose  Take no more than 2 doses in a day  (Patient not taking: Reported on 5/9/2023), Disp: 9 tablet, Rfl: 3  •  topiramate (Topamax) 25 mg tablet, Take 1 tablet (25 mg total) by mouth daily (Patient not taking: Reported on 5/9/2023), Disp: 90 tablet, Rfl: 3  •  venlafaxine (EFFEXOR-XR) 37 5 mg 24 hr capsule, Take 1 capsule (37 5 mg total) by mouth daily with breakfast (Patient not taking: Reported on 10/19/2022), Disp: 30 capsule, Rfl: 1    Current Allergies     Allergies as of 05/09/2023   • (No Known Allergies)            The following portions of the patient's history were reviewed and updated as appropriate: allergies, current medications, past family history, past medical history, past social history, past surgical history and problem list      Past Medical History:   Diagnosis Date   • Anxiety    • Depression        No past surgical history on file      Family History   Problem Relation Age of Onset   • No "Known Problems Mother    • No Known Problems Father    • Diabetes Paternal Grandfather    • Cancer Paternal Grandfather    • Breast cancer Family 48        MGM's sister    • Migraines Maternal Grandmother    • Cervical cancer Neg Hx    • Colon cancer Neg Hx    • Ovarian cancer Neg Hx    • Uterine cancer Neg Hx    • Sudden death Neg Hx          Medications have been verified  Objective   /61   Pulse 94   Temp 98 °F (36 7 °C) (Temporal)   Resp 18   Ht 5' 2\" (1 575 m)   Wt 65 8 kg (145 lb)   LMP 02/28/2023 (Exact Date)   SpO2 98%   Breastfeeding Unknown   BMI 26 52 kg/m²   Patient's last menstrual period was 02/28/2023 (exact date)  Physical Exam     Physical Exam  Vitals and nursing note reviewed  Constitutional:       General: She is not in acute distress  Appearance: Normal appearance  She is not ill-appearing or toxic-appearing  HENT:      Head: Normocephalic and atraumatic  Jaw: No trismus  Right Ear: Hearing, tympanic membrane, ear canal and external ear normal  There is no impacted cerumen  No foreign body  Left Ear: Hearing, tympanic membrane, ear canal and external ear normal  There is no impacted cerumen  No foreign body  Nose: No nasal deformity, mucosal edema, congestion or rhinorrhea  Right Nostril: No foreign body, epistaxis or occlusion  Left Nostril: No foreign body, epistaxis or occlusion  Right Turbinates: Not enlarged, swollen or pale  Left Turbinates: Not enlarged, swollen or pale  Mouth/Throat:      Lips: Pink  No lesions  Mouth: Mucous membranes are moist  No injury, oral lesions or angioedema  Dentition: Normal dentition  Tongue: No lesions  Tongue does not deviate from midline  Palate: No mass and lesions  Pharynx: Uvula midline  Posterior oropharyngeal erythema present  No pharyngeal swelling, oropharyngeal exudate or uvula swelling        Tonsils: No tonsillar exudate or tonsillar " "abscesses  Comments: Mild erythema of the posterior oropharynx  Eyes:      General: Lids are normal  Gaze aligned appropriately  No allergic shiner  Extraocular Movements: Extraocular movements intact  Cardiovascular:      Rate and Rhythm: Normal rate  Pulmonary:      Effort: Pulmonary effort is normal       Comments: Patient speaking in full sentences with no increased respiratory effort  No audible wheezing or stridor  Lymphadenopathy:      Cervical: No cervical adenopathy  Skin:     General: Skin is warm and dry  Neurological:      Mental Status: She is alert and oriented to person, place, and time  Coordination: Coordination is intact  Gait: Gait is intact  Psychiatric:         Attention and Perception: Attention and perception normal          Mood and Affect: Mood and affect normal          Speech: Speech normal          Behavior: Behavior is cooperative  Note: Portions of this record may have been created with voice recognition software  Occasional wrong word or \"sound a like\" substitutions may have occurred due to the inherent limitations of voice recognition software  Please read the chart carefully and recognize, using context, where substitutions have occurred  *      "

## 2023-05-11 LAB — BACTERIA THROAT CULT: NORMAL

## 2023-05-12 ENCOUNTER — OFFICE VISIT (OUTPATIENT)
Dept: OBGYN CLINIC | Facility: CLINIC | Age: 23
End: 2023-05-12

## 2023-05-12 VITALS
BODY MASS INDEX: 28.16 KG/M2 | HEIGHT: 62 IN | WEIGHT: 153 LBS | SYSTOLIC BLOOD PRESSURE: 100 MMHG | DIASTOLIC BLOOD PRESSURE: 68 MMHG

## 2023-05-12 DIAGNOSIS — O03.9 MISCARRIAGE: ICD-10-CM

## 2023-05-12 DIAGNOSIS — R63.5 WEIGHT GAIN: Primary | ICD-10-CM

## 2023-05-12 DIAGNOSIS — L65.9 HAIR LOSS: ICD-10-CM

## 2023-05-12 NOTE — PROGRESS NOTES
Assessment/Plan:     Problem List Items Addressed This Visit    None  Visit Diagnoses     Weight gain    -  Primary    Relevant Orders    TSH, 3rd generation with Free T4 reflex    Hair loss        Relevant Orders    TSH, 3rd generation with Free T4 reflex    Miscarriage        Relevant Orders    hCG, quantitative          1  HCG quant ordered to be done on 5/17 for weekly evaluation of hcg trend until normalized  2  TSH ordered for continued weight gain and hair loss  3  Patient desires future hormone testing for possible PCOS  Reviewed PCOS and discussed criteria with possible treatments  Counseled patient that testing would be most appropriate in 3 months due to recent miscarriage (if desiring, hormone tests)  4  Discussed spontaneous abortions/chemical pregnancies with the patient and counseled patient that there could be multiple etiologies  Patient was rightfully tearful and provided emotional support  Discussed with patient that if she needs further assistance for counseling, we could provide referrals  5  Discussed future intentions with either BC/intended pregnancy  She reports desiring a future pregnancy and declines BC at this time  Reviewed with the patient to allow her body to heal and allow for one normal cycle before actively trying to conceive  Encouraged use of prenatal vitamins during TTC  6  Advised patient to call for an appointment- if she decides she wants birth control  7  Reviewed miscarriage precautions of when to call the office/seek immediate care in the ED; including severe pelvic pain, increased vaginal bleeding, fever, chills, and/or abnormal vaginal discharge/odor  8  RTO in 3 months for follow-up and to discuss PCOS testing/fertilty/birth control  Subjective:      Patient ID: Ines Drake is a 20 y o  female  Patient presents to the office for follow-up on recent miscarriage  This was an unintended but welcomed pregnancy   LMP: 02/28/23; +pregnancy test on April 9  She presented to the ED on 05/01/23 for vaginal spotting x 2-3 days and then started passing clots  TVUS done at bedside on 05/01 and did not indicate an IUP or gestational sac at that time  Initial hcg quant on 05/01 was 835 and has been appropriately trending down with 5/3: 152, 5/5: 69, and 5/9 19  Progesterone on 5/5 was 0 5  She reports that she is no longer bleeding; stopped on Saturday/Gagandeep May 6 or 7  Denies fever, chills, abnormal vaginal discharge/odor/irritation  No hx of STD/STI  She reports inconsistent periods q 32 days; lasting 5 days  She reports that she previously utilized a JIMBO, which helped regulate her periods  Prior to [address], her period were very irregular ranging q 28- 90 days, with very heavy, clot-filled menses  She discontinued JIMBO approximately 2 years ago and reports continued improvement with her menses  However, she has recently noticed a change in abdominal weight gain, excessive acne, and hair loss  Denies hirsutism  The following portions of the patient's history were reviewed and updated as appropriate:   She  has a past medical history of Anxiety and Depression  She   Patient Active Problem List    Diagnosis Date Noted   • Viral infection, unspecified 12/22/2021   • Migraine with aura and without status migrainosus, not intractable 10/27/2021   • Vitamin D deficiency 10/27/2021   • Syncope 10/27/2021     She  has no past surgical history on file  Her family history includes Breast cancer (age of onset: 48) in her family; Cancer in her paternal grandfather; Diabetes in her paternal grandfather; Migraines in her maternal grandmother; No Known Problems in her father and mother  She  reports that she has never smoked  She has never used smokeless tobacco  She reports that she does not currently use alcohol  She reports that she does not currently use drugs after having used the following drugs: Marijuana    Current Outpatient Medications   Medication Sig Dispense Refill   • Prenatal Multivit-Min-Fe-FA (PRE-EMIR FORMULA PO) Take by mouth     • guaiFENesin (MUCINEX) 600 mg 12 hr tablet Take 2 tablets (1,200 mg total) by mouth every 12 (twelve) hours (Patient not taking: Reported on 10/19/2022) 60 tablet 0   • naproxen (Naprosyn) 500 mg tablet Take 1 tablet (500 mg total) by mouth 2 (two) times a day with meals 30 tablet 0   • ondansetron (Zofran ODT) 4 mg disintegrating tablet Take 1 tablet (4 mg total) by mouth every 6 (six) hours as needed for nausea or vomiting 20 tablet 0   • rizatriptan (Maxalt) 10 mg tablet Take 1 tablet (10 mg total) by mouth as needed for migraine Take at the onset of migraine; if symptoms continue or return, may take another dose at least 2 hours after first dose  Take no more than 2 doses in a day  9 tablet 3   • topiramate (Topamax) 25 mg tablet Take 1 tablet (25 mg total) by mouth daily 90 tablet 3   • venlafaxine (EFFEXOR-XR) 37 5 mg 24 hr capsule Take 1 capsule (37 5 mg total) by mouth daily with breakfast 30 capsule 1     No current facility-administered medications for this visit  Current Outpatient Medications on File Prior to Visit   Medication Sig   • Prenatal Multivit-Min-Fe-FA (PRE-EMIR FORMULA PO) Take by mouth   • guaiFENesin (MUCINEX) 600 mg 12 hr tablet Take 2 tablets (1,200 mg total) by mouth every 12 (twelve) hours (Patient not taking: Reported on 10/19/2022)   • naproxen (Naprosyn) 500 mg tablet Take 1 tablet (500 mg total) by mouth 2 (two) times a day with meals   • ondansetron (Zofran ODT) 4 mg disintegrating tablet Take 1 tablet (4 mg total) by mouth every 6 (six) hours as needed for nausea or vomiting   • rizatriptan (Maxalt) 10 mg tablet Take 1 tablet (10 mg total) by mouth as needed for migraine Take at the onset of migraine; if symptoms continue or return, may take another dose at least 2 hours after first dose  Take no more than 2 doses in a day     • topiramate (Topamax) 25 mg tablet Take 1 tablet (25 mg total) by "mouth daily   • venlafaxine (EFFEXOR-XR) 37 5 mg 24 hr capsule Take 1 capsule (37 5 mg total) by mouth daily with breakfast     No current facility-administered medications on file prior to visit  She has No Known Allergies       Review of Systems   Constitutional: Negative for chills and fever  Respiratory: Negative for shortness of breath  Cardiovascular: Negative for chest pain and palpitations  Gastrointestinal: Negative for abdominal pain  Genitourinary: Negative for dyspareunia, dysuria, frequency, pelvic pain, urgency, vaginal bleeding, vaginal discharge and vaginal pain  Objective:      /68 (BP Location: Left arm, Patient Position: Sitting, Cuff Size: Standard)   Ht 5' 2\" (1 575 m)   Wt 69 4 kg (153 lb)   LMP 02/28/2023 (Exact Date)   BMI 27 98 kg/m²          Physical Exam  Vitals and nursing note reviewed  Constitutional:       General: She is not in acute distress  Appearance: Normal appearance  She is normal weight  HENT:      Head: Normocephalic  Eyes:      Conjunctiva/sclera: Conjunctivae normal    Cardiovascular:      Rate and Rhythm: Normal rate and regular rhythm  Heart sounds: Normal heart sounds  Pulmonary:      Effort: Pulmonary effort is normal  No respiratory distress  Breath sounds: Normal breath sounds  Abdominal:      General: Abdomen is flat  Palpations: Abdomen is soft  Tenderness: There is no right CVA tenderness or left CVA tenderness  Genitourinary:     General: Normal vulva  Exam position: Lithotomy position  Pubic Area: No rash or pubic lice  Labia:         Right: No rash or tenderness  Left: No rash or tenderness  Urethra: No urethral pain  Vagina: Normal  No vaginal discharge  Uterus: Normal  Not tender  Adnexa: Right adnexa normal and left adnexa normal         Right: No mass or tenderness  Left: No mass or tenderness       Musculoskeletal:         General: Normal " range of motion  Cervical back: Normal range of motion  Lymphadenopathy:      Lower Body: No right inguinal adenopathy  No left inguinal adenopathy  Skin:     General: Skin is warm  Neurological:      Mental Status: She is alert and oriented to person, place, and time  Psychiatric:         Mood and Affect: Mood normal          Behavior: Behavior normal          Thought Content:  Thought content normal          Judgment: Judgment normal

## 2023-05-15 ENCOUNTER — APPOINTMENT (OUTPATIENT)
Dept: LAB | Facility: CLINIC | Age: 23
End: 2023-05-15

## 2023-05-15 ENCOUNTER — OFFICE VISIT (OUTPATIENT)
Dept: URGENT CARE | Facility: CLINIC | Age: 23
End: 2023-05-15

## 2023-05-15 VITALS
HEART RATE: 75 BPM | BODY MASS INDEX: 28.16 KG/M2 | DIASTOLIC BLOOD PRESSURE: 69 MMHG | OXYGEN SATURATION: 100 % | HEIGHT: 62 IN | WEIGHT: 153 LBS | RESPIRATION RATE: 14 BRPM | TEMPERATURE: 97.3 F | SYSTOLIC BLOOD PRESSURE: 111 MMHG

## 2023-05-15 DIAGNOSIS — R63.5 WEIGHT GAIN: ICD-10-CM

## 2023-05-15 DIAGNOSIS — O03.9 MISCARRIAGE: ICD-10-CM

## 2023-05-15 DIAGNOSIS — J02.9 VIRAL PHARYNGITIS: Primary | ICD-10-CM

## 2023-05-15 DIAGNOSIS — L65.9 HAIR LOSS: ICD-10-CM

## 2023-05-15 LAB
B-HCG SERPL-ACNC: 4 MIU/ML
T4 FREE SERPL-MCNC: 0.97 NG/DL (ref 0.76–1.46)
TSH SERPL DL<=0.05 MIU/L-ACNC: 0.13 UIU/ML (ref 0.45–4.5)

## 2023-05-16 ENCOUNTER — APPOINTMENT (OUTPATIENT)
Dept: LAB | Facility: CLINIC | Age: 23
End: 2023-05-16

## 2023-05-16 ENCOUNTER — OFFICE VISIT (OUTPATIENT)
Dept: FAMILY MEDICINE CLINIC | Facility: CLINIC | Age: 23
End: 2023-05-16

## 2023-05-16 VITALS
OXYGEN SATURATION: 97 % | DIASTOLIC BLOOD PRESSURE: 70 MMHG | SYSTOLIC BLOOD PRESSURE: 110 MMHG | WEIGHT: 151 LBS | HEART RATE: 98 BPM | HEIGHT: 62 IN | BODY MASS INDEX: 27.79 KG/M2

## 2023-05-16 DIAGNOSIS — Z00.00 ANNUAL PHYSICAL EXAM: Primary | ICD-10-CM

## 2023-05-16 DIAGNOSIS — F32.A DEPRESSION, UNSPECIFIED DEPRESSION TYPE: ICD-10-CM

## 2023-05-16 DIAGNOSIS — J02.9 ACUTE VIRAL PHARYNGITIS: ICD-10-CM

## 2023-05-16 DIAGNOSIS — R53.83 OTHER FATIGUE: ICD-10-CM

## 2023-05-16 DIAGNOSIS — O03.9 SPONTANEOUS ABORTION: ICD-10-CM

## 2023-05-16 DIAGNOSIS — E55.9 VITAMIN D INSUFFICIENCY: ICD-10-CM

## 2023-05-16 NOTE — PROGRESS NOTES
[facility]        NAME: Velasquez Miller is a 20 y o  female  : 2000    MRN: [id number]  DATE: May 15, 2023  TIME: 8:09 PM    Assessment and Plan   Viral pharyngitis [J02 9]  1  Viral pharyngitis        Presents with symptoms and examination concerning for possible mono  She tested negative for strep and had a negative throat culture last week  Patient has mild tenderness to deep palpation of the left upper quadrant with slight splenomegaly consistent with mononucleosis  Instructed patient that she will need to follow-up with her family doctor tomorrow mono testing as we are not able to perform Monospot or EBV panels in our clinic  Patient Instructions     Patient Instructions   Ensure you are getting plenty of fluids and rest    Follow-up with PCP in the morning to request mono testing  Continue Flonase and Claritin as needed  Salt water gargles as needed  Continue throat lozenges such as Cepocal or Chloroseptic  If symptoms worsen or new symptoms such as fever, drooling, voice changes, anterior neck pain, or difficulty swallowing develop report to the emergency room immediately  Follow up with PCP in 3-5 days  Proceed to  ER if symptoms worsen  Chief Complaint     Chief Complaint   Patient presents with   • Sore Throat     Pt reports she still has sore throat and congestion as well as feeling very tired  She did not call PCP to inform them of her sx  History of Present Illness       20year old female presents with complaint of continued sore throat, congestion, and fatigue now ongoing for about a week  She was seen last week for similar symptoms and tested negative for strep with a negative follow-up culture  Pt reports sore throat and fatigue are worst symptoms currently  Denies abdominal pain and history of mono infection  Reports episodes of hot and chills  No cough  Has been taking OTC allergy medications and Flonase with no relief         Review of Systems Review of Systems   Constitutional: Positive for fatigue  Negative for chills and fever  HENT: Positive for congestion and sore throat  Negative for trouble swallowing and voice change  Respiratory: Negative for cough  Gastrointestinal: Negative for abdominal pain  Current Medications       Current Outpatient Medications:   •  guaiFENesin (MUCINEX) 600 mg 12 hr tablet, Take 2 tablets (1,200 mg total) by mouth every 12 (twelve) hours (Patient not taking: Reported on 10/19/2022), Disp: 60 tablet, Rfl: 0  •  naproxen (Naprosyn) 500 mg tablet, Take 1 tablet (500 mg total) by mouth 2 (two) times a day with meals, Disp: 30 tablet, Rfl: 0  •  ondansetron (Zofran ODT) 4 mg disintegrating tablet, Take 1 tablet (4 mg total) by mouth every 6 (six) hours as needed for nausea or vomiting, Disp: 20 tablet, Rfl: 0  •  Prenatal Multivit-Min-Fe-FA (PRE-EMIR FORMULA PO), Take by mouth, Disp: , Rfl:   •  rizatriptan (Maxalt) 10 mg tablet, Take 1 tablet (10 mg total) by mouth as needed for migraine Take at the onset of migraine; if symptoms continue or return, may take another dose at least 2 hours after first dose  Take no more than 2 doses in a day , Disp: 9 tablet, Rfl: 3  •  topiramate (Topamax) 25 mg tablet, Take 1 tablet (25 mg total) by mouth daily, Disp: 90 tablet, Rfl: 3  •  venlafaxine (EFFEXOR-XR) 37 5 mg 24 hr capsule, Take 1 capsule (37 5 mg total) by mouth daily with breakfast, Disp: 30 capsule, Rfl: 1    Current Allergies     Allergies as of 05/15/2023   • (No Known Allergies)            The following portions of the patient's history were reviewed and updated as appropriate: allergies, current medications, past family history, past medical history, past social history, past surgical history and problem list      Past Medical History:   Diagnosis Date   • Anxiety    • Depression        No past surgical history on file      Family History   Problem Relation Age of Onset   • No Known Problems Mother    • "No Known Problems Father    • Diabetes Paternal Grandfather    • Cancer Paternal Grandfather    • Breast cancer Family 48        MGM's sister    • Migraines Maternal Grandmother    • Cervical cancer Neg Hx    • Colon cancer Neg Hx    • Ovarian cancer Neg Hx    • Uterine cancer Neg Hx    • Sudden death Neg Hx          Medications have been verified  Objective   /69   Pulse 75   Temp (!) 97 3 °F (36 3 °C)   Resp 14   Ht 5' 2\" (1 575 m)   Wt 69 4 kg (153 lb)   SpO2 100%   BMI 27 98 kg/m²   No LMP recorded  Physical Exam     Physical Exam  Vitals and nursing note reviewed  Constitutional:       General: She is awake  She is not in acute distress  Appearance: Normal appearance  She is well-developed and well-groomed  She is not ill-appearing, toxic-appearing or diaphoretic  Comments: Pt appears fatigued  HENT:      Head: Normocephalic and atraumatic  Jaw: No trismus  Right Ear: Hearing, tympanic membrane, ear canal and external ear normal  There is no impacted cerumen  No foreign body  Left Ear: Hearing, tympanic membrane, ear canal and external ear normal  There is no impacted cerumen  No foreign body  Nose: Mucosal edema, congestion and rhinorrhea present  No nasal deformity  Right Nostril: No foreign body, epistaxis or occlusion  Left Nostril: No foreign body, epistaxis or occlusion  Right Turbinates: Swollen  Not enlarged or pale  Left Turbinates: Swollen  Not enlarged or pale  Comments: Bluish swollen turbinates     Mouth/Throat:      Lips: Pink  No lesions  Mouth: Mucous membranes are moist  No injury, oral lesions or angioedema  Dentition: Normal dentition  Tongue: No lesions  Tongue does not deviate from midline  Palate: No mass and lesions  Pharynx: Uvula midline  No pharyngeal swelling, oropharyngeal exudate, posterior oropharyngeal erythema or uvula swelling        Tonsils: No tonsillar exudate or " "tonsillar abscesses  Comments: Mild to moderate swelling and erythema of the posterior oropharynx   Eyes:      General: Lids are normal  Vision grossly intact  Gaze aligned appropriately  No allergic shiner  Extraocular Movements: Extraocular movements intact  Cardiovascular:      Rate and Rhythm: Normal rate  Pulmonary:      Effort: Pulmonary effort is normal       Comments: Patient is speaking in full sentences with no increased respiratory effort  No audible wheezing or stridor  Abdominal:      General: Abdomen is flat  Bowel sounds are normal       Palpations: Abdomen is soft  There is splenomegaly  Tenderness: There is abdominal tenderness in the left upper quadrant  Musculoskeletal:      Cervical back: Normal range of motion  Lymphadenopathy:      Cervical: Cervical adenopathy present  Right cervical: Superficial cervical adenopathy present  Left cervical: Superficial cervical adenopathy present  Skin:     General: Skin is warm and dry  Neurological:      Mental Status: She is alert and oriented to person, place, and time  Coordination: Coordination is intact  Gait: Gait is intact  Psychiatric:         Attention and Perception: Attention and perception normal          Mood and Affect: Mood and affect normal          Speech: Speech normal          Behavior: Behavior normal  Behavior is cooperative  Note: Portions of this record may have been created with voice recognition software  Occasional wrong word or \"sound a like\" substitutions may have occurred due to the inherent limitations of voice recognition software  Please read the chart carefully and recognize, using context, where substitutions have occurred  *      "

## 2023-05-16 NOTE — PATIENT INSTRUCTIONS
Ensure you are getting plenty of fluids and rest    Follow-up with PCP in the morning to request mono testing  Continue Flonase and Claritin as needed  Salt water gargles as needed  Continue throat lozenges such as Cepocal or Chloroseptic  If symptoms worsen or new symptoms such as fever, drooling, voice changes, anterior neck pain, or difficulty swallowing develop report to the emergency room immediately

## 2023-05-16 NOTE — PATIENT INSTRUCTIONS
Mononucleosis   WHAT YOU NEED TO KNOW:   What is mononucleosis (mono)? Mono is an infection caused by a virus  Mono is spread through saliva  What are the signs and symptoms of mono? • Extreme tiredness or weakness     • Sore throat or swollen tonsils    • Fever    • Tender, swollen lymph nodes on the sides and back of your neck    • Headache and muscle aches    • Night sweats    • Loss of appetite    How is mono diagnosed? Your healthcare provider will ask about your symptoms and examine you  You may need any of the following:  • A blood test  may show signs of infection or the virus that causes mono  • A throat swab  may be needed to check for infection  A healthcare provider will rub a cotton swab against the back of your throat  • An ultrasound or CT scan  may show inflammation or damage to your spleen or appendix  You may be given contrast liquid before the CT scan  Tell the healthcare provider if you have ever had an allergic reaction to contrast liquid  How is mono treated? Your symptoms may last for 4 weeks or longer  You may need any of the following:  • Acetaminophen  decreases pain and fever  It is available without a doctor's order  Ask how much to take and how often to take it  Follow directions  Acetaminophen can cause liver damage if not taken correctly  • NSAIDs , such as ibuprofen, help decrease swelling, pain, and fever  This medicine is available with or without a doctor's order  NSAIDs can cause stomach bleeding or kidney problems in certain people  If you take blood thinner medicine, always ask your healthcare provider if NSAIDs are safe for you  Always read the medicine label and follow directions  • Steroids  help decrease inflammation  • Antibiotics  may be needed if you also have a bacterial infection  How can I manage my symptoms? • Rest as needed  Slowly start to do more each day as you feel better  • Drink liquids as directed    Liquids will help prevent dehydration  Ask how much liquid to drink each day and which liquids are best for you  • Do not play sports or exercise for 3 to 4 weeks or as directed  When you return for your follow-up visit, your healthcare provider will tell you if you are able to return to full activity  How can I prevent the spread of mono? Do not share food or drinks  Do not kiss anyone  The virus may be in your saliva for several months after you feel better  Wash your hands often  Use soap and water  Wash your hands after you use the bathroom, change a child's diapers, or sneeze  Wash your hands before you prepare or eat food  Call 911 for any of the following:   • You have shortness of breath  • You are confused or have a seizure  When should I seek immediate care? • You have severe pain in your abdomen or shoulder  • You have trouble swallowing because of the pain  • You urinate very little or not at all  • Your arms or legs are weak  When should I contact my healthcare provider? • Your symptoms get worse, even after treatment  • You have questions or concerns about your condition or care  CARE AGREEMENT:   You have the right to help plan your care  Learn about your health condition and how it may be treated  Discuss treatment options with your healthcare providers to decide what care you want to receive  You always have the right to refuse treatment  The above information is an  only  It is not intended as medical advice for individual conditions or treatments  Talk to your doctor, nurse or pharmacist before following any medical regimen to see if it is safe and effective for you  © Copyright Prince Prabhakar 2022 Information is for End User's use only and may not be sold, redistributed or otherwise used for commercial purposes  Wellness Visit for Adults   AMBULATORY CARE:   A wellness visit  is when you see your healthcare provider to get screened for health problems   Your healthcare provider will also give you advice on how to stay healthy  Write down your questions so you remember to ask them  Ask your healthcare provider how often you should have a wellness visit  What happens at a wellness visit:  Your healthcare provider will ask about your health, and your family history of health problems  This includes high blood pressure, heart disease, and cancer  He or she will ask if you have symptoms that concern you, if you smoke, and about your mood  You may also be asked about your intake of medicines, supplements, food, and alcohol  Any of the following may be done:  • Your weight  will be checked  Your height may also be checked so your body mass index (BMI) can be calculated  Your BMI shows if you are at a healthy weight  • Your blood pressure  and heart rate will be checked  Your temperature may also be checked  • Blood and urine tests  may be done  Blood tests may be done to check your cholesterol levels  Abnormal cholesterol levels increase your risk for heart disease and stroke  You may also need a blood or urine test to check for diabetes if you are at increased risk  Urine tests may be done to look for signs of an infection or kidney disease  • A physical exam  includes checking your heartbeat and lungs with a stethoscope  Your healthcare provider may also check your skin to look for sun damage  • Screening tests  may be recommended  A screening test is done to check for diseases that may not cause symptoms  The screening tests you may need depend on your age, gender, family history, and lifestyle habits  For example, colorectal screening may be recommended if you are 48years old or older  Screening tests you need if you are a woman:   • A Pap smear  is used to screen for cervical cancer  Pap smears are usually done every 3 to 5 years depending on your age   You may need them more often if you have had abnormal Pap smear test results in the past  Ask your healthcare provider how often you should have a Pap smear  • A mammogram  is an x-ray of your breasts to screen for breast cancer  Experts recommend mammograms every 2 years starting at age 48 years  You may need a mammogram at age 52 years or younger if you have an increased risk for breast cancer  Talk to your healthcare provider about when you should start having mammograms and how often you need them  Vaccines you may need:   • Get an influenza vaccine  every year  The influenza vaccine protects you from the flu  Several types of viruses cause the flu  The viruses change over time, so new vaccines are made each year  • Get a tetanus-diphtheria (Td) booster vaccine  every 10 years  This vaccine protects you against tetanus and diphtheria  Tetanus is a severe infection that may cause painful muscle spasms and lockjaw  Diphtheria is a severe bacterial infection that causes a thick covering in the back of your mouth and throat  • Get a human papillomavirus (HPV) vaccine  if you are female and aged 23 to 32 or male 23 to 24 and never received it  This vaccine protects you from HPV infection  HPV is the most common infection spread by sexual contact  HPV may also cause vaginal, penile, and anal cancers  • Get a pneumococcal vaccine  if you are aged 72 years or older  The pneumococcal vaccine is an injection given to protect you from pneumococcal disease  Pneumococcal disease is an infection caused by pneumococcal bacteria  The infection may cause pneumonia, meningitis, or an ear infection  • Get a shingles vaccine  if you are 60 or older, even if you have had shingles before  The shingles vaccine is an injection to protect you from the varicella-zoster virus  This is the same virus that causes chickenpox  Shingles is a painful rash that develops in people who had chickenpox or have been exposed to the virus  How to eat healthy:  My Plate is a model for planning healthy meals   It shows the types and amounts of foods that should go on your plate  Fruits and vegetables make up about half of your plate, and grains and protein make up the other half  A serving of dairy is included on the side of your plate  The amount of calories and serving sizes you need depends on your age, gender, weight, and height  Examples of healthy foods are listed below:  • Eat a variety of vegetables  such as dark green, red, and orange vegetables  You can also include canned vegetables low in sodium (salt) and frozen vegetables without added butter or sauces  • Eat a variety of fresh fruits , canned fruit in 100% juice, frozen fruit, and dried fruit  • Include whole grains  At least half of the grains you eat should be whole grains  Examples include whole-wheat bread, wheat pasta, brown rice, and whole-grain cereals such as oatmeal     • Eat a variety of protein foods such as seafood (fish and shellfish), lean meat, and poultry without skin (turkey and chicken)  Examples of lean meats include pork leg, shoulder, or tenderloin, and beef round, sirloin, tenderloin, and extra lean ground beef  Other protein foods include eggs and egg substitutes, beans, peas, soy products, nuts, and seeds  • Choose low-fat dairy products such as skim or 1% milk or low-fat yogurt, cheese, and cottage cheese  • Limit unhealthy fats  such as butter, hard margarine, and shortening  Exercise:  Exercise at least 30 minutes per day on most days of the week  Some examples of exercise include walking, biking, dancing, and swimming  You can also fit in more physical activity by taking the stairs instead of the elevator or parking farther away from stores  Include muscle strengthening activities 2 days each week  Regular exercise provides many health benefits  It helps you manage your weight, and decreases your risk for type 2 diabetes, heart disease, stroke, and high blood pressure  Exercise can also help improve your mood   Ask your healthcare provider about the best exercise plan for you  General health and safety guidelines:   • Do not smoke  Nicotine and other chemicals in cigarettes and cigars can cause lung damage  Ask your healthcare provider for information if you currently smoke and need help to quit  E-cigarettes or smokeless tobacco still contain nicotine  Talk to your healthcare provider before you use these products  • Limit alcohol  A drink of alcohol is 12 ounces of beer, 5 ounces of wine, or 1½ ounces of liquor  • Lose weight, if needed  Being overweight increases your risk of certain health conditions  These include heart disease, high blood pressure, type 2 diabetes, and certain types of cancer  • Protect your skin  Do not sunbathe or use tanning beds  Use sunscreen with a SPF 15 or higher  Apply sunscreen at least 15 minutes before you go outside  Reapply sunscreen every 2 hours  Wear protective clothing, hats, and sunglasses when you are outside  • Drive safely  Always wear your seatbelt  Make sure everyone in your car wears a seatbelt  A seatbelt can save your life if you are in an accident  Do not use your cell phone when you are driving  This could distract you and cause an accident  Pull over if you need to make a call or send a text message  • Practice safe sex  Use latex condoms if are sexually active and have more than one partner  Your healthcare provider may recommend screening tests for sexually transmitted infections (STIs)  • Wear helmets, lifejackets, and protective gear  Always wear a helmet when you ride a bike or motorcycle, go skiing, or play sports that could cause a head injury  Wear protective equipment when you play sports  Wear a lifejacket when you are on a boat or doing water sports  © Copyright Tyna Leyden 2022 Information is for End User's use only and may not be sold, redistributed or otherwise used for commercial purposes  The above information is an  only   It is not intended as medical advice for individual conditions or treatments  Talk to your doctor, nurse or pharmacist before following any medical regimen to see if it is safe and effective for you

## 2023-05-16 NOTE — PROGRESS NOTES
"Assessment/Plan:   Diagnoses and all orders for this visit:    Other fatigue  Acute viral pharyngitis  -     Mononucleosis screen; Future  -     EBV acute panel; Future  - BF's daughter tested POS for Strep   - pt has tested NEG  - has been feeling unwell for the past week   - (+) sore throat, diminished appetite   - per UC note from 5/15 - \"Presents with symptoms and examination concerning for possible mono  She tested negative for strep and had a negative throat culture last week  Patient has mild tenderness to deep palpation of the left upper quadrant with slight splenomegaly consistent with mononucleosis  Instructed patient that she will need to follow-up with her family doctor tomorrow mono testing as we are not able to perform Monospot or EBV panels in our clinic\"  - testing ordered and educational handout re: MONO given to pt  - discussed treatment options - OTC Tylenol/Motrin, rest and hydration     Spontaneous   Depression, unspecified depression type  - does have a Therapist - last OV was in the past month   - in the process of switching given insurance changes - referred to [employer]   - recently had a spontaneous  - BF supportive but family not     Vitamin D insufficiency  -     Vitamin D 25 hydroxy; Future          Subjective:    Patient ID: Gael Contreras is a 20 y o  female    HPI   Gael Contreras is a 23 y o  female who presents to the office for an annual exam  1) Annual   - PMHx: migraines  - allergies: NKDA  - Meds: none   - PSHx: wisdom teeth extraction   - FHx: PGF (DM, Cancer), denies FHx of cervical, colon, ovarian, uterine ca or sudden cardiac death  - Immunizations: DTaP 2020, COVID IMMs but no Booster   - GYN Hx: [facility] - s/p miscarriage 2023   - Hm: EGD/Cscope 3/2020 - Dr Prince Reyna - gastritis   - diet/exercise: exercise: GYM, diet: \"anything and everything\"   - social: denies tob/illicts, drinks 1x/week    - sexual Hx: sexually active with " "BF, declined STD screening   - last vision: wears glasses PRN   - last dental: has Invisalin   2) Depression/PTSD  - does have a Therapist - last OV was in the past month   - in the process of switching given insurance changes   - recently had a spontaneous  - BF supportive but family not   3) sick s/s   - BF's daughter tested POS for Strep   - pt has tested NEG  - has been feeling unwell for the past week   - denies F/C/N/V/CP/palpitations/SOB/wheezing/abd pain  - (+) sore throat, diminished appetite   - per UC note from 5/15 - \"Presents with symptoms and examination concerning for possible mono  She tested negative for strep and had a negative throat culture last week  Patient has mild tenderness to deep palpation of the left upper quadrant with slight splenomegaly consistent with mononucleosis  Instructed patient that she will need to follow-up with her family doctor tomorrow mono testing as we are not able to perform Monospot or EBV panels in our clinic\"      The following portions of the patient's history were reviewed and updated as appropriate: allergies, current medications, past family history, past medical history, past social history, past surgical history and problem list     Review of Systems  as per HPI    Objective:  /70   Pulse 98   Ht 5' 2\" (1 575 m)   Wt 68 5 kg (151 lb)   SpO2 97%   BMI 27 62 kg/m²    Physical Exam  Vitals reviewed  Constitutional:       General: She is not in acute distress  Appearance: Normal appearance  She is not ill-appearing, toxic-appearing or diaphoretic  HENT:      Head: Normocephalic and atraumatic  Right Ear: Tympanic membrane, ear canal and external ear normal  There is no impacted cerumen  Left Ear: Tympanic membrane, ear canal and external ear normal  There is no impacted cerumen  Nose: Nose normal       Mouth/Throat:      Mouth: Mucous membranes are moist       Pharynx: Oropharynx is clear   No oropharyngeal exudate or " posterior oropharyngeal erythema  Eyes:      General: No scleral icterus  Right eye: No discharge  Left eye: No discharge  Extraocular Movements: Extraocular movements intact  Conjunctiva/sclera: Conjunctivae normal    Cardiovascular:      Rate and Rhythm: Normal rate and regular rhythm  Heart sounds: Normal heart sounds  Pulmonary:      Effort: Pulmonary effort is normal  No respiratory distress  Breath sounds: Normal breath sounds  No stridor  No wheezing, rhonchi or rales  Abdominal:      Palpations: Abdomen is soft  Tenderness: There is abdominal tenderness  Musculoskeletal:         General: Normal range of motion  Cervical back: Normal range of motion  Right lower leg: No edema  Left lower leg: No edema  Skin:     General: Skin is warm  Neurological:      General: No focal deficit present  Mental Status: She is alert and oriented to person, place, and time  Psychiatric:         Mood and Affect: Mood normal          Behavior: Behavior normal          BMI Counseling: Body mass index is 27 62 kg/m²  The BMI is above normal  Nutrition recommendations include 3-5 servings of fruits/vegetables daily  Exercise recommendations include exercising 3-5 times per week  Depression Screening Follow-up Plan: Patient's depression screening was positive with a PHQ-2 score of 2  Their PHQ-9 score was   Continue regular follow-up with their psychologist/therapist/psychiatrist who is managing their mental health condition(s)

## 2023-05-16 NOTE — PROGRESS NOTES
"Assessment/Plan:   Diagnoses and all orders for this visit:    Annual physical exam  - reviewed PMHx, PSHx, meds, allergies, FHx, Soc Hx and Sexual Hx   - UTD with DTaP   - UTD with COVID IMMs but no Booster   - advised HPV series - pt to think about it   - reviewed labs done  - declined STD screening in the office today   - follows with [facility] - s/p miscarriage 2023   - discussed diet and exercise   - UTD with Optho and Dental   - RTO in 1yr for annual or as needed - pt aware and agreeable     Other fatigue  Acute viral pharyngitis  -     Mononucleosis screen; Future  -     EBV acute panel; Future  - BF's daughter tested POS for Strep   - pt has tested NEG  - has been feeling unwell for the past week   - (+) sore throat, diminished appetite   - per UC note from 5/15 - \"Presents with symptoms and examination concerning for possible mono  She tested negative for strep and had a negative throat culture last week  Patient has mild tenderness to deep palpation of the left upper quadrant with slight splenomegaly consistent with mononucleosis  Instructed patient that she will need to follow-up with her family doctor tomorrow mono testing as we are not able to perform Monospot or EBV panels in our clinic\"  - testing ordered and educational handout re: MONO given to pt  - discussed treatment options - OTC Tylenol/Motrin, rest and hydration     Spontaneous   Depression, unspecified depression type  - does have a Therapist - last OV was in the past month   - in the process of switching given insurance changes - referred to [employer]   - recently had a spontaneous  - BF supportive but family not     Vitamin D insufficiency  -     Vitamin D 25 hydroxy; Future          Subjective:    Patient ID: Myranda Ram is a 20 y o  female    HPI   Myranda Ram is a 23 y o  female who presents to the office for an annual exam  1) Annual   - PMHx: migraines  - allergies: NKDA  - " "Meds: none   - PSHx: wisdom teeth extraction   - FHx: PGF (DM, Cancer), denies FHx of cervical, colon, ovarian, uterine ca or sudden cardiac death  - Immunizations: DTaP , COVID IMMs but no Booster   - GYN Hx: [facility] - s/p miscarriage 2023   - Hm: EGD/Cscope 3/2020 - Dr Aldo Jackson - gastritis   - diet/exercise: exercise: GYM, diet: \"anything and everything\"   - social: denies tob/illicts, drinks 1x/week    - sexual Hx: sexually active with BF, declined STD screening   - last vision: wears glasses PRN   - last dental: has Invisalin   2) Depression/PTSD  - does have a Therapist - last OV was in the past month   - in the process of switching given insurance changes   - recently had a spontaneous  - BF supportive but family not   3) sick s/s   - BF's daughter tested POS for Strep   - pt has tested NEG  - has been feeling unwell for the past week   - denies F/C/N/V/CP/palpitations/SOB/wheezing/abd pain  - (+) sore throat, diminished appetite   - per UC note from 5/15 - \"Presents with symptoms and examination concerning for possible mono  She tested negative for strep and had a negative throat culture last week  Patient has mild tenderness to deep palpation of the left upper quadrant with slight splenomegaly consistent with mononucleosis  Instructed patient that she will need to follow-up with her family doctor tomorrow mono testing as we are not able to perform Monospot or EBV panels in our clinic\"      The following portions of the patient's history were reviewed and updated as appropriate: allergies, current medications, past family history, past medical history, past social history, past surgical history and problem list     Review of Systems  as per HPI    Objective:  /70   Pulse 98   Ht 5' 2\" (1 575 m)   Wt 68 5 kg (151 lb)   SpO2 97%   BMI 27 62 kg/m²    Physical Exam  Vitals reviewed  Constitutional:       General: She is not in acute distress       Appearance: Normal " appearance  She is not ill-appearing, toxic-appearing or diaphoretic  HENT:      Head: Normocephalic and atraumatic  Right Ear: Tympanic membrane, ear canal and external ear normal  There is no impacted cerumen  Left Ear: Tympanic membrane, ear canal and external ear normal  There is no impacted cerumen  Nose: Nose normal       Mouth/Throat:      Mouth: Mucous membranes are moist       Pharynx: Oropharynx is clear  No oropharyngeal exudate or posterior oropharyngeal erythema  Eyes:      General: No scleral icterus  Right eye: No discharge  Left eye: No discharge  Extraocular Movements: Extraocular movements intact  Conjunctiva/sclera: Conjunctivae normal    Cardiovascular:      Rate and Rhythm: Normal rate and regular rhythm  Heart sounds: Normal heart sounds  Pulmonary:      Effort: Pulmonary effort is normal  No respiratory distress  Breath sounds: Normal breath sounds  No stridor  No wheezing, rhonchi or rales  Abdominal:      Palpations: Abdomen is soft  Tenderness: There is abdominal tenderness  Musculoskeletal:         General: Normal range of motion  Cervical back: Normal range of motion  Right lower leg: No edema  Left lower leg: No edema  Skin:     General: Skin is warm  Neurological:      General: No focal deficit present  Mental Status: She is alert and oriented to person, place, and time  Psychiatric:         Mood and Affect: Mood normal          Behavior: Behavior normal          BMI Counseling: Body mass index is 27 62 kg/m²  The BMI is above normal  Nutrition recommendations include 3-5 servings of fruits/vegetables daily  Exercise recommendations include exercising 3-5 times per week  Depression Screening Follow-up Plan: Patient's depression screening was positive with a PHQ-2 score of 2  Their PHQ-9 score was    Continue regular follow-up with their psychologist/therapist/psychiatrist who is managing their mental health condition(s)

## 2023-05-17 LAB
EBV NA IGG SER IA-ACNC: >600 U/ML (ref 0–17.9)
EBV VCA IGG SER IA-ACNC: >600 U/ML (ref 0–17.9)
EBV VCA IGM SER IA-ACNC: <36 U/ML (ref 0–35.9)
HETEROPH AB SER QL: NEGATIVE
INTERPRETATION: ABNORMAL

## 2023-05-18 ENCOUNTER — TELEPHONE (OUTPATIENT)
Dept: FAMILY MEDICINE CLINIC | Facility: CLINIC | Age: 23
End: 2023-05-18

## 2023-05-18 NOTE — TELEPHONE ENCOUNTER
----- Message from Cyndee Astudillo DO sent at 5/17/2023  5:56 PM EDT -----  Monospot NEG   EBV panel indicates a PAST infection - not current one  Cont with OTC Tylenol/Motrin, rest, hydration

## 2023-08-11 ENCOUNTER — TELEPHONE (OUTPATIENT)
Dept: OBGYN CLINIC | Facility: CLINIC | Age: 23
End: 2023-08-11

## 2023-08-11 DIAGNOSIS — Z3A.01 LESS THAN 8 WEEKS GESTATION OF PREGNANCY: Primary | ICD-10-CM

## 2023-08-11 NOTE — TELEPHONE ENCOUNTER
Pt called because she recently found out she was pregnant. She took a pos. preg test today. Her last lmp was 7/5. She had an appointment on 8/17 for a follow up appointment but new ob could not be done at that time, scheduled for another time. Patient also sent to complete blood work since she had a recent loss in May. Patient will complete at lab. Patient aware to give us a call if any questions or concerns before appointment.

## 2023-08-13 ENCOUNTER — APPOINTMENT (OUTPATIENT)
Dept: LAB | Facility: CLINIC | Age: 23
End: 2023-08-13
Payer: COMMERCIAL

## 2023-08-13 DIAGNOSIS — Z3A.01 LESS THAN 8 WEEKS GESTATION OF PREGNANCY: ICD-10-CM

## 2023-08-13 LAB
25(OH)D3 SERPL-MCNC: 25.9 NG/ML (ref 30–100)
B-HCG SERPL-ACNC: 43 MIU/ML (ref 0–5)
PROGEST SERPL-MCNC: 16.78 NG/ML

## 2023-08-13 PROCEDURE — 36415 COLL VENOUS BLD VENIPUNCTURE: CPT

## 2023-08-13 PROCEDURE — 84144 ASSAY OF PROGESTERONE: CPT

## 2023-08-13 PROCEDURE — 84702 CHORIONIC GONADOTROPIN TEST: CPT

## 2023-08-15 ENCOUNTER — APPOINTMENT (OUTPATIENT)
Dept: LAB | Facility: CLINIC | Age: 23
End: 2023-08-15
Payer: COMMERCIAL

## 2023-08-15 ENCOUNTER — OFFICE VISIT (OUTPATIENT)
Dept: FAMILY MEDICINE CLINIC | Facility: CLINIC | Age: 23
End: 2023-08-15
Payer: COMMERCIAL

## 2023-08-15 VITALS
BODY MASS INDEX: 27.97 KG/M2 | WEIGHT: 152 LBS | HEIGHT: 62 IN | RESPIRATION RATE: 16 BRPM | OXYGEN SATURATION: 98 % | SYSTOLIC BLOOD PRESSURE: 110 MMHG | HEART RATE: 91 BPM | DIASTOLIC BLOOD PRESSURE: 68 MMHG

## 2023-08-15 DIAGNOSIS — E05.90 SUBCLINICAL HYPERTHYROIDISM: ICD-10-CM

## 2023-08-15 DIAGNOSIS — M54.50 ACUTE BILATERAL LOW BACK PAIN WITHOUT SCIATICA: ICD-10-CM

## 2023-08-15 DIAGNOSIS — E55.9 VITAMIN D INSUFFICIENCY: ICD-10-CM

## 2023-08-15 DIAGNOSIS — Z3A.01 LESS THAN 8 WEEKS GESTATION OF PREGNANCY: Primary | ICD-10-CM

## 2023-08-15 DIAGNOSIS — Z3A.01 LESS THAN 8 WEEKS GESTATION OF PREGNANCY: ICD-10-CM

## 2023-08-15 DIAGNOSIS — F32.A DEPRESSION, UNSPECIFIED DEPRESSION TYPE: ICD-10-CM

## 2023-08-15 LAB — B-HCG SERPL-ACNC: 129 MIU/ML (ref 0–5)

## 2023-08-15 PROCEDURE — 36415 COLL VENOUS BLD VENIPUNCTURE: CPT

## 2023-08-15 PROCEDURE — 84702 CHORIONIC GONADOTROPIN TEST: CPT

## 2023-08-15 PROCEDURE — 99214 OFFICE O/P EST MOD 30 MIN: CPT | Performed by: FAMILY MEDICINE

## 2023-08-15 NOTE — PROGRESS NOTES
Assessment/Plan:   Diagnoses and all orders for this visit:    Less than 8 weeks gestation of pregnancy  - FDLMP = 7/5/2023   - pt with increase hCG levels   - Ob/Gyn on board given recent loss in 5/2023   - has appt with Ob/Gyn on 9/13/2023   - management as per Ob/Gyn     Subclinical hyperthyroidism  -     TSH, 3rd generation with Free T4 reflex  - was noted to have abnml TSH on labs done 5/2023 - "subclinical hyperthyroid" but no follow-up   - denies FHx of thyroid dx   - will repeat labs     Vitamin D insufficiency  - cont OTC Vit D supplements     Acute bilateral low back pain without sciatica  -     Ambulatory Referral to Physical Therapy; Future  - per pt with postural low back pain   - given recent early pregnancy - caution with OTC pain meds   - referred to PT   - f/u PRN - pt aware and agreeable     Depression, unspecified depression type  - advised continued f/u with Therapist             Subjective:    Patient ID: Conchis Foote is a 24 y.o. female. HPI   18yo F presents to the office with her BF for f/u   - FDLMP = 7/5/2023 - currently pregnant with desired pregnancy   - had repeat hCG done this AM as per Ob/Gyn   - has an appt with Ob/Gyn on 9/13/2023   - has stopped taking medications for anxiety/depression   - was noted to have abnml TSH on labs done 5/2023 - "subclinical hyperthyroid" but no follow-up   - denies FHx of thyroid dx   - (+) postural low back pain       The following portions of the patient's history were reviewed and updated as appropriate: allergies, current medications, past family history, past medical history, past social history, past surgical history and problem list.    Review of Systems  as per HPI    Objective:  /68   Pulse 91   Resp 16   Ht 5' 2" (1.575 m)   Wt 68.9 kg (152 lb)   LMP 07/05/2023 (Exact Date)   SpO2 98%   BMI 27.80 kg/m²    Physical Exam  Vitals reviewed. Constitutional:       General: She is not in acute distress.      Appearance: Normal appearance. She is not ill-appearing, toxic-appearing or diaphoretic. HENT:      Head: Normocephalic and atraumatic. Right Ear: External ear normal.      Left Ear: External ear normal.      Nose: Nose normal.   Eyes:      General: No scleral icterus. Right eye: No discharge. Left eye: No discharge. Extraocular Movements: Extraocular movements intact. Conjunctiva/sclera: Conjunctivae normal.   Pulmonary:      Effort: Pulmonary effort is normal.   Musculoskeletal:         General: No tenderness or signs of injury. Normal range of motion. Cervical back: Normal range of motion. Right lower leg: No edema. Left lower leg: No edema. Neurological:      General: No focal deficit present. Mental Status: She is alert and oriented to person, place, and time. BMI Counseling: Body mass index is 27.8 kg/m². The BMI is above normal. Nutrition recommendations include 3-5 servings of fruits/vegetables daily. Exercise recommendations include exercising 3-5 times per week.

## 2023-08-16 ENCOUNTER — TELEPHONE (OUTPATIENT)
Dept: OBGYN CLINIC | Facility: CLINIC | Age: 23
End: 2023-08-16

## 2023-08-16 DIAGNOSIS — Z3A.01 LESS THAN 8 WEEKS GESTATION OF PREGNANCY: Primary | ICD-10-CM

## 2023-08-16 NOTE — TELEPHONE ENCOUNTER
Patient called, left message on answering machine, requesting HCG and progestogen labs as she has a hx of miscarriage in the past and it would be peace of mind for her until shes seen in office.

## 2023-08-20 ENCOUNTER — APPOINTMENT (OUTPATIENT)
Dept: LAB | Facility: CLINIC | Age: 23
End: 2023-08-20
Payer: COMMERCIAL

## 2023-08-20 DIAGNOSIS — Z3A.01 LESS THAN 8 WEEKS GESTATION OF PREGNANCY: ICD-10-CM

## 2023-08-20 LAB
B-HCG SERPL-ACNC: 2180 MIU/ML (ref 0–5)
PROGEST SERPL-MCNC: 17.18 NG/ML
TSH SERPL DL<=0.05 MIU/L-ACNC: 0.55 UIU/ML (ref 0.45–4.5)

## 2023-08-20 PROCEDURE — 84144 ASSAY OF PROGESTERONE: CPT

## 2023-08-20 PROCEDURE — 84702 CHORIONIC GONADOTROPIN TEST: CPT

## 2023-08-20 PROCEDURE — 36415 COLL VENOUS BLD VENIPUNCTURE: CPT

## 2023-08-20 PROCEDURE — 84443 ASSAY THYROID STIM HORMONE: CPT | Performed by: FAMILY MEDICINE

## 2023-08-21 ENCOUNTER — TELEPHONE (OUTPATIENT)
Dept: OBGYN CLINIC | Facility: CLINIC | Age: 23
End: 2023-08-21

## 2023-08-21 DIAGNOSIS — Z3A.01 LESS THAN 8 WEEKS GESTATION OF PREGNANCY: Primary | ICD-10-CM

## 2023-08-21 NOTE — TELEPHONE ENCOUNTER
Patient was told to have a repeat hcg test and needs another order. She is going tomorrow morning 8/22/23.

## 2023-08-22 ENCOUNTER — TELEPHONE (OUTPATIENT)
Dept: FAMILY MEDICINE CLINIC | Facility: CLINIC | Age: 23
End: 2023-08-22

## 2023-08-22 ENCOUNTER — APPOINTMENT (OUTPATIENT)
Dept: LAB | Facility: CLINIC | Age: 23
End: 2023-08-22
Payer: COMMERCIAL

## 2023-08-22 DIAGNOSIS — Z3A.01 LESS THAN 8 WEEKS GESTATION OF PREGNANCY: ICD-10-CM

## 2023-08-22 LAB — B-HCG SERPL-ACNC: 5345 MIU/ML (ref 0–5)

## 2023-08-22 PROCEDURE — 36415 COLL VENOUS BLD VENIPUNCTURE: CPT

## 2023-08-22 PROCEDURE — 84702 CHORIONIC GONADOTROPIN TEST: CPT

## 2023-08-24 ENCOUNTER — VBI (OUTPATIENT)
Dept: ADMINISTRATIVE | Facility: OTHER | Age: 23
End: 2023-08-24

## 2023-08-29 ENCOUNTER — TELEPHONE (OUTPATIENT)
Dept: OBGYN CLINIC | Facility: CLINIC | Age: 23
End: 2023-08-29

## 2023-08-29 NOTE — TELEPHONE ENCOUNTER
Placed call to patient - 7.6 weeks. noticed light pink spotting with wiping last night post intercourse, no spotting today. No active bleeding. No clots passed. No abdominal pain, minor cramping last night but no cramping today. No discharge. Advised patient minimal spotting can occur post intercourse if cervix was irritated. Patient has a hx of miscarriage. Last HCG 8/. NOB appt 9/13. Spoke to Dr. Elio Fallon - given bleeding has resolved and post intercourse I agree with you to monitor- if any bleeding recurs- increasing cramping have her call back and we will send for ultrasound. Also recommended to hold off on intercourse until she has her u/s. Patient made aware and agreed.

## 2023-08-29 NOTE — TELEPHONE ENCOUNTER
Patient called, left message on answering machine, last night had some light pink spotting. HX of miscarriages. Unsure if HCG or progesterone needs to be checked.

## 2023-09-06 ENCOUNTER — HOSPITAL ENCOUNTER (EMERGENCY)
Facility: HOSPITAL | Age: 23
Discharge: HOME | End: 2023-09-06
Attending: EMERGENCY MEDICINE
Payer: COMMERCIAL

## 2023-09-06 ENCOUNTER — APPOINTMENT (EMERGENCY)
Dept: ULTRASOUND IMAGING | Facility: HOSPITAL | Age: 23
End: 2023-09-06
Payer: COMMERCIAL

## 2023-09-06 ENCOUNTER — TELEPHONE (OUTPATIENT)
Dept: OBGYN CLINIC | Facility: CLINIC | Age: 23
End: 2023-09-06

## 2023-09-06 VITALS
OXYGEN SATURATION: 100 % | RESPIRATION RATE: 18 BRPM | TEMPERATURE: 98.2 F | HEART RATE: 95 BPM | DIASTOLIC BLOOD PRESSURE: 56 MMHG | SYSTOLIC BLOOD PRESSURE: 117 MMHG

## 2023-09-06 DIAGNOSIS — O20.9 BLEEDING IN EARLY PREGNANCY: Primary | ICD-10-CM

## 2023-09-06 DIAGNOSIS — R10.31 BILATERAL LOWER ABDOMINAL CRAMPING: ICD-10-CM

## 2023-09-06 DIAGNOSIS — O46.90 VAGINAL BLEEDING DURING PREGNANCY: Primary | ICD-10-CM

## 2023-09-06 DIAGNOSIS — R10.32 BILATERAL LOWER ABDOMINAL CRAMPING: ICD-10-CM

## 2023-09-06 LAB
ABO GROUP BLD: NORMAL
ALBUMIN SERPL BCP-MCNC: 4.3 G/DL (ref 3.5–5)
ALP SERPL-CCNC: 61 U/L (ref 34–104)
ALT SERPL W P-5'-P-CCNC: 10 U/L (ref 7–52)
ANION GAP SERPL CALCULATED.3IONS-SCNC: 6 MMOL/L
APTT PPP: 27 SECONDS (ref 23–37)
AST SERPL W P-5'-P-CCNC: 15 U/L (ref 13–39)
B-HCG SERPL-ACNC: ABNORMAL MIU/ML (ref 0–5)
BACTERIA UR QL AUTO: ABNORMAL /HPF
BASOPHILS # BLD AUTO: 0.02 THOUSANDS/ÂΜL (ref 0–0.1)
BASOPHILS NFR BLD AUTO: 0 % (ref 0–1)
BILIRUB SERPL-MCNC: 0.29 MG/DL (ref 0.2–1)
BILIRUB UR QL STRIP: NEGATIVE
BLD GP AB SCN SERPL QL: NEGATIVE
BUN SERPL-MCNC: 8 MG/DL (ref 5–25)
CALCIUM SERPL-MCNC: 9.2 MG/DL (ref 8.4–10.2)
CHLORIDE SERPL-SCNC: 102 MMOL/L (ref 96–108)
CLARITY UR: CLEAR
CO2 SERPL-SCNC: 26 MMOL/L (ref 21–32)
COLOR UR: ABNORMAL
CREAT SERPL-MCNC: 0.67 MG/DL (ref 0.6–1.3)
EOSINOPHIL # BLD AUTO: 0.13 THOUSAND/ÂΜL (ref 0–0.61)
EOSINOPHIL NFR BLD AUTO: 2 % (ref 0–6)
ERYTHROCYTE [DISTWIDTH] IN BLOOD BY AUTOMATED COUNT: 11.9 % (ref 11.6–15.1)
EXT PREGNANCY TEST URINE: POSITIVE
EXT. CONTROL: ABNORMAL
GFR SERPL CREATININE-BSD FRML MDRD: 123 ML/MIN/1.73SQ M
GLUCOSE SERPL-MCNC: 95 MG/DL (ref 65–140)
GLUCOSE UR STRIP-MCNC: NEGATIVE MG/DL
HCT VFR BLD AUTO: 37.2 % (ref 34.8–46.1)
HGB BLD-MCNC: 12.2 G/DL (ref 11.5–15.4)
HGB UR QL STRIP.AUTO: ABNORMAL
IMM GRANULOCYTES # BLD AUTO: 0.02 THOUSAND/UL (ref 0–0.2)
IMM GRANULOCYTES NFR BLD AUTO: 0 % (ref 0–2)
INR PPP: 0.95 (ref 0.84–1.19)
KETONES UR STRIP-MCNC: NEGATIVE MG/DL
LEUKOCYTE ESTERASE UR QL STRIP: NEGATIVE
LIPASE SERPL-CCNC: 16 U/L (ref 11–82)
LYMPHOCYTES # BLD AUTO: 2.42 THOUSANDS/ÂΜL (ref 0.6–4.47)
LYMPHOCYTES NFR BLD AUTO: 33 % (ref 14–44)
MAGNESIUM SERPL-MCNC: 1.9 MG/DL (ref 1.9–2.7)
MCH RBC QN AUTO: 28.3 PG (ref 26.8–34.3)
MCHC RBC AUTO-ENTMCNC: 32.8 G/DL (ref 31.4–37.4)
MCV RBC AUTO: 86 FL (ref 82–98)
MONOCYTES # BLD AUTO: 0.7 THOUSAND/ÂΜL (ref 0.17–1.22)
MONOCYTES NFR BLD AUTO: 10 % (ref 4–12)
MUCOUS THREADS UR QL AUTO: ABNORMAL
NEUTROPHILS # BLD AUTO: 3.99 THOUSANDS/ÂΜL (ref 1.85–7.62)
NEUTS SEG NFR BLD AUTO: 55 % (ref 43–75)
NITRITE UR QL STRIP: NEGATIVE
NON-SQ EPI CELLS URNS QL MICRO: ABNORMAL /HPF
NRBC BLD AUTO-RTO: 0 /100 WBCS
PH UR STRIP.AUTO: 6 [PH]
PLATELET # BLD AUTO: 229 THOUSANDS/UL (ref 149–390)
PMV BLD AUTO: 10 FL (ref 8.9–12.7)
POTASSIUM SERPL-SCNC: 4 MMOL/L (ref 3.5–5.3)
PROT SERPL-MCNC: 7.2 G/DL (ref 6.4–8.4)
PROT UR STRIP-MCNC: NEGATIVE MG/DL
PROTHROMBIN TIME: 13.3 SECONDS (ref 11.6–14.5)
RBC # BLD AUTO: 4.31 MILLION/UL (ref 3.81–5.12)
RBC #/AREA URNS AUTO: ABNORMAL /HPF
RH BLD: POSITIVE
SODIUM SERPL-SCNC: 134 MMOL/L (ref 135–147)
SP GR UR STRIP.AUTO: 1.02 (ref 1–1.03)
SPECIMEN EXPIRATION DATE: NORMAL
UROBILINOGEN UR STRIP-ACNC: <2 MG/DL
WBC # BLD AUTO: 7.28 THOUSAND/UL (ref 4.31–10.16)
WBC #/AREA URNS AUTO: ABNORMAL /HPF

## 2023-09-06 PROCEDURE — 81025 URINE PREGNANCY TEST: CPT | Performed by: PHYSICIAN ASSISTANT

## 2023-09-06 PROCEDURE — 36415 COLL VENOUS BLD VENIPUNCTURE: CPT | Performed by: PHYSICIAN ASSISTANT

## 2023-09-06 PROCEDURE — 96360 HYDRATION IV INFUSION INIT: CPT

## 2023-09-06 PROCEDURE — 96361 HYDRATE IV INFUSION ADD-ON: CPT

## 2023-09-06 PROCEDURE — 86901 BLOOD TYPING SEROLOGIC RH(D): CPT | Performed by: PHYSICIAN ASSISTANT

## 2023-09-06 PROCEDURE — 80053 COMPREHEN METABOLIC PANEL: CPT | Performed by: PHYSICIAN ASSISTANT

## 2023-09-06 PROCEDURE — 83690 ASSAY OF LIPASE: CPT | Performed by: PHYSICIAN ASSISTANT

## 2023-09-06 PROCEDURE — 86850 RBC ANTIBODY SCREEN: CPT | Performed by: PHYSICIAN ASSISTANT

## 2023-09-06 PROCEDURE — 81001 URINALYSIS AUTO W/SCOPE: CPT | Performed by: PHYSICIAN ASSISTANT

## 2023-09-06 PROCEDURE — 76801 OB US < 14 WKS SINGLE FETUS: CPT

## 2023-09-06 PROCEDURE — 83735 ASSAY OF MAGNESIUM: CPT | Performed by: PHYSICIAN ASSISTANT

## 2023-09-06 PROCEDURE — 86900 BLOOD TYPING SEROLOGIC ABO: CPT | Performed by: PHYSICIAN ASSISTANT

## 2023-09-06 PROCEDURE — 85730 THROMBOPLASTIN TIME PARTIAL: CPT | Performed by: PHYSICIAN ASSISTANT

## 2023-09-06 PROCEDURE — 85610 PROTHROMBIN TIME: CPT | Performed by: PHYSICIAN ASSISTANT

## 2023-09-06 PROCEDURE — 87086 URINE CULTURE/COLONY COUNT: CPT | Performed by: PHYSICIAN ASSISTANT

## 2023-09-06 PROCEDURE — 84702 CHORIONIC GONADOTROPIN TEST: CPT | Performed by: PHYSICIAN ASSISTANT

## 2023-09-06 PROCEDURE — 85025 COMPLETE CBC W/AUTO DIFF WBC: CPT | Performed by: PHYSICIAN ASSISTANT

## 2023-09-06 PROCEDURE — 99284 EMERGENCY DEPT VISIT MOD MDM: CPT

## 2023-09-06 RX ADMIN — SODIUM CHLORIDE 1000 ML: 0.9 INJECTION, SOLUTION INTRAVENOUS at 02:54

## 2023-09-06 NOTE — DISCHARGE INSTRUCTIONS
Karo Lopez MD   [phone] 9/6/2023     Narrative & Impression   FIRST TRIMESTER OBSTETRIC ULTRASOUND, COMPLETE       INDICATION: . Spotting   LMP: Unknown       COMPARISON: Ultrasound 5/1/2023       TECHNIQUE:   Transabdominal ultrasound of the pelvis was performed. Additional transvaginal imaging was then performed to better assess the gestation, myometrial/endometrial architecture and ovarian parenchymal detail. The study includes volumetric   sweeps and traditional still imaging technique. FINDINGS:       A single live intrauterine gestation is identified. Cardiac activity is present. Heart rate of 144 bpm.       YOLK SAC:  Present and normal in size and appearance. MEAN GESTATIONAL SAC SIZE: 20.0 mm = 6w 3d   MEAN CROWN RUMP LENGTH: 9.0 mm = 6w 6d   FETAL ANATOMY:  Appropriate for gestational age. AMNIOTIC FLUID/SAC SHAPE:  Within expected normal range. PLACENTA:  The placenta can not be adequately assessed at this early gestational age. There is no significant subchorionic   fluid collection. UTERUS/ADNEXA:   The uterus and ovaries are within normal limits. Left corpus luteum. The cervix remains closed. No free fluid present. IMPRESSION:       1. Single live intrauterine gestation at 6 weeks 6 days by crown-rump length   2.   VINNY of 04/25/2024       Workstation performed: [id number]     Interpreted by Emiliano Lagos PA-C on 9/6/2023  4:15 AM

## 2023-09-06 NOTE — TELEPHONE ENCOUNTER
Patient called, was in the ED this AM, d/c at 0453. Patient was seen for threatened , passing small clots and tissue, vaginal bleeding. Patient does have NOB appointment scheduled on 23. Patient will go for repeat HCG on Friday AM.Patient made aware to call the office if bleeding becomes heavy, passing large clots, and/or having abdominal pain.

## 2023-09-07 LAB — BACTERIA UR CULT: NORMAL

## 2023-09-08 ENCOUNTER — APPOINTMENT (OUTPATIENT)
Dept: LAB | Facility: CLINIC | Age: 23
End: 2023-09-08
Payer: COMMERCIAL

## 2023-09-08 ENCOUNTER — TELEPHONE (OUTPATIENT)
Dept: OBGYN CLINIC | Facility: CLINIC | Age: 23
End: 2023-09-08

## 2023-09-08 DIAGNOSIS — O20.9 BLEEDING IN EARLY PREGNANCY: ICD-10-CM

## 2023-09-08 LAB — B-HCG SERPL-ACNC: ABNORMAL MIU/ML (ref 0–5)

## 2023-09-08 PROCEDURE — 36415 COLL VENOUS BLD VENIPUNCTURE: CPT

## 2023-09-08 PROCEDURE — 84702 CHORIONIC GONADOTROPIN TEST: CPT

## 2023-09-08 NOTE — TELEPHONE ENCOUNTER
Patient left message on machine - got her results, was told to call to see what next steps are. Not sure how it works, but before hcg was being watched to make sure was doubling. This last time didn't necessarily double but did go up, wanted to review.

## 2023-09-08 NOTE — TELEPHONE ENCOUNTER
Patient aware to keep initial prenatal appt as previously scheduled 9/13/23 and will have ultrasound performed at that time. Pt states she is still experiencing some spotting that she has been for the past few days and was seen in ED for 9/6/23. Pt states spotting is a little less, has not increased, is light brown in color and notices with wiping. Is wearing a liner and only has to change once a day, some spots noted on pad but not filling pad. Pt denies cramping or pain. Pt verbalizes understanding if increased bleeding or changes in color, cramping/pain or any other questions or concerns prior to scheduled appt to call office.

## 2023-09-11 NOTE — PROGRESS NOTES
Pt presents for initial OB appointment. Pt seen in ED 23 with VB. US done showed:  Single live intrauterine gestation at 6 weeks 6 days by crown-rump length   2.  VINNY of 2024   VB has since stopped as of last Friday. Pap, PE and cx's to be done at next visit. MRSA:denies   Varicella: had vaccine  STD history: denies   Drug/alcohol use history: denies  Slip written for initial OB panel  Genetic testing: will need carrier screening as well as review of testing options for baby  US done in office today: TVUS CRL c/w 8w1d +  bpm. Size , d by approx 2 weeks, but c/w size based on previous scan. Will adjust VINNY to date by initial scan of 24      PMHX:vitamin D def, migraine w aura    POBHX:    PSURGHX:wisdom teeth      All questions answered. Ultrasound Probe Disinfection    A transvaginal ultrasound was performed.    Probe identification: probe serial number [facility]: [id number]  Disinfection process: Disinfection was performed with High Level Disinfection Process (Trophon)    Virginia Guadalupe PA-C

## 2023-09-13 ENCOUNTER — INITIAL PRENATAL (OUTPATIENT)
Dept: OBGYN CLINIC | Facility: CLINIC | Age: 23
End: 2023-09-13
Payer: COMMERCIAL

## 2023-09-13 VITALS
SYSTOLIC BLOOD PRESSURE: 110 MMHG | BODY MASS INDEX: 28.12 KG/M2 | HEIGHT: 62 IN | DIASTOLIC BLOOD PRESSURE: 66 MMHG | WEIGHT: 152.8 LBS

## 2023-09-13 DIAGNOSIS — Z34.81 MULTIGRAVIDA IN FIRST TRIMESTER: Primary | ICD-10-CM

## 2023-09-13 PROCEDURE — T1001 NURSING ASSESSMENT/EVALUATN: HCPCS | Performed by: PHYSICIAN ASSISTANT

## 2023-09-13 PROCEDURE — 76817 TRANSVAGINAL US OBSTETRIC: CPT | Performed by: PHYSICIAN ASSISTANT

## 2023-09-13 NOTE — PROGRESS NOTES
Patient present for her NOB education class. Patient was accompanied by her significant other. Reviewed expected appointments and patient stated she understood. Reviewed new OB packet folder and contents of the [facility] Pregnancy Essential guide. Answered all of patients questions and concerns.

## 2023-09-21 ENCOUNTER — TELEPHONE (OUTPATIENT)
Dept: OBGYN CLINIC | Facility: CLINIC | Age: 23
End: 2023-09-21

## 2023-09-29 ENCOUNTER — APPOINTMENT (OUTPATIENT)
Dept: LAB | Facility: CLINIC | Age: 23
End: 2023-09-29
Payer: COMMERCIAL

## 2023-09-29 ENCOUNTER — TELEPHONE (OUTPATIENT)
Dept: OBGYN CLINIC | Facility: CLINIC | Age: 23
End: 2023-09-29

## 2023-09-29 ENCOUNTER — APPOINTMENT (OUTPATIENT)
Dept: LAB | Facility: HOSPITAL | Age: 23
End: 2023-09-29
Payer: COMMERCIAL

## 2023-09-29 DIAGNOSIS — Z34.81 MULTIGRAVIDA IN FIRST TRIMESTER: ICD-10-CM

## 2023-09-29 LAB
ABO GROUP BLD: NORMAL
BACTERIA UR QL AUTO: ABNORMAL /HPF
BASOPHILS # BLD AUTO: 0.02 THOUSANDS/ÂΜL (ref 0–0.1)
BASOPHILS NFR BLD AUTO: 0 % (ref 0–1)
BILIRUB UR QL STRIP: NEGATIVE
BLD GP AB SCN SERPL QL: NEGATIVE
CLARITY UR: CLEAR
COLOR UR: YELLOW
EOSINOPHIL # BLD AUTO: 0.1 THOUSAND/ÂΜL (ref 0–0.61)
EOSINOPHIL NFR BLD AUTO: 2 % (ref 0–6)
ERYTHROCYTE [DISTWIDTH] IN BLOOD BY AUTOMATED COUNT: 12 % (ref 11.6–15.1)
GLUCOSE UR STRIP-MCNC: NEGATIVE MG/DL
HBV SURFACE AG SER QL: NORMAL
HCT VFR BLD AUTO: 35.4 % (ref 34.8–46.1)
HCV AB SER QL: NORMAL
HGB BLD-MCNC: 11.7 G/DL (ref 11.5–15.4)
HGB UR QL STRIP.AUTO: NEGATIVE
HIV 1+2 AB+HIV1 P24 AG SERPL QL IA: NORMAL
HIV 2 AB SERPL QL IA: NORMAL
HIV1 AB SERPL QL IA: NORMAL
HIV1 P24 AG SERPL QL IA: NORMAL
IMM GRANULOCYTES # BLD AUTO: 0.01 THOUSAND/UL (ref 0–0.2)
IMM GRANULOCYTES NFR BLD AUTO: 0 % (ref 0–2)
KETONES UR STRIP-MCNC: NEGATIVE MG/DL
LEUKOCYTE ESTERASE UR QL STRIP: NEGATIVE
LYMPHOCYTES # BLD AUTO: 2.21 THOUSANDS/ÂΜL (ref 0.6–4.47)
LYMPHOCYTES NFR BLD AUTO: 37 % (ref 14–44)
MCH RBC QN AUTO: 28.5 PG (ref 26.8–34.3)
MCHC RBC AUTO-ENTMCNC: 33.1 G/DL (ref 31.4–37.4)
MCV RBC AUTO: 86 FL (ref 82–98)
MONOCYTES # BLD AUTO: 0.51 THOUSAND/ÂΜL (ref 0.17–1.22)
MONOCYTES NFR BLD AUTO: 9 % (ref 4–12)
MUCOUS THREADS UR QL AUTO: ABNORMAL
NEUTROPHILS # BLD AUTO: 3.16 THOUSANDS/ÂΜL (ref 1.85–7.62)
NEUTS SEG NFR BLD AUTO: 52 % (ref 43–75)
NITRITE UR QL STRIP: NEGATIVE
NON-SQ EPI CELLS URNS QL MICRO: ABNORMAL /HPF
NRBC BLD AUTO-RTO: 0 /100 WBCS
PH UR STRIP.AUTO: 6.5 [PH]
PLATELET # BLD AUTO: 211 THOUSANDS/UL (ref 149–390)
PMV BLD AUTO: 10.2 FL (ref 8.9–12.7)
PROT UR STRIP-MCNC: ABNORMAL MG/DL
RBC # BLD AUTO: 4.11 MILLION/UL (ref 3.81–5.12)
RBC #/AREA URNS AUTO: ABNORMAL /HPF
RH BLD: POSITIVE
RUBV IGG SERPL IA-ACNC: 35.3 IU/ML
SP GR UR STRIP.AUTO: 1.02 (ref 1–1.03)
SPECIMEN EXPIRATION DATE: NORMAL
TREPONEMA PALLIDUM IGG+IGM AB [PRESENCE] IN SERUM OR PLASMA BY IMMUNOASSAY: NORMAL
UROBILINOGEN UR STRIP-ACNC: <2 MG/DL
VZV IGG SER QL IA: ABNORMAL
WBC # BLD AUTO: 6.01 THOUSAND/UL (ref 4.31–10.16)
WBC #/AREA URNS AUTO: ABNORMAL /HPF

## 2023-09-29 PROCEDURE — 86850 RBC ANTIBODY SCREEN: CPT

## 2023-09-29 PROCEDURE — 86787 VARICELLA-ZOSTER ANTIBODY: CPT

## 2023-09-29 PROCEDURE — 86900 BLOOD TYPING SEROLOGIC ABO: CPT

## 2023-09-29 PROCEDURE — 81220 CFTR GENE COM VARIANTS: CPT

## 2023-09-29 PROCEDURE — 86780 TREPONEMA PALLIDUM: CPT

## 2023-09-29 PROCEDURE — 86762 RUBELLA ANTIBODY: CPT

## 2023-09-29 PROCEDURE — 87340 HEPATITIS B SURFACE AG IA: CPT

## 2023-09-29 PROCEDURE — 36415 COLL VENOUS BLD VENIPUNCTURE: CPT

## 2023-09-29 PROCEDURE — 81329 SMN1 GENE DOS/DELETION ALYS: CPT

## 2023-09-29 PROCEDURE — 86803 HEPATITIS C AB TEST: CPT

## 2023-09-29 PROCEDURE — 85025 COMPLETE CBC W/AUTO DIFF WBC: CPT

## 2023-09-29 PROCEDURE — 86901 BLOOD TYPING SEROLOGIC RH(D): CPT

## 2023-09-29 PROCEDURE — 83020 HEMOGLOBIN ELECTROPHORESIS: CPT

## 2023-09-29 PROCEDURE — 87086 URINE CULTURE/COLONY COUNT: CPT

## 2023-09-29 PROCEDURE — 87389 HIV-1 AG W/HIV-1&-2 AB AG IA: CPT

## 2023-09-29 NOTE — TELEPHONE ENCOUNTER
Called and spoke to patient. Patient reports cramping last night was mid lower abdomen/pelvic area, denies any further cramping noticed today, states she is unsure if possible gas, has been a little constipated per patient. Last BM 9/27/23, patient has list of medications safe to take in pregnancy as provided in blue folder at initial prenatal appt, reviewed with patient OTC recommendations safe in pregnancy for constipation, patient verbalizes understanding of maintaining bowel regimen to avoid constipation/discomfort during pregnancy. Patient verbalizes understanding for hydration, tylenol as needed and if cramping continues or worsens to call office. Patient aware fatigue may lessen closer to second trimester and may experience breast changes throughout pregnancy. Patient verbalizes understanding and reassurance, has no further questions at this time.

## 2023-09-29 NOTE — TELEPHONE ENCOUNTER
Patient left message on machine - has a couple questions, for peace of mind, doesn't think anything is wrong. Had some cramping last night, no bleeding or spotting. Cramps got a little sharp from time to time but have stopped. Symptoms are lessened, breasts aren't as sore, doesn't feel as tired. Still a little tired and symptoms still there but just not as intense. Knows as pregnancy goes along symptoms do lessen.

## 2023-09-30 LAB — BACTERIA UR CULT: NORMAL

## 2023-10-03 LAB
HGB A MFR BLD: 3 % (ref 1.8–3.2)
HGB A MFR BLD: 97 % (ref 96.4–98.8)
HGB F MFR BLD: 0 % (ref 0–2)
HGB FRACT BLD-IMP: NORMAL
HGB S MFR BLD: 0 %

## 2023-10-04 LAB
CITATION REF LAB TEST: NORMAL
CLINICAL INFO: NORMAL
ETHNIC BACKGROUND STATED: NORMAL
GENE DIS ANL CARRIER INTERP-IMP: NORMAL
GENE MUT TESTED BLD/T: NORMAL
LAB DIRECTOR NAME PROVIDER: NORMAL
REASON FOR REFERRAL (NARRATIVE): NORMAL
RECOMMENDATION PATIENT DOC-IMP: NORMAL
REF LAB TEST METHOD: NORMAL
SERVICE CMNT-IMP: NORMAL
SMN1 GENE MUT ANL BLD/T: NORMAL
SPECIMEN SOURCE: NORMAL

## 2023-10-05 LAB
CF COMMENT: NORMAL
CFTR MUT ANL BLD/T: NORMAL

## 2023-10-06 ENCOUNTER — OFFICE VISIT (OUTPATIENT)
Dept: FAMILY MEDICINE CLINIC | Facility: CLINIC | Age: 23
End: 2023-10-06
Payer: COMMERCIAL

## 2023-10-06 VITALS
HEIGHT: 62 IN | TEMPERATURE: 97.9 F | DIASTOLIC BLOOD PRESSURE: 68 MMHG | BODY MASS INDEX: 27.97 KG/M2 | WEIGHT: 152 LBS | HEART RATE: 90 BPM | SYSTOLIC BLOOD PRESSURE: 118 MMHG | OXYGEN SATURATION: 98 %

## 2023-10-06 DIAGNOSIS — B34.9 VIRAL INFECTION, UNSPECIFIED: Primary | ICD-10-CM

## 2023-10-06 PROCEDURE — 99213 OFFICE O/P EST LOW 20 MIN: CPT | Performed by: FAMILY MEDICINE

## 2023-10-06 RX ORDER — FLUTICASONE PROPIONATE 50 MCG
2 SPRAY, SUSPENSION (ML) NASAL DAILY
Qty: 16 G | Refills: 0 | Status: SHIPPED | OUTPATIENT
Start: 2023-10-06

## 2023-10-06 NOTE — PROGRESS NOTES
Subjective:      Patient ID: Brittnee Sanabria is a 24 y.o. female. 76-year-old female presents to the office complaining of 3-day history of significant nasal congestion, clear nasal discharge. No fevers or chills. No sore throat. No cough. All of her symptoms seem to be in her nose. She did take COVID-19 swab which was negative. Patient is 11 weeks pregnant and is not certain what she can safely take during pregnancy      Past Medical History:   Diagnosis Date   • Anxiety    • Depression    • Miscarriage        Family History   Problem Relation Age of Onset   • No Known Problems Mother    • No Known Problems Father    • Migraines Maternal Grandmother    • Diabetes Paternal Grandfather    • Cancer Paternal Grandfather    • Breast cancer Family 48        MGM's sister    • Cervical cancer Neg Hx    • Colon cancer Neg Hx    • Ovarian cancer Neg Hx    • Uterine cancer Neg Hx    • Sudden death Neg Hx        Past Surgical History:   Procedure Laterality Date   • WISDOM TOOTH EXTRACTION          reports that she has never smoked. She has never used smokeless tobacco. She reports that she does not currently use alcohol. She reports that she does not use drugs. Current Outpatient Medications:   •  doxylamine (UNISOM) 25 MG tablet, Take 0.5 tablets (12.5 mg total) by mouth daily at bedtime as needed for sleep, Disp: 30 tablet, Rfl: 0  •  fluticasone (FLONASE) 50 mcg/act nasal spray, 2 sprays into each nostril daily, Disp: 16 g, Rfl: 0  •  Prenatal Multivit-Min-Fe-FA (PRE- FORMULA PO), Take by mouth, Disp: , Rfl:     The following portions of the patient's history were reviewed and updated as appropriate: allergies, current medications, past family history, past medical history, past social history, past surgical history and problem list.    Review of Systems   Constitutional: Negative for activity change, appetite change, chills, diaphoresis, fatigue, fever and unexpected weight change.    HENT: Positive for congestion. Negative for ear pain, postnasal drip and rhinorrhea. Respiratory: Negative for cough, chest tightness, shortness of breath and wheezing. Cardiovascular: Negative. All other systems reviewed and are negative. Objective:    /68   Pulse 90   Temp 97.9 °F (36.6 °C)   Ht 5' 2" (1.575 m)   Wt 68.9 kg (152 lb)   LMP 07/05/2023 (Exact Date)   SpO2 98%   BMI 27.80 kg/m²      Physical Exam  Vitals and nursing note reviewed. Constitutional:       General: She is not in acute distress. Appearance: Normal appearance. She is well-developed. She is not ill-appearing. HENT:      Head: Normocephalic and atraumatic. Right Ear: Hearing, tympanic membrane, ear canal and external ear normal.      Left Ear: Hearing, tympanic membrane and external ear normal.      Nose: Mucosal edema and congestion present. No nasal deformity or septal deviation. Right Sinus: No maxillary sinus tenderness or frontal sinus tenderness. Left Sinus: No maxillary sinus tenderness or frontal sinus tenderness. Mouth/Throat:      Mouth: Mucous membranes are moist.      Pharynx: Oropharynx is clear. Cardiovascular:      Rate and Rhythm: Normal rate and regular rhythm. Pulmonary:      Effort: Pulmonary effort is normal.      Breath sounds: Normal breath sounds. Musculoskeletal:      Cervical back: Normal range of motion and neck supple. Neurological:      Mental Status: She is alert.            Recent Results (from the past 1008 hour(s))   UA w Reflex to Microscopic w Reflex to Culture    Collection Time: 09/06/23  1:58 AM    Specimen: Urine, Clean Catch   Result Value Ref Range    Color, UA Light Yellow     Clarity, UA Clear     Specific Gravity, UA 1.022 1.003 - 1.030    pH, UA 6.0 4.5, 5.0, 5.5, 6.0, 6.5, 7.0, 7.5, 8.0    Leukocytes, UA Negative Negative    Nitrite, UA Negative Negative    Protein, UA Negative Negative mg/dl    Glucose, UA Negative Negative mg/dl Ketones, UA Negative Negative mg/dl    Urobilinogen, UA <2.0 <2.0 mg/dl mg/dl    Bilirubin, UA Negative Negative    Occult Blood, UA Trace (A) Negative    URINE COMMENT     Urine Microscopic    Collection Time: 09/06/23  1:58 AM   Result Value Ref Range    RBC, UA None Seen None Seen, 1-2 /hpf    WBC, UA 1-2 None Seen, 1-2 /hpf    Epithelial Cells Occasional None Seen, Occasional /hpf    Bacteria, UA None Seen None Seen, Occasional /hpf    MUCUS THREADS Occasional (A) None Seen    URINE COMMENT     Urine culture    Collection Time: 09/06/23  1:58 AM    Specimen: Urine, Clean Catch   Result Value Ref Range    Urine Culture <10,000 cfu/ml    POCT pregnancy, urine    Collection Time: 09/06/23  2:07 AM   Result Value Ref Range    EXT Preg Test, Ur Positive (A)     Control Valid    CBC and differential    Collection Time: 09/06/23  2:55 AM   Result Value Ref Range    WBC 7.28 4.31 - 10.16 Thousand/uL    RBC 4.31 3.81 - 5.12 Million/uL    Hemoglobin 12.2 11.5 - 15.4 g/dL    Hematocrit 37.2 34.8 - 46.1 %    MCV 86 82 - 98 fL    MCH 28.3 26.8 - 34.3 pg    MCHC 32.8 31.4 - 37.4 g/dL    RDW 11.9 11.6 - 15.1 %    MPV 10.0 8.9 - 12.7 fL    Platelets 891 983 - 672 Thousands/uL    nRBC 0 /100 WBCs    Neutrophils Relative 55 43 - 75 %    Immat GRANS % 0 0 - 2 %    Lymphocytes Relative 33 14 - 44 %    Monocytes Relative 10 4 - 12 %    Eosinophils Relative 2 0 - 6 %    Basophils Relative 0 0 - 1 %    Neutrophils Absolute 3.99 1.85 - 7.62 Thousands/µL    Immature Grans Absolute 0.02 0.00 - 0.20 Thousand/uL    Lymphocytes Absolute 2.42 0.60 - 4.47 Thousands/µL    Monocytes Absolute 0.70 0.17 - 1.22 Thousand/µL    Eosinophils Absolute 0.13 0.00 - 0.61 Thousand/µL    Basophils Absolute 0.02 0.00 - 0.10 Thousands/µL   Protime-INR    Collection Time: 09/06/23  2:55 AM   Result Value Ref Range    Protime 13.3 11.6 - 14.5 seconds    INR 0.95 0.84 - 1.19   APTT    Collection Time: 09/06/23  2:55 AM   Result Value Ref Range    PTT 27 23 - 37 seconds   Comprehensive metabolic panel    Collection Time: 09/06/23  2:55 AM   Result Value Ref Range    Sodium 134 (L) 135 - 147 mmol/L    Potassium 4.0 3.5 - 5.3 mmol/L    Chloride 102 96 - 108 mmol/L    CO2 26 21 - 32 mmol/L    ANION GAP 6 mmol/L    BUN 8 5 - 25 mg/dL    Creatinine 0.67 0.60 - 1.30 mg/dL    Glucose 95 65 - 140 mg/dL    Calcium 9.2 8.4 - 10.2 mg/dL    AST 15 13 - 39 U/L    ALT 10 7 - 52 U/L    Alkaline Phosphatase 61 34 - 104 U/L    Total Protein 7.2 6.4 - 8.4 g/dL    Albumin 4.3 3.5 - 5.0 g/dL    Total Bilirubin 0.29 0.20 - 1.00 mg/dL    eGFR 123 ml/min/1.73sq m   Lipase    Collection Time: 09/06/23  2:55 AM   Result Value Ref Range    Lipase 16 11 - 82 u/L   Magnesium    Collection Time: 09/06/23  2:55 AM   Result Value Ref Range    Magnesium 1.9 1.9 - 2.7 mg/dL   Type and screen    Collection Time: 09/06/23  2:55 AM   Result Value Ref Range    ABO Grouping O     Rh Factor Positive     Antibody Screen Negative     Specimen Expiration Date 20230909    hCG, quantitative    Collection Time: 09/06/23  2:55 AM   Result Value Ref Range    HCG, Quant 128,361 (H) 0 - 5 mIU/mL   hCG, quantitative    Collection Time: 09/08/23 11:11 AM   Result Value Ref Range    HCG, Quant 153,877 (H) 0 - 5 mIU/mL   Urinalysis with microscopic    Collection Time: 09/29/23  9:17 AM   Result Value Ref Range    Color, UA Yellow     Clarity, UA Clear     Specific Gravity, UA 1.024 1.003 - 1.030    pH, UA 6.5 4.5, 5.0, 5.5, 6.0, 6.5, 7.0, 7.5, 8.0    Leukocytes, UA Negative Negative    Nitrite, UA Negative Negative    Protein, UA Trace (A) Negative mg/dl    Glucose, UA Negative Negative mg/dl    Ketones, UA Negative Negative mg/dl    Urobilinogen, UA <2.0 <2.0 mg/dl mg/dl    Bilirubin, UA Negative Negative    Occult Blood, UA Negative Negative    RBC, UA None Seen None Seen, 1-2 /hpf    WBC, UA 1-2 None Seen, 1-2 /hpf    Epithelial Cells Occasional None Seen, Occasional /hpf    Bacteria, UA Occasional None Seen, Occasional /hpf    MUCUS THREADS Moderate (A) None Seen   Cystic fibrosis gene test    Collection Time: 09/29/23  9:17 AM   Result Value Ref Range    Cystic Fibrosis Screen Comment:     CF COMMENT Comment    Spinal muscular atrophy DNA    Collection Time: 09/29/23  9:17 AM   Result Value Ref Range    Ethnicity Comment     Specimen Type Comment     Clinical Indication Comment     Result Comment     Interpretation Comment     Recommendations Comment     Additional Clinical Info Comment     COMMENTS Comment     Methods/Limitations Comment     Information Table Comment     REFERENCES: Comment     Director Review Comment    CBC and differential    Collection Time: 09/29/23  9:17 AM   Result Value Ref Range    WBC 6.01 4.31 - 10.16 Thousand/uL    RBC 4.11 3.81 - 5.12 Million/uL    Hemoglobin 11.7 11.5 - 15.4 g/dL    Hematocrit 35.4 34.8 - 46.1 %    MCV 86 82 - 98 fL    MCH 28.5 26.8 - 34.3 pg    MCHC 33.1 31.4 - 37.4 g/dL    RDW 12.0 11.6 - 15.1 %    MPV 10.2 8.9 - 12.7 fL    Platelets 418 568 - 721 Thousands/uL    nRBC 0 /100 WBCs    Neutrophils Relative 52 43 - 75 %    Immat GRANS % 0 0 - 2 %    Lymphocytes Relative 37 14 - 44 %    Monocytes Relative 9 4 - 12 %    Eosinophils Relative 2 0 - 6 %    Basophils Relative 0 0 - 1 %    Neutrophils Absolute 3.16 1.85 - 7.62 Thousands/µL    Immature Grans Absolute 0.01 0.00 - 0.20 Thousand/uL    Lymphocytes Absolute 2.21 0.60 - 4.47 Thousands/µL    Monocytes Absolute 0.51 0.17 - 1.22 Thousand/µL    Eosinophils Absolute 0.10 0.00 - 0.61 Thousand/µL    Basophils Absolute 0.02 0.00 - 0.10 Thousands/µL   Hepatitis B surface antigen    Collection Time: 09/29/23  9:17 AM   Result Value Ref Range    Hepatitis B Surface Ag Non-reactive Non-Reactive   Hepatitis C antibody    Collection Time: 09/29/23  9:17 AM   Result Value Ref Range    Hepatitis C Ab Non-reactive Non-Reactive   Hgb Fractionation Cascade    Collection Time: 09/29/23  9:17 AM   Result Value Ref Range    Hgb F Quant 0.0 0.0 - 2.0 % Hgb A 97.0 96.4 - 98.8 %    Hgb S Quant 0.0 0.0 %    Hgb A2 Quant 3.0 1.8 - 3.2 %    Hgb Interp. Comment    HIV 1/2 AG/AB w Reflex [facility] for 2 yr old and above    Collection Time: 09/29/23  9:17 AM   Result Value Ref Range    HIV-1 p24 Antigen Non-Reactive Non-Reactive    HIV-1 Antibody Non-Reactive Non-Reactive    HIV-2 Antibody Non-Reactive Non-Reactive    HIV Ag-Ab 5th Gen Non-Reactive Non-Reactive   Rubella antibody, IgG    Collection Time: 09/29/23  9:17 AM   Result Value Ref Range    Rubella IgG Quant 35.3 >14.9 IU/mL   RPR-Syphilis Screening (Total Syphilis IGG/IGM)    Collection Time: 09/29/23  9:17 AM   Result Value Ref Range    Syphilis Total Antibody Non-reactive Non-Reactive   Type and screen    Collection Time: 09/29/23  9:17 AM   Result Value Ref Range    ABO Grouping O     Rh Factor Positive     Antibody Screen Negative     Specimen Expiration Date 20231002    Urine culture    Collection Time: 09/29/23  9:17 AM    Specimen: Urine   Result Value Ref Range    Urine Culture No Growth <1000 cfu/mL    Varicella zoster antibody, IgG    Collection Time: 09/29/23  9:17 AM   Result Value Ref Range    Varicella IgG NON-IMMUNE (A) IMMUNE       Assessment/Plan:    Viral infection, unspecified  - It would seem as though she has a viral upper respiratory infection. She did perform COVID-19 testing at home    -Advised on supportive care measures including rest and fluids    -Likely some other viral infection. Doubtful for influenza as she is not febrile    -Attempted to send budesonide nasal spray however it would appear that needed prior authorization on a Friday. Budesonide is considered category B in pregnancy.   Fluticasone nasal spray is largely considered safe though it is pregnancy category C    -Advised patient that she is safe to take half tablet of doxylamine in the evening which may help with her nasal congestion and help her to sleep    -Advised patient to contact the office if symptoms are worsening or fail to improve          Problem List Items Addressed This Visit        Other    Viral infection, unspecified - Primary     - It would seem as though she has a viral upper respiratory infection. She did perform COVID-19 testing at home    -Advised on supportive care measures including rest and fluids    -Likely some other viral infection. Doubtful for influenza as she is not febrile    -Attempted to send budesonide nasal spray however it would appear that needed prior authorization on a Friday. Budesonide is considered category B in pregnancy.   Fluticasone nasal spray is largely considered safe though it is pregnancy category C    -Advised patient that she is safe to take half tablet of doxylamine in the evening which may help with her nasal congestion and help her to sleep    -Advised patient to contact the office if symptoms are worsening or fail to improve         Relevant Medications    doxylamine (UNISOM) 25 MG tablet    fluticasone (FLONASE) 50 mcg/act nasal spray

## 2023-10-06 NOTE — ASSESSMENT & PLAN NOTE
- It would seem as though she has a viral upper respiratory infection. She did perform COVID-19 testing at home    -Advised on supportive care measures including rest and fluids    -Likely some other viral infection. Doubtful for influenza as she is not febrile    -Attempted to send budesonide nasal spray however it would appear that needed prior authorization on a Friday. Budesonide is considered category B in pregnancy.   Fluticasone nasal spray is largely considered safe though it is pregnancy category C    -Advised patient that she is safe to take half tablet of doxylamine in the evening which may help with her nasal congestion and help her to sleep    -Advised patient to contact the office if symptoms are worsening or fail to improve

## 2023-10-10 NOTE — PROGRESS NOTES
OB/GYN  PN Visit  Porsha Yi  [id number]  10/12/2023  1:26 PM  ELYSE Arroyo    S: 24 y.o.  12w0d here for PN visit. Pregnancy is uncomplicated. OB Complaints:  Denies c/o n/v/ha, no edema, no smoking, no DV. No vb/lof  No cramping/ctxns or signs of PTL. Established care with MFM. O:    Pre-Melinda Vitals      Flowsheet Row Most Recent Value   Prenatal Assessment    Fetal Heart Rate 156   Prenatal Vitals    Blood Pressure 106/68   Weight - Scale 69.4 kg (153 lb)   Urine Albumin/Glucose    Dilation/Effacement/Station    Vaginal Drainage    Edema               Gen: no acute distress, nonlabored breathing. OB exam completed: fundal height, +FHT  Urine: -/-    A/P:  #1. 12w0d GESTATION  Physical exam and cultures done today. Last pap: . Pap done today per ASCCP guidelines. AFP to be given at next visit.     RTC in 4 weeks

## 2023-10-12 ENCOUNTER — INITIAL PRENATAL (OUTPATIENT)
Dept: OBGYN CLINIC | Facility: CLINIC | Age: 23
End: 2023-10-12
Payer: COMMERCIAL

## 2023-10-12 VITALS
DIASTOLIC BLOOD PRESSURE: 68 MMHG | SYSTOLIC BLOOD PRESSURE: 106 MMHG | BODY MASS INDEX: 28.16 KG/M2 | HEIGHT: 62 IN | WEIGHT: 153 LBS

## 2023-10-12 DIAGNOSIS — Z34.92 SECOND TRIMESTER PREGNANCY: Primary | ICD-10-CM

## 2023-10-12 PROCEDURE — 87661 TRICHOMONAS VAGINALIS AMPLIF: CPT

## 2023-10-12 PROCEDURE — 87491 CHLMYD TRACH DNA AMP PROBE: CPT

## 2023-10-12 PROCEDURE — G0145 SCR C/V CYTO,THINLAYER,RESCR: HCPCS

## 2023-10-12 PROCEDURE — 87591 N.GONORRHOEAE DNA AMP PROB: CPT

## 2023-10-12 PROCEDURE — 99213 OFFICE O/P EST LOW 20 MIN: CPT

## 2023-10-16 ENCOUNTER — ROUTINE PRENATAL (OUTPATIENT)
Facility: HOSPITAL | Age: 23
End: 2023-10-16
Payer: COMMERCIAL

## 2023-10-16 VITALS
HEIGHT: 62 IN | SYSTOLIC BLOOD PRESSURE: 102 MMHG | WEIGHT: 153 LBS | DIASTOLIC BLOOD PRESSURE: 64 MMHG | HEART RATE: 94 BPM | BODY MASS INDEX: 28.16 KG/M2

## 2023-10-16 DIAGNOSIS — Z34.81 MULTIGRAVIDA IN FIRST TRIMESTER: ICD-10-CM

## 2023-10-16 DIAGNOSIS — Z3A.12 12 WEEKS GESTATION OF PREGNANCY: ICD-10-CM

## 2023-10-16 DIAGNOSIS — Z36.82 ENCOUNTER FOR (NT) NUCHAL TRANSLUCENCY SCAN: Primary | ICD-10-CM

## 2023-10-16 LAB
C TRACH DNA SPEC QL NAA+PROBE: NEGATIVE
N GONORRHOEA DNA SPEC QL NAA+PROBE: NEGATIVE
T VAGINALIS DNA SPEC QL NAA+PROBE: NEGATIVE

## 2023-10-16 PROCEDURE — 76813 OB US NUCHAL MEAS 1 GEST: CPT | Performed by: STUDENT IN AN ORGANIZED HEALTH CARE EDUCATION/TRAINING PROGRAM

## 2023-10-16 PROCEDURE — 99242 OFF/OP CONSLTJ NEW/EST SF 20: CPT | Performed by: STUDENT IN AN ORGANIZED HEALTH CARE EDUCATION/TRAINING PROGRAM

## 2023-10-16 NOTE — PROGRESS NOTES
[address]: Ms. Sabi Kirk was seen today for nuchal translucency ultrasound. See ultrasound report under "OB Procedures" tab. Physical Exam  Constitutional:       General: She is not in acute distress. Appearance: Normal appearance. HENT:      Head: Normocephalic and atraumatic. Eyes:      Extraocular Movements: Extraocular movements intact. Cardiovascular:      Rate and Rhythm: Normal rate. Pulmonary:      Effort: Pulmonary effort is normal. No respiratory distress. Skin:     Findings: No erythema or rash. Neurological:      Mental Status: She is alert and oriented to person, place, and time. Psychiatric:         Mood and Affect: Mood normal.         Behavior: Behavior normal.         Please don't hesitate to contact our office with any concerns or questions.   -Daniel Fontanez MD

## 2023-10-16 NOTE — LETTER
October 16, 2023     Roseline Kirby PA-C  [address] [facility] [address]    Patient: Aj Burt   YOB: 2000   Date of Visit: 10/16/2023       Dear Dr. Machelle Lopez: Thank you for referring Aj Burt to me for evaluation. Below are my notes for this consultation. If you have questions, please do not hesitate to call me. I look forward to following your patient along with you. Sincerely,        Sanjeev Marshall MD        CC: No Recipients    Sanjeev Marshall MD  10/16/2023 11:10 AM  Sign when Signing Visit  [address]: Ms. Melburn Litten was seen today for nuchal translucency ultrasound. See ultrasound report under "OB Procedures" tab. Physical Exam  Constitutional:       General: She is not in acute distress. Appearance: Normal appearance. HENT:      Head: Normocephalic and atraumatic. Eyes:      Extraocular Movements: Extraocular movements intact. Cardiovascular:      Rate and Rhythm: Normal rate. Pulmonary:      Effort: Pulmonary effort is normal. No respiratory distress. Skin:     Findings: No erythema or rash. Neurological:      Mental Status: She is alert and oriented to person, place, and time. Psychiatric:         Mood and Affect: Mood normal.         Behavior: Behavior normal.         Please don't hesitate to contact our office with any concerns or questions.   -Sanjeev Marshall MD

## 2023-10-20 LAB
LAB AP GYN PRIMARY INTERPRETATION: NORMAL
Lab: NORMAL

## 2023-10-27 ENCOUNTER — TELEPHONE (OUTPATIENT)
Dept: OBGYN CLINIC | Facility: CLINIC | Age: 23
End: 2023-10-27

## 2023-10-27 NOTE — TELEPHONE ENCOUNTER
Patient called - c/o Lower abdominal pressure earlier today that was intermittent, lasting a few minutes ( usually happens more at night), left side has a "pulling" sensation on lower left abdomen into the groin. Nauseous. Denies vomiting, VB, discharge, shortness of breath, lightheadedness, dizziness, chest pain, visual changes, palpitations. No hx HTN. Dizzy spells occasionally with positional change, able to resolve within a few seconds of sitting down when they happen. Patient is not currently experiencing any symptoms. Review  labor signs and when to contact the office - loss of fluid, vaginal bleeding/spotting, HA that doesn't go away with Tylenol, lightheadedness, dizziness, shortness of breath, chest pain, visual disturbances. . Contractions lasting 5-7 minutes apart lasting 30-90 seconds, decreased fetal movement, abdominal pain, unexplained swelling of face or extremities. Encouraged patient to rest and hydrate. Reviewed with Dr. Sandoval Parents on call - rest hydrate, change position every so often prolonged sitting or prolonged standing or suddenly standing too quickly is what usually cause drop in blood pressure and light headed symptoms. Patient made aware and had no additional questions or concerns.

## 2023-10-27 NOTE — TELEPHONE ENCOUNTER
Patient called, left message on answering machine, having pressure in lower abdomen and pain in her left side - unsure if this is normal or if she should be concerned. Requesting a return call.

## 2023-11-06 ENCOUNTER — ROUTINE PRENATAL (OUTPATIENT)
Dept: OBGYN CLINIC | Facility: CLINIC | Age: 23
End: 2023-11-06
Payer: COMMERCIAL

## 2023-11-06 VITALS
BODY MASS INDEX: 28.93 KG/M2 | WEIGHT: 157.2 LBS | HEIGHT: 62 IN | DIASTOLIC BLOOD PRESSURE: 66 MMHG | SYSTOLIC BLOOD PRESSURE: 98 MMHG

## 2023-11-06 DIAGNOSIS — Z34.92 SECOND TRIMESTER PREGNANCY: Primary | ICD-10-CM

## 2023-11-06 DIAGNOSIS — O20.9 VAGINAL BLEEDING IN PREGNANCY, FIRST TRIMESTER: ICD-10-CM

## 2023-11-06 DIAGNOSIS — R55 NEAR SYNCOPE: ICD-10-CM

## 2023-11-06 PROCEDURE — 99213 OFFICE O/P EST LOW 20 MIN: CPT | Performed by: PHYSICIAN ASSISTANT

## 2023-11-06 NOTE — PROGRESS NOTES
OB/GYN  PN Visit  Porsha Yi  [id number]  2023  10:08 AM  Corey Do PA-C    S: 24 y.o. Corey Blas 15w4d here for PN visit. Pregnancy complicated by 1st trimester vaginal bleeding. She has not had any recurrent VB. OB complaints:  Denies c/o n/v/ha, no edema, no smoking, no DV. No vb/lof  No cramping/ctxns or signs of PTL. Pt does feel like she has experienced flutters. She did have an episode yesterday of near syncope. Was standing for a prolonged time and felt dizzy. She sat down and was able to remain safe without falling. Sx resolved. She denies associated cardiac sx. We reviewed cardiovascular changes that occur in pregnancy. We reviewed baseline low BP as well. Advised importance of hydration, small frequent meals as well. Will monitor sx and call with recurrences. She does admit to likely not staying well hydrated. Has appt tomorrow w [facility] for NIPs. O:    Pre-Melinda Vitals      Flowsheet Row Most Recent Value   Prenatal Assessment    Fetal Heart Rate 155   Fundal Height (cm) 16 cm   Movement Present   Prenatal Vitals    Blood Pressure 98/66   Weight - Scale 71.3 kg (157 lb 3.2 oz)   Urine Albumin/Glucose    Dilation/Effacement/Station    Vaginal Drainage    Draining Fluid No   Edema    LLE Edema None   RLE Edema None   Facial Edema None              Gen: no acute distress, nonlabored breathing. OB exam completed: fundal height, +FHT. Urine: -/-    A/P:  #1. 15w4d GESTATION  Labor precautions reviewed  Fetal kick counts reviewed  Flu: she does not historically receive it, but will consider at a future appt  Will watch dizziness and near syncope and call with recurrence.  Should also plan to monitor BP as well, especially if sx continue      RTC in 4 weeks    Corey Do PA-C  2023  10:08 AM

## 2023-11-07 ENCOUNTER — CLINICAL SUPPORT (OUTPATIENT)
Facility: HOSPITAL | Age: 23
End: 2023-11-07
Payer: COMMERCIAL

## 2023-11-07 DIAGNOSIS — Z33.1 PREGNANT STATE, INCIDENTAL: Primary | ICD-10-CM

## 2023-11-07 DIAGNOSIS — Z36.9 UNSPECIFIED ANTENATAL SCREENING: ICD-10-CM

## 2023-11-07 PROCEDURE — 36415 COLL VENOUS BLD VENIPUNCTURE: CPT | Performed by: OBSTETRICS & GYNECOLOGY

## 2023-11-07 NOTE — PROGRESS NOTES
Patient chose to have [facility] Non-invasive Prenatal Screen with fetal sex. Patient choose billed through insurance. Patient assistance program (PAP) application provided to patient no. PAP sent with specimen No.     Patient given brochure and is aware [employer] will contact their insurance and coordinate coverage. Patient made aware she will receive a text message and e-mail from [employer]. Patient informed text/phone call message will come from area code  "415". Provided [employer] # [phone] and web site at [employer].   "[place] your test online" card with barcode and test tube ID provided to patient. Reviewed [employer]'s web site states 5-7 business days for results via their portal.   [employer] message will be sent to patient when [facility] receives results /provider reviews. 2 vials of blood drawn from  left  arm by Luciana Mccoy MA. Patient tolerated blood draw without difficulty. Specimens labeled with patient identifiers (name, date of birth, specimen collection date), order and specimen were verified with patient, packed and sent via [address] [facility]. FED EX  tracking #  [id number]  Copy of lab order scanned to Epic media. Maternal Fetal Medicine will have results in approximately 7-10 business days and will call patient or notify via [address] [place] [address]. Patient aware viewing lab result online will reveal fetal sex if ordered. Patient verbalized understanding of all instructions and no questions at this time.

## 2023-11-13 ENCOUNTER — TELEPHONE (OUTPATIENT)
Facility: HOSPITAL | Age: 23
End: 2023-11-13

## 2023-11-13 NOTE — TELEPHONE ENCOUNTER
----- Message from Ramone Hawkins MD sent at 11/13/2023  8:54 AM EST -----  Regarding: NIPT  The NIPT result was reviewed by me. The result reveals low risk for trisomies 24, 25, 15, and sex chromosome aneuploidies.   ----- Message -----  From: Interface, Transcription Incoming  Sent: 11/13/2023   1:39 AM EST  To: Ramone Hawkins MD

## 2023-11-13 NOTE — TELEPHONE ENCOUNTER
Spoke with pt and provided her with the results of the NIPS, gender NOT provided. PT instructed on MSAFP being ordered by OB and timing of the test.  PT aware that MSAFP was already ordered by OB. PT receptive to information and declines questions at this time.

## 2023-11-20 ENCOUNTER — TELEPHONE (OUTPATIENT)
Dept: OBGYN CLINIC | Facility: CLINIC | Age: 23
End: 2023-11-20

## 2023-11-20 ENCOUNTER — OFFICE VISIT (OUTPATIENT)
Dept: FAMILY MEDICINE CLINIC | Facility: CLINIC | Age: 23
End: 2023-11-20
Payer: COMMERCIAL

## 2023-11-20 VITALS
RESPIRATION RATE: 18 BRPM | BODY MASS INDEX: 29.44 KG/M2 | HEIGHT: 62 IN | SYSTOLIC BLOOD PRESSURE: 120 MMHG | HEART RATE: 100 BPM | TEMPERATURE: 98.1 F | DIASTOLIC BLOOD PRESSURE: 60 MMHG | OXYGEN SATURATION: 98 % | WEIGHT: 160 LBS

## 2023-11-20 DIAGNOSIS — J01.90 ACUTE SINUSITIS, RECURRENCE NOT SPECIFIED, UNSPECIFIED LOCATION: Primary | ICD-10-CM

## 2023-11-20 DIAGNOSIS — Z34.90 PREGNANCY, UNSPECIFIED GESTATIONAL AGE: ICD-10-CM

## 2023-11-20 PROCEDURE — 99213 OFFICE O/P EST LOW 20 MIN: CPT | Performed by: NURSE PRACTITIONER

## 2023-11-20 RX ORDER — CEFDINIR 300 MG/1
300 CAPSULE ORAL EVERY 12 HOURS SCHEDULED
Qty: 14 CAPSULE | Refills: 0 | Status: SHIPPED | OUTPATIENT
Start: 2023-11-20 | End: 2023-11-27

## 2023-11-20 NOTE — PROGRESS NOTES
Name: Steven Beckett      : 2000      MRN: [id number]  Encounter Provider: ELYSE Hughes  Encounter Date: 2023   Encounter department: [address] [place] [address]     1. Acute sinusitis, recurrence not specified, unspecified location  -     cefdinir (OMNICEF) 300 mg capsule; Take 1 capsule (300 mg total) by mouth every 12 (twelve) hours for 7 days    2. Pregnancy, unspecified gestational age        Patient reports nasal congestion for the past 3 days. Patient also reports sinus pressure and sore throat. Denies any fever, earache, cough, or vomiting. Patient reports that she is 17 weeks pregnant. Discussion on acute sinusitis and treatment. Cefdinir prescribed. Medication information and side effects reviewed. Patient instructed to drink plenty of fluids. Patient instructed to follow-up if symptoms get worse or do not get better. Subjective      Patient reports nasal congestion for the past 3 days. Patient also reports sinus pressure since yesterday. Denies any fever. Patient also reports a sore throat. Denies any earache, cough, vomiting, or diarrhea. Patient reports that she tried benadryl OTC. Patient reports that her symptoms are getting worse. Patient reports that she is 17 weeks pregnant. Review of Systems   Constitutional:  Negative for chills and fever. HENT:          As noted in HPI. Respiratory:  Negative for cough, chest tightness, shortness of breath and wheezing. Cardiovascular:  Negative for chest pain. Gastrointestinal:  Negative for abdominal pain, diarrhea and vomiting. Musculoskeletal:  Negative for myalgias. Skin:  Negative for rash. Neurological:  Positive for headaches (sinus HAs). Negative for syncope.        Current Outpatient Medications on File Prior to Visit   Medication Sig    Prenatal Multivit-Min-Fe-FA (PRE- FORMULA PO) Take by mouth    doxylamine (UNISOM) 25 MG tablet Take 0.5 tablets (12.5 mg total) by mouth daily at bedtime as needed for sleep (Patient not taking: Reported on 10/12/2023)    fluticasone (FLONASE) 50 mcg/act nasal spray 2 sprays into each nostril daily (Patient not taking: Reported on 10/12/2023)       Objective     /60   Pulse 100   Temp 98.1 °F (36.7 °C)   Resp 18   Ht 5' 2" (1.575 m)   Wt 72.6 kg (160 lb)   LMP 07/05/2023 (Exact Date)   SpO2 98%   BMI 29.26 kg/m²     Physical Exam  Vitals reviewed. Constitutional:       General: She is not in acute distress. Appearance: She is ill-appearing. She is not diaphoretic. HENT:      Right Ear: Tympanic membrane, ear canal and external ear normal.      Left Ear: Tympanic membrane, ear canal and external ear normal.      Nose: Congestion present. Right Sinus: Maxillary sinus tenderness present. Left Sinus: Maxillary sinus tenderness present. Mouth/Throat:      Mouth: Mucous membranes are moist.      Pharynx: Oropharynx is clear. No oropharyngeal exudate or posterior oropharyngeal erythema. Eyes:      Conjunctiva/sclera: Conjunctivae normal.      Pupils: Pupils are equal, round, and reactive to light. Cardiovascular:      Rate and Rhythm: Normal rate and regular rhythm. Pulses: Normal pulses. Heart sounds: Normal heart sounds. Pulmonary:      Effort: Pulmonary effort is normal. No respiratory distress. Breath sounds: Normal breath sounds. No wheezing. Skin:     Findings: No rash. Neurological:      Mental Status: She is alert and oriented to person, place, and time.    Psychiatric:         Mood and Affect: Mood normal.       ELYSE Herrera

## 2023-11-20 NOTE — TELEPHONE ENCOUNTER
Called and spoke to patient. Patient reports congestion, green snot, discomfort on face near sinuses and fatigue. Patient states she was seen by PCP today and received prescription for antibiotic, safe in pregnancy, to take for sinus infection if worsening symptoms or no improvement in the next 24-48 hours. Patient wanted to review any other OTC recommendations safe in pregnancy. Patient verbalizes understanding of encouraged hydration and rest, tylenol as needed for pain/discomfort can take 1000mg every 8 hours (not to exceed 3000mg in 24 hour period), saline nasal spray, and warm compresses as needed. Patient inquiring if able to take unisom as needed or benadryl, reviewed safe in pregnancy and both medications can cause drowsiness. Patient verbalizes understanding and aware to call office if worsening or change in symptoms, questions or concerns. Patient has no additional questions at this time.

## 2023-11-20 NOTE — TELEPHONE ENCOUNTER
Patient left message on machine - is sick. Just met with PCP. Has sinus infection. Was prescribed abx to take within 24-48 hours if feeling worse. Would prefer to take something OTC. Would like to know recommendations, does have paper of medications safe in pregnancy received from office. Was taking unisom about one month ago, not sure if safe to take in second trimester. Has been taking benadryl. Wanted to see if one was better or both safe.

## 2023-11-29 ENCOUNTER — APPOINTMENT (OUTPATIENT)
Dept: LAB | Facility: CLINIC | Age: 23
End: 2023-11-29
Payer: COMMERCIAL

## 2023-11-29 DIAGNOSIS — Z34.92 SECOND TRIMESTER PREGNANCY: ICD-10-CM

## 2023-11-29 PROCEDURE — 36415 COLL VENOUS BLD VENIPUNCTURE: CPT

## 2023-11-29 PROCEDURE — 82105 ALPHA-FETOPROTEIN SERUM: CPT

## 2023-12-01 LAB
2ND TRIMESTER 4 SCREEN SERPL-IMP: NORMAL
AFP ADJ MOM SERPL: 1.33
AFP INTERP AMN-IMP: NORMAL
AFP INTERP SERPL-IMP: NORMAL
AFP INTERP SERPL-IMP: NORMAL
AFP SERPL-MCNC: 71.9 NG/ML
AGE AT DELIVERY: 24.9 YR
GA METHOD: NORMAL
GA: 18.9 WEEKS
IDDM PATIENT QL: NO
MULTIPLE PREGNANCY: NO
NEURAL TUBE DEFECT RISK FETUS: 8798 %

## 2023-12-04 ENCOUNTER — ROUTINE PRENATAL (OUTPATIENT)
Dept: OBGYN CLINIC | Facility: CLINIC | Age: 23
End: 2023-12-04
Payer: COMMERCIAL

## 2023-12-04 VITALS
DIASTOLIC BLOOD PRESSURE: 66 MMHG | SYSTOLIC BLOOD PRESSURE: 110 MMHG | HEIGHT: 62 IN | BODY MASS INDEX: 30 KG/M2 | WEIGHT: 163 LBS

## 2023-12-04 DIAGNOSIS — Z34.92 SECOND TRIMESTER PREGNANCY: Primary | ICD-10-CM

## 2023-12-04 PROCEDURE — 99213 OFFICE O/P EST LOW 20 MIN: CPT | Performed by: PHYSICIAN ASSISTANT

## 2023-12-04 NOTE — PROGRESS NOTES
OB/GYN  PN Visit  Brianne Aaron  [id number]  2023  9:43 AM  Ginger Bridges PA-C    S: 24 y.o.  19w4d here for PN visit. Pregnancy complicated by 1st trimester VB, which resolved without recurrence. .     OB complaints:  Denies c/o n/v/ha, no edema, no smoking, no DV. No vb/lof  No cramping/ctxns or signs of PTL. She reports that she did have some d/c last night. Was more mucousy. Denies any other associated sx. No vb. No pelvic pain, pressure, cramping or discomfort. No urinary sx, no odor, no fevers/chills. No recent IC. Advised s/sx PTL to watch for. Will watch for recurrence of any new associated sx. Will f/u 4 weeks, earlier as needed. O:    Pre-Melinda Vitals      Flowsheet Row Most Recent Value   Prenatal Assessment    Fetal Heart Rate 162   Fundal Height (cm) 20 cm   Movement Present   Prenatal Vitals    Blood Pressure 110/66   Weight - Scale 73.9 kg (163 lb)   Urine Albumin/Glucose    Dilation/Effacement/Station    Vaginal Drainage    Draining Fluid No   Edema    LLE Edema None   RLE Edema None   Facial Edema None              Gen: no acute distress, nonlabored breathing. OB exam completed: fundal height, +FHT.   Urine: -/-    A/P:  #1. 19w4d GESTATION  Labor precautions reviewed  Fetal kick counts reviewed  Flu: refused        RTC in 4 weeks    Ginger Bridges PA-C  2023  9:43 AM

## 2023-12-07 ENCOUNTER — ROUTINE PRENATAL (OUTPATIENT)
Facility: HOSPITAL | Age: 23
End: 2023-12-07
Payer: COMMERCIAL

## 2023-12-07 VITALS
DIASTOLIC BLOOD PRESSURE: 58 MMHG | HEIGHT: 62 IN | BODY MASS INDEX: 29.87 KG/M2 | HEART RATE: 104 BPM | WEIGHT: 162.3 LBS | SYSTOLIC BLOOD PRESSURE: 98 MMHG

## 2023-12-07 DIAGNOSIS — Z3A.20 20 WEEKS GESTATION OF PREGNANCY: Primary | ICD-10-CM

## 2023-12-07 PROCEDURE — 76817 TRANSVAGINAL US OBSTETRIC: CPT | Performed by: OBSTETRICS & GYNECOLOGY

## 2023-12-07 PROCEDURE — 99213 OFFICE O/P EST LOW 20 MIN: CPT | Performed by: OBSTETRICS & GYNECOLOGY

## 2023-12-07 PROCEDURE — 76805 OB US >/= 14 WKS SNGL FETUS: CPT | Performed by: OBSTETRICS & GYNECOLOGY

## 2023-12-07 NOTE — PROGRESS NOTES
Ultrasound Probe Disinfection    A transvaginal ultrasound was performed. Prior to use, disinfection was performed with High Level Disinfection Process ([employer]). Probe serial number [id number]: [id number] was used.       Ees Pereira  12/07/23  10:45 AM

## 2023-12-07 NOTE — LETTER
December 7, 2023     Esther Kovacs PA-C  [address] [facility] [place] [address]    Patient: Isadora Montalvo   YOB: 2000   Date of Visit: 12/7/2023       Dear Ms. Zaidi: Thank you for referring Isadora Montalvo to me for evaluation. Below are my notes for this consultation. If you have questions, please do not hesitate to call me. I look forward to following your patient along with you. Sincerely,        Choco Bray MD        CC: No Recipients    Choco Bray MD  12/7/2023  3:35 PM  Sign when Signing Visit  A fetal ultrasound was completed. See Ob procedures in Epic for an interpretation and recommendations. Do not hesitate to contact us in [facility] if you have questions. Peewee Yeager MD, [address] [facility]  Maternal Fetal Medicine

## 2023-12-07 NOTE — PROGRESS NOTES
A fetal ultrasound was completed. See Ob procedures in Epic for an interpretation and recommendations. Do not hesitate to contact us in [facility] if you have questions. Raiaz Brock MD, [address] [facility]  Maternal Fetal Medicine

## 2023-12-28 ENCOUNTER — TELEPHONE (OUTPATIENT)
Age: 23
End: 2023-12-28

## 2023-12-28 NOTE — TELEPHONE ENCOUNTER
Patient called in stating her left eye lid is swollen. No appts that I could schedule the patient. Spoke with Cori who could not schedule the patient due to no openings and urgent care was suggested  to the patient. No further action needed at this time.

## 2024-01-02 ENCOUNTER — ROUTINE PRENATAL (OUTPATIENT)
Dept: OBGYN CLINIC | Facility: CLINIC | Age: 24
End: 2024-01-02
Payer: COMMERCIAL

## 2024-01-02 VITALS
BODY MASS INDEX: 31.47 KG/M2 | DIASTOLIC BLOOD PRESSURE: 66 MMHG | HEIGHT: 62 IN | SYSTOLIC BLOOD PRESSURE: 104 MMHG | WEIGHT: 171 LBS

## 2024-01-02 DIAGNOSIS — Z3A.20 20 WEEKS GESTATION OF PREGNANCY: ICD-10-CM

## 2024-01-02 DIAGNOSIS — Z34.81 MULTIGRAVIDA IN FIRST TRIMESTER: Primary | ICD-10-CM

## 2024-01-02 PROCEDURE — 99212 OFFICE O/P EST SF 10 MIN: CPT | Performed by: PHYSICIAN ASSISTANT

## 2024-01-02 NOTE — PROGRESS NOTES
OB/GYN  PN Visit  Lizeth Birch  [id number]  2024  10:53 AM  Cookie Zaidi PA-C    S: 23 y.o.  23w5d here for PN visit. Pregnancy complicated by migraine w aura, vit D deficiency, and syncope.     OB complaints:  Denies c/o n/v/ha, no edema, no smoking, no DV.   No vb/lof  No cramping/ctxns or signs of PTL.    She reports that overall she is feeling well.  She denies any recurrence of low BP sx. Denies dizziness or concerns.     O:    Pre- Vitals      Flowsheet Row Most Recent Value   Prenatal Assessment    Fetal Heart Rate 155   Fundal Height (cm) 24 cm   Movement Present   Prenatal Vitals    Blood Pressure 104/66   Weight - Scale 77.6 kg (171 lb)   Urine Albumin/Glucose    Dilation/Effacement/Station    Vaginal Drainage    Draining Fluid No   Edema    LLE Edema None   RLE Edema None   Facial Edema None              Gen: no acute distress, nonlabored breathing.  OB exam completed: fundal height, +FHT.  Urine: -/-    A/P:  #1. 23w5d GESTATION  Labor precautions reviewed  Fetal kick counts reviewed  Flu: declines  Slip written for 3rd trimester labs, aware to go between 26-28 weeks.       RTC in 4 weeks    Cookie Zaidi PA-C  2024  10:53 AM

## 2024-01-15 ENCOUNTER — TELEPHONE (OUTPATIENT)
Dept: OBGYN CLINIC | Facility: CLINIC | Age: 24
End: 2024-01-15

## 2024-01-15 NOTE — TELEPHONE ENCOUNTER
Placed call to patient,  tested positive this AM for Covid. Symptoms started on 1/14/24. Patient is having night sweats, congestion, cough. Denies body aches/chills. Temperature was 99 last night. Patient is 25.4 weeks with +FM. Taking Tylenol 1,000 mg this AM, increasing fluids, resting. Advised to continue Tylenol 1,000 MG Q8hrs, saline nasal spray for congestion, Robitussin/Sudafed PRN to symptoms, rest, and hydration.     Spoke to Dr. Lindo on call - Paxlovid could be ordered as she's a candidate.     Spoke to patient, she would like to do OTC management at this point however if not feeling any relief or worsens within the next 24-48 hours she will call the office.

## 2024-01-15 NOTE — TELEPHONE ENCOUNTER
Patient called, left message on answering machine, tested positive for Covid this AM. Requesting a return call regarding management.

## 2024-01-19 PROBLEM — J01.90 ACUTE SINUSITIS: Status: RESOLVED | Noted: 2023-11-20 | Resolved: 2024-01-19

## 2024-01-26 ENCOUNTER — APPOINTMENT (OUTPATIENT)
Dept: LAB | Facility: CLINIC | Age: 24
End: 2024-01-26
Payer: COMMERCIAL

## 2024-01-26 DIAGNOSIS — Z34.81 MULTIGRAVIDA IN FIRST TRIMESTER: ICD-10-CM

## 2024-01-26 LAB
BASOPHILS # BLD AUTO: 0.03 THOUSANDS/ÂΜL (ref 0–0.1)
BASOPHILS NFR BLD AUTO: 0 % (ref 0–1)
EOSINOPHIL # BLD AUTO: 0.19 THOUSAND/ÂΜL (ref 0–0.61)
EOSINOPHIL NFR BLD AUTO: 2 % (ref 0–6)
ERYTHROCYTE [DISTWIDTH] IN BLOOD BY AUTOMATED COUNT: 12.1 % (ref 11.6–15.1)
GLUCOSE 1H P 50 G GLC PO SERPL-MCNC: 74 MG/DL (ref 40–134)
HCT VFR BLD AUTO: 34.9 % (ref 34.8–46.1)
HGB BLD-MCNC: 11.2 G/DL (ref 11.5–15.4)
IMM GRANULOCYTES # BLD AUTO: 0.11 THOUSAND/UL (ref 0–0.2)
IMM GRANULOCYTES NFR BLD AUTO: 1 % (ref 0–2)
LYMPHOCYTES # BLD AUTO: 1.79 THOUSANDS/ÂΜL (ref 0.6–4.47)
LYMPHOCYTES NFR BLD AUTO: 21 % (ref 14–44)
MCH RBC QN AUTO: 29.2 PG (ref 26.8–34.3)
MCHC RBC AUTO-ENTMCNC: 32.1 G/DL (ref 31.4–37.4)
MCV RBC AUTO: 91 FL (ref 82–98)
MONOCYTES # BLD AUTO: 0.8 THOUSAND/ÂΜL (ref 0.17–1.22)
MONOCYTES NFR BLD AUTO: 9 % (ref 4–12)
NEUTROPHILS # BLD AUTO: 5.82 THOUSANDS/ÂΜL (ref 1.85–7.62)
NEUTS SEG NFR BLD AUTO: 67 % (ref 43–75)
NRBC BLD AUTO-RTO: 0 /100 WBCS
PLATELET # BLD AUTO: 246 THOUSANDS/UL (ref 149–390)
PMV BLD AUTO: 10.3 FL (ref 8.9–12.7)
RBC # BLD AUTO: 3.84 MILLION/UL (ref 3.81–5.12)
WBC # BLD AUTO: 8.74 THOUSAND/UL (ref 4.31–10.16)

## 2024-01-26 PROCEDURE — 82950 GLUCOSE TEST: CPT

## 2024-01-26 PROCEDURE — 36415 COLL VENOUS BLD VENIPUNCTURE: CPT

## 2024-01-26 PROCEDURE — 86780 TREPONEMA PALLIDUM: CPT

## 2024-01-26 PROCEDURE — 85025 COMPLETE CBC W/AUTO DIFF WBC: CPT

## 2024-01-27 LAB — TREPONEMA PALLIDUM IGG+IGM AB [PRESENCE] IN SERUM OR PLASMA BY IMMUNOASSAY: NORMAL

## 2024-02-01 ENCOUNTER — ULTRASOUND (OUTPATIENT)
Facility: HOSPITAL | Age: 24
End: 2024-02-01
Payer: COMMERCIAL

## 2024-02-01 VITALS
SYSTOLIC BLOOD PRESSURE: 106 MMHG | DIASTOLIC BLOOD PRESSURE: 54 MMHG | HEIGHT: 62 IN | HEART RATE: 98 BPM | WEIGHT: 176.2 LBS | BODY MASS INDEX: 32.42 KG/M2

## 2024-02-01 DIAGNOSIS — Z3A.28 28 WEEKS GESTATION OF PREGNANCY: ICD-10-CM

## 2024-02-01 DIAGNOSIS — O99.210 OBESITY AFFECTING PREGNANCY, ANTEPARTUM, UNSPECIFIED OBESITY TYPE: Primary | ICD-10-CM

## 2024-02-01 PROCEDURE — 76816 OB US FOLLOW-UP PER FETUS: CPT | Performed by: OBSTETRICS & GYNECOLOGY

## 2024-02-01 PROCEDURE — 99212 OFFICE O/P EST SF 10 MIN: CPT | Performed by: OBSTETRICS & GYNECOLOGY

## 2024-02-01 NOTE — PROGRESS NOTES
The patient was seen today for an ultrasound.  Please see ultrasound report (located under Ob Procedures) for additional details.   Thank you very much for allowing us to participate in the care of this very nice patient.  Should you have any questions, please do not hesitate to contact me.     Josr Lewis MD FACOG  Attending Physician, Maternal-Fetal Medicine  [facility]

## 2024-02-06 ENCOUNTER — ROUTINE PRENATAL (OUTPATIENT)
Dept: OBGYN CLINIC | Facility: CLINIC | Age: 24
End: 2024-02-06
Payer: COMMERCIAL

## 2024-02-06 VITALS
DIASTOLIC BLOOD PRESSURE: 62 MMHG | WEIGHT: 177 LBS | HEIGHT: 62 IN | BODY MASS INDEX: 32.57 KG/M2 | SYSTOLIC BLOOD PRESSURE: 104 MMHG

## 2024-02-06 DIAGNOSIS — Z34.03 ENCOUNTER FOR SUPERVISION OF NORMAL FIRST PREGNANCY IN THIRD TRIMESTER: Primary | ICD-10-CM

## 2024-02-06 LAB
DME PARACHUTE DELIVERY DATE REQUESTED: NORMAL
DME PARACHUTE ITEM DESCRIPTION: NORMAL
DME PARACHUTE ORDER STATUS: NORMAL
DME PARACHUTE SUPPLIER NAME: NORMAL
DME PARACHUTE SUPPLIER PHONE: NORMAL

## 2024-02-06 PROCEDURE — 99213 OFFICE O/P EST LOW 20 MIN: CPT | Performed by: PHYSICIAN ASSISTANT

## 2024-02-06 NOTE — PROGRESS NOTES
VISIT 23 yr old  at 28w4d: (+) cramping - mild - once in a while - no consistency; Denies n/v/HA/vb/lof/edema/dv/smoking; urine neg/neg  (+) constipation - goes every 1-2 days - states comes out in small amounts - reviewed trying magnesium supplement or stool softener daily; reviewed high fiber diet and hydration; Can consider PNV every other day  Labs up to date; PNC - 28 week growth WNL - no further ultrasound scheduled   PNVs + DHA - tolerating daily  Good FM - r/christel 10 kicks/2 hrs  Tdap - reviewed and encouraged - will consider and would like to read further; Referred to the CDC website for more information   Declines flu vaccine    Breast pump - ordered; Peds - referral placed; Contraception - undecided;  Yellow folder given and reviewed; Delivery Care Consent reviewed - patient desires to take home and will bring back signed   Encouraged hydration. Reviewed signs and symptoms of labor and preE and when to call  Encouraged handwashing/infection prevention  RTO in 2 weeks for routine ob check or sooner if needed

## 2024-02-20 ENCOUNTER — ROUTINE PRENATAL (OUTPATIENT)
Dept: OBGYN CLINIC | Facility: CLINIC | Age: 24
End: 2024-02-20
Payer: COMMERCIAL

## 2024-02-20 VITALS — DIASTOLIC BLOOD PRESSURE: 68 MMHG | SYSTOLIC BLOOD PRESSURE: 108 MMHG | BODY MASS INDEX: 33.47 KG/M2 | WEIGHT: 183 LBS

## 2024-02-20 DIAGNOSIS — Z34.83 MULTIGRAVIDA IN THIRD TRIMESTER: Primary | ICD-10-CM

## 2024-02-20 DIAGNOSIS — Z3A.28 28 WEEKS GESTATION OF PREGNANCY: ICD-10-CM

## 2024-02-20 PROCEDURE — 99213 OFFICE O/P EST LOW 20 MIN: CPT | Performed by: PHYSICIAN ASSISTANT

## 2024-02-20 NOTE — PROGRESS NOTES
OB/GYN  PN Visit  Lizeth Birch  [id number]  2024  10:12 AM  Cookie Zaidi PA-C    S: 23 y.o.  30w5d here for PN visit. Pregnancy complicated by varicella NI.     OB complaints:  Denies c/o n/v/ha, no edema, no smoking, no DV.   No vb/lof  No cramping/ctxns or signs of PTL.    She reports that she had an episode of light cramping last week that resolved. We reviewed s/sx PTL to call with. .  Last MFM scan 24 normal fetal growth-no further f/u planned.     O:    Pre- Vitals      Flowsheet Row Most Recent Value   Prenatal Assessment    Fetal Heart Rate 155   Fundal Height (cm) 31 cm   Movement Present   Prenatal Vitals    Blood Pressure 108/68   Weight - Scale 83 kg (183 lb)   Urine Albumin/Glucose    Dilation/Effacement/Station    Vaginal Drainage    Draining Fluid No   Edema    LLE Edema None   RLE Edema None   Facial Edema None              Gen: no acute distress, nonlabored breathing.  OB exam completed: fundal height, +FHT.  Urine: -/-    A/P:  #1. 30w5d GESTATION  Labor precautions reviewed  Fetal kick counts reviewed  Third Tri labs reviewed.   Yellow packet given and consents signed. Pt does not refuse blood or blood products but would desire other alteratives prior to transfusion if available.   Breast pump: ordered  Pediatrician: order entered      RTC in 2 weeks    Cookie Zaidi PA-C  2024  10:12 AM

## 2024-02-21 ENCOUNTER — TELEPHONE (OUTPATIENT)
Dept: OBGYN CLINIC | Facility: CLINIC | Age: 24
End: 2024-02-21

## 2024-02-21 ENCOUNTER — NURSE TRIAGE (OUTPATIENT)
Age: 24
End: 2024-02-21

## 2024-02-21 NOTE — TELEPHONE ENCOUNTER
Note routed to practice to outreach to pt regarding breast pump information.      Regarding: Breast Pump  ----- Message from Meghana Foreman sent at 2/21/2024  1:55 PM EST -----  Patient called stating she had her appointment yesterday and spoke about getting a breast pump through insurance. Patient never received the information about the pump and would like to order a breast pump. Please advise patient.

## 2024-02-21 NOTE — TELEPHONE ENCOUNTER
[employer] message sent to patient re: order for breast pump.  Order for same placed thru [employer].

## 2024-03-05 ENCOUNTER — ROUTINE PRENATAL (OUTPATIENT)
Dept: OBGYN CLINIC | Facility: CLINIC | Age: 24
End: 2024-03-05
Payer: COMMERCIAL

## 2024-03-05 VITALS — WEIGHT: 186 LBS | DIASTOLIC BLOOD PRESSURE: 72 MMHG | BODY MASS INDEX: 34.02 KG/M2 | SYSTOLIC BLOOD PRESSURE: 112 MMHG

## 2024-03-05 DIAGNOSIS — Z34.93 PRENATAL CARE IN THIRD TRIMESTER: Primary | ICD-10-CM

## 2024-03-05 DIAGNOSIS — Z3A.28 28 WEEKS GESTATION OF PREGNANCY: ICD-10-CM

## 2024-03-05 PROCEDURE — 99213 OFFICE O/P EST LOW 20 MIN: CPT | Performed by: OBSTETRICS & GYNECOLOGY

## 2024-03-05 NOTE — LETTER
Date: 3/5/2024    To whom it may concern:     This is to certify that Lizeth Birch has been under my care for the following diagnosis: and is closer to the later part of her pregnancy.  We do not recommend travel to [place] and would not want proceedings interrupted for labor or prenatal care.      I feel that Lizeth Birch is unable to serve on Jury Duty at this time for the above mentioned medical reasons.      Sincerely,  Karine Lindo MD

## 2024-03-05 NOTE — PROGRESS NOTES
Patient reports good fm, no n/v, headache, bleeding, loss of fluid, dom violence, or smoking.  marine pnv mild cramping and edema urine negative negative   Given letter for excuse from [employer] jury duty.  -Declined Tdap  -Normal third trimester labs  -Already has yellow packet  -[place] from breast pump company  -Choosing pediatrician

## 2024-03-17 NOTE — PROGRESS NOTES
Lizeth is a 23 y.o.  34w5d. Reports ++FM, no LOF, VB, or regular contractions.     Vitals:    24 0900   BP: 108/74     S=D  +FHTs    A/P:  Varicella not immune  Rh status POS  Third tri labs completed    Contraception: NA  Breastfeeding: breast, has pump ordered will be delivered once he's born  Birth plan: no updates today     Discussed pre-term labor precautions  Return to office in 2 weeks

## 2024-03-18 ENCOUNTER — TELEPHONE (OUTPATIENT)
Age: 24
End: 2024-03-18

## 2024-03-18 NOTE — TELEPHONE ENCOUNTER
Pediatric Referral from an OB Practice:    Is this the patients first baby?: NO    Is it important for the pediatrician to have evening/weekend hours?: WOULD BE NICE BUT NOT A REQUIREMENT    Is it important that the pediatrician speaks a language other than English?: NO     Do you prefer the idea of a small practice or a larger practice? NO PREFERENCE    Pediatric practice chosen: [facility]

## 2024-03-19 ENCOUNTER — ROUTINE PRENATAL (OUTPATIENT)
Dept: OBGYN CLINIC | Facility: CLINIC | Age: 24
End: 2024-03-19
Payer: COMMERCIAL

## 2024-03-19 VITALS — BODY MASS INDEX: 34.75 KG/M2 | WEIGHT: 190 LBS | DIASTOLIC BLOOD PRESSURE: 74 MMHG | SYSTOLIC BLOOD PRESSURE: 108 MMHG

## 2024-03-19 DIAGNOSIS — Z3A.34 34 WEEKS GESTATION OF PREGNANCY: Primary | ICD-10-CM

## 2024-03-19 DIAGNOSIS — Z34.93 PRENATAL CARE IN THIRD TRIMESTER: ICD-10-CM

## 2024-03-19 PROCEDURE — 99213 OFFICE O/P EST LOW 20 MIN: CPT | Performed by: STUDENT IN AN ORGANIZED HEALTH CARE EDUCATION/TRAINING PROGRAM

## 2024-03-22 ENCOUNTER — NURSE TRIAGE (OUTPATIENT)
Age: 24
End: 2024-03-22

## 2024-03-22 ENCOUNTER — HOSPITAL ENCOUNTER (OUTPATIENT)
Facility: HOSPITAL | Age: 24
Discharge: HOME | End: 2024-03-22
Attending: OBSTETRICS & GYNECOLOGY | Admitting: OBSTETRICS & GYNECOLOGY
Payer: COMMERCIAL

## 2024-03-22 VITALS
SYSTOLIC BLOOD PRESSURE: 120 MMHG | HEART RATE: 108 BPM | TEMPERATURE: 98 F | RESPIRATION RATE: 18 BRPM | DIASTOLIC BLOOD PRESSURE: 70 MMHG

## 2024-03-22 PROBLEM — O36.8190 DECREASED FETAL MOVEMENT: Status: ACTIVE | Noted: 2024-03-22

## 2024-03-22 PROBLEM — Z3A.35 35 WEEKS GESTATION OF PREGNANCY: Status: ACTIVE | Noted: 2023-12-07

## 2024-03-22 PROCEDURE — 59025 FETAL NON-STRESS TEST: CPT

## 2024-03-22 PROCEDURE — 76815 OB US LIMITED FETUS(S): CPT

## 2024-03-22 PROCEDURE — 76815 OB US LIMITED FETUS(S): CPT | Performed by: OBSTETRICS & GYNECOLOGY

## 2024-03-22 PROCEDURE — 99214 OFFICE O/P EST MOD 30 MIN: CPT

## 2024-03-22 NOTE — PROGRESS NOTES
L&D Triage Note - OB/GYN  Lizeth Birch 23 y.o. female MRN: [id number]  Unit/Bed#:  TRIAGE  Encounter: [id number]      ASSESSMENT:    Lizeth Birch is a 23 y.o.  at 35w1d  who was evaluated today for decreased fetal movement. ANGELINA was found to be within normal limits however on NST she was noted to have multiple variable decelerations without provocation.   Recommendation was therefore made for patient to stay for extended fetal monitoring to which she is amenable    PLAN:    1) Final disposition per night team.   - Case discussed with Dr. Krishna    SUBJECTIVE:    Lizeth Birch 23 y.o.  at 35w1d with an Estimated Date of Delivery: 24 who presented to labor and delivery with a chief complaint of decreased fetal movement throughout the day.  Patient states that she has had only felt baby move about 2 times.  Following presentation to labor and delivery she was beginning to appreciate more fetal movement consistent with what she normally feels.  Patient does endorse some occasional lower abdominal cramping but nothing too painful or too consistent.  Patient denies any vaginal bleeding or loss of fluid.   Patient denies any fall or other trauma to the abdomen.   Patient's past obstetrical history is significant for 1 spontaneous AB.  This pregnancy has been uncomplicated thus far.    OBJECTIVE:    Vitals:    24 1451   BP: 120/70   Pulse: (!) 108   Resp: 18   Temp: 98 °F (36.7 °C)       ROS:  Constitutional: Negative  Respiratory: Negative  Cardiovascular: Negative    Gastrointestinal: Negative    General Physical Exam:  General: in no apparent distress, non-toxic, and alert  Cardiovascular: Cor RRR  Lungs: non-labored breathing  Abdomen: abdomen is soft without significant tenderness, masses, organomegaly or guarding  Lower extremeties: nontender    Cervical Exam    SVE: 0.5 / 50% / -3    Fetal monitoring:  FHT:  150 bpm/ Moderate 6 - 25 bpm / 15 x 15 accelerations present,  occasional decelerations, surrounded by overall reassuring tracing however warranting extended fetal monitoring  [place]: contractions noted at irregular intervals, uterine irritability is present     Imaging:          Abd. US   ANGELINA      - Q1 3.80cm     - Q2 3.90cm     - Q3 1.97cm     - Q4 4.88cm     - Total: 14.55cm   Placenta: posterior   Presentation: vertex    Esther Patten MD,  OBGYN PGY-2  3/22/2024 4:11 PM

## 2024-03-22 NOTE — PROCEDURES
Lizeth Birch, a  at 35w1d with an VINNY of 2024, by Ultrasound, was seen at [facility] for the following procedure(s):  ]

## 2024-03-22 NOTE — PROGRESS NOTES
L&D Triage Note - OB/GYN  Lizeth Birch 23 y.o. female MRN: [id number]  Unit/Bed#: LD TRIAGE  Encounter: [id number]      ASSESSMENT/PLAN  Lizeth Birch is a 23 y.o.  at 35w1d presenting for decreased fetal movement      1) Decreased fetal movement  - Extended fetal monitoring:   - ANGELINA:   - Pt endorsing positive fetal movement spontaneously.       2)  Discharge instructions  - Patient instructed to call if experiencing worsening contractions, vaginal bleeding, loss of fluid or decreased fetal movement.  - Will follow up with OBGYN in office      She is a patient of [facility]   D/w Dr. Krishna, on call OBGYN Attending Physician  ______________    SUBJECTIVE    VINNY: Estimated Date of Delivery: 24    HPI:  23 y.o.  35w1d presents with complaint of decreased fetal movement since 830 AM thyis morning. Subsequently then felt movement around 1130 AM and then decreased movement again until about 3PM, to which she felt movement again.  Currently, in triage, the pt is endorsing feeling positive fetal movement again spontaneously without any intervention.     Contractions: no  Leakage of fluid: no  Vaginal Bleeding: no  Fetal movement: present earlier no now yes in triage.     Her obstetrical history is significant for Declining TDAP.     ROS:  Constitutional: Negative  Respiratory: Negative  Cardiovascular: Negative    Gastrointestinal: Negative    Physical Exam  General: Well appearing, no distress  Respiratory: Unlabored breathing  Cardiovascular: Regular rate  Abdomen: Soft, gravid, nontender   ***Speculum Exam: ***  KOH/Wet [place]:      Infection:   - *** clue cells    - *** hyphae   - *** trichomonads present     Membrane status   - *** ferning   - *** nitrazene   - *** pooling   Fundal Height: Appropriate for gestational age.  Extremities: Warm and well perfused.  Non tender.      OBJECTIVE:  /70 (BP Location: Left arm)   Pulse (!) 108   Temp 98 °F (36.7 °C) (Oral)   Resp 18    "LMP 07/05/2023 (Exact Date)   There is no height or weight on file to calculate BMI.  Labs: No results found for this or any previous visit (from the past 24 hour(s)).      SVE:       FHT: present. Moderate variability category 1        TOCO:        IMAGING:       TVUS   Cervical length         - ***cm         - ***cm         - ***cm   Presentation: ***        TAUS   ANGELINA      - Q1 ***cm     - Q2 ***cm     - Q3 ***cm     - Q4 ***cm     - Total: ***cm   Placenta: ***   Presentation: ***      Warren Jordan DO  OB/GYN   3/22/2024  3:13 PM      Portions of the record may have been created with voice recognition software.  Occasional wrong word or \"sound a like\" substitutions may have occurred due to the inherent limitations of voice recognition software.  Read the chart carefully and recognize, using context, where substitutions have occurred    "

## 2024-03-22 NOTE — PROGRESS NOTES
Strip review with Dr. Krishna. Strip noted to be reactive and reassuring.   FHT:  Baseline of 150/moderate variability/15 x 15 accels present/no decelerations.    Labor return precautions given.     Esther Patten MD  OBGYN PGY-2  3/22/2024 5:05 PM

## 2024-03-22 NOTE — TELEPHONE ENCOUNTER
"Pt called into the office reporting decreased fetal movement today. Pt reports she has only felt baby move twice today. She has tried relaxing, eating/drinking and it has not helped. Denies abdominal pain, vaginal bleeding or leakage of fluid.      Reason for Disposition   Pregnant 23 or more weeks and baby moving less today by kick count (e.g., kick count < 5 in 1 hour or < 10 in 2 hours)    Answer Assessment - Initial Assessment Questions  1. FETAL MOVEMENT: \"Has the baby's movement decreased or changed significantly from normal?\" (e.g., yes, no; describe) \"When was the last time you felt the baby move?\" (e.g., minutes, hours)      Decreased, felt two movements today  2. VINNY: \"What date are you expecting to deliver?\"       4/25/24  3. PREGNANCY: \"How many weeks pregnant are you?\"       35w1d  4. OTHER SYMPTOMS: \"Do you have any other symptoms?\" (e.g., abdominal pain, fever, leaking fluid from vagina, vaginal bleeding, widespread itching, etc.)      denies    Protocols used: Pregnancy - Decreased or Abnormal Fetal Movement-ADULT-OH    "

## 2024-03-22 NOTE — DISCHARGE INSTRUCTIONS
Pregnancy at 35 to 38 Weeks   WHAT YOU NEED TO KNOW:   What changes are happening with my body?  You are considered full term at the beginning of 37 weeks. Your breathing may be easier if your baby has moved down into a head-down position. You may need to urinate more often because the baby may be pressing on your bladder. You may also feel more discomfort and get tired easily.  How do I care for myself at this stage of my pregnancy?       Eat a variety of healthy foods.  Healthy foods include fruits, vegetables, whole-grain breads, low-fat dairy foods, beans, lean meats, and fish. Drink liquids as directed. Ask how much liquid to drink each day and which liquids are best for you. Limit caffeine to less than 200 milligrams each day. Limit your intake of fish to 2 servings each week. Choose fish low in mercury such as canned light tuna, shrimp, salmon, cod, or tilapia. Do not  eat fish high in mercury such as swordfish, tilefish, laura mackerel, and shark.         Take prenatal vitamins as directed.  Your need for certain vitamins and minerals, such as folic acid, increases during pregnancy. Prenatal vitamins provide some of the extra vitamins and minerals you need. Prenatal vitamins may also help to decrease the risk of certain birth defects.         Rest as needed.  Put your feet up if you have swelling in your ankles and feet.         Talk to your healthcare provider about exercise.  Moderate exercise can help you stay fit. Your healthcare provider will help you plan an exercise program that is safe for you during pregnancy.         Do not smoke.  Smoking increases your risk of a miscarriage and other health problems during your pregnancy. Smoking can cause your baby to be born early or weigh less at birth. Ask your healthcare provider for information if you need help quitting.    Do not drink alcohol.  Alcohol passes from your body to your baby through the placenta. It can affect your baby's brain development and  cause fetal alcohol syndrome (FAS). FAS is a group of conditions that causes mental, behavior, and growth problems.    Talk to your healthcare provider before you take any medicines.  Many medicines may harm your baby if you take them when you are pregnant. Do not take any medicines, vitamins, herbs, or supplements without first talking to your healthcare provider. Never use illegal or street drugs (such as marijuana or cocaine) while you are pregnant.    What are some safety tips during pregnancy?   Avoid hot tubs and saunas.  Do not use a hot tub or sauna while you are pregnant, especially during your first trimester. Hot tubs and saunas may raise your baby's temperature and increase the risk of birth defects.    Avoid toxoplasmosis.  This is an infection caused by eating raw meat or being around infected cat feces. It can cause birth defects, miscarriages, and other problems. Wash your hands after you touch raw meat. Make sure any meat is well-cooked before you eat it. Avoid raw eggs and unpasteurized milk. Use gloves or ask someone else to clean your cat's litter box while you are pregnant.         Ask your healthcare provider about travel.  The most comfortable time to travel is during the second trimester. Ask your provider if you can travel after 36 weeks. You may not be able to travel in an airplane after 36 weeks. He or she may also recommend you avoid long road trips.    What changes are happening with my baby?  By 38 weeks, your baby may weigh between 6 and 9 pounds. Your baby may be about 14 inches long from the top of the head to the rump (baby's bottom). Your baby hears well enough to know your voice. As your baby gets larger, you may feel fewer kicks and more stretching and rolling. Your baby may move into a head-down position. Your baby will also rest lower in your abdomen.  What do I need to know about prenatal care?  Your healthcare provider will check your blood pressure and weight. You may also  need the following:  A urine test  may also be done to check for sugar and protein. These can be signs of gestational diabetes or infection. Protein in your urine may also be a sign of preeclampsia. Preeclampsia is a condition that can develop during week 20 or later of your pregnancy. It causes high blood pressure, and it can cause problems with your kidneys and other organs.    A gestational diabetes screen  may be done. Your healthcare provider may order either a 1-step or 2-step oral glucose tolerance test (OGTT).     1-step OGTT:  Your blood sugar level will be tested after you have not eaten for 8 hours (fasting). You will then be given a glucose drink. Your level will be tested again 1 hour and 2 hours after you finish the drink.    2-step OGTT:  You do not have to fast for the first part of the test. You will have the glucose drink at any time of day. Your blood sugar level will be checked 1 hour later. If your blood sugar is higher than a certain level, another test will be ordered. You will fast and your blood sugar level will be tested. You will have the glucose drink. Your blood will be tested again 1 hour, 2 hours, and 3 hours after you finish the glucose drink.    A blood test  may be done to check for anemia (low iron level).    A Tdap vaccine  may be recommended by your healthcare provider.    A group B strep test  is a test that is done to check for group B strep infection. Group B strep is a type of bacteria that may be found in the vagina or rectum. It can be passed to your baby during delivery if you have it. Your healthcare provider will take swab your vagina or rectum and send the sample to the lab for tests.    Fundal height  is a measurement of your uterus to check your baby's growth. This number is usually the same as the number of weeks that you have been pregnant. Your healthcare provider may also check your baby's position.    Your baby's heart rate  will be checked.    When should I seek  immediate care?   You develop a severe headache that does not go away.    You have new or increased vision changes, such as blurred or spotted vision.    You have new or increased swelling in your face or hands.    You have vaginal spotting or bleeding.    Your water broke or you feel warm water gushing or trickling from your vagina.    When should I call my obstetrician?   You have more than 5 contractions in 1 hour.    You notice any changes in your baby's movements.    You have abdominal cramps, pressure, or tightening.    You have a change in vaginal discharge.    You have chills or a fever.    You have vaginal itching, burning, or pain.    You have yellow, green, white, or foul-smelling vaginal discharge.    You have pain or burning when you urinate, less urine than usual, or pink or bloody urine.    You have questions or concerns about your condition or care.    CARE AGREEMENT:   You have the right to help plan your care. Learn about your health condition and how it may be treated. Discuss treatment options with your healthcare providers to decide what care you want to receive. You always have the right to refuse treatment. The above information is an  only. It is not intended as medical advice for individual conditions or treatments. Talk to your doctor, nurse or pharmacist before following any medical regimen to see if it is safe and effective for you.  © Copyright [employer] 2022 Information is for End User's use only and may not be sold, redistributed or otherwise used for commercial purposes. All illustrations and images included in CareNotes® are the copyrighted property of [employer] or [employer]

## 2024-03-22 NOTE — PROCEDURES
Lizeth Birch, a  at 35w1d with an VINNY of 2024, by Ultrasound, was seen at [facility] for the following procedure(s): $Procedure Type: ANGELINA, NST]    Nonstress Test  Reason for NST: Decreased Fetal Movement  Variability: Moderate  Decelerations: Variable  Accelerations: Yes  Acoustic Stimulator: No  Baseline: 150 BPM  Uterine Irritability: Yes  Contractions: Irregular    4 Quadrant ANGELINA  ANGELINA Q1 (cm): 3.8 cm  ANGELINA Q2 (cm): 3.9 cm  ANGELINA Q3 (cm): 2 cm  ANGELINA Q4 (cm): 4.9 cm  ANGELINA TOTAL (cm): 14.6 cm              Interpretation  Nonstress Test Interpretation: Reactive  Overall Impression: (!) Non-reassuring  Comments: Requiring extended fetal monitoring                Esther Patten MD  OBGYN PGY-2  3/22/2024 4:12 PM

## 2024-04-02 ENCOUNTER — ROUTINE PRENATAL (OUTPATIENT)
Dept: OBGYN CLINIC | Facility: CLINIC | Age: 24
End: 2024-04-02
Payer: COMMERCIAL

## 2024-04-02 VITALS
HEIGHT: 62 IN | BODY MASS INDEX: 35.15 KG/M2 | WEIGHT: 191 LBS | SYSTOLIC BLOOD PRESSURE: 122 MMHG | DIASTOLIC BLOOD PRESSURE: 80 MMHG

## 2024-04-02 DIAGNOSIS — A49.1 GROUP B STREPTOCOCCAL INFECTION: ICD-10-CM

## 2024-04-02 DIAGNOSIS — Z34.93 THIRD TRIMESTER PREGNANCY: Primary | ICD-10-CM

## 2024-04-02 DIAGNOSIS — Z3A.35 35 WEEKS GESTATION OF PREGNANCY: ICD-10-CM

## 2024-04-02 PROCEDURE — G9920 SCRNING PERF AND NEGATIVE: HCPCS | Performed by: PHYSICIAN ASSISTANT

## 2024-04-02 PROCEDURE — 87150 DNA/RNA AMPLIFIED PROBE: CPT | Performed by: PHYSICIAN ASSISTANT

## 2024-04-02 PROCEDURE — 99212 OFFICE O/P EST SF 10 MIN: CPT | Performed by: PHYSICIAN ASSISTANT

## 2024-04-04 LAB — GP B STREP DNA SPEC QL NAA+PROBE: NEGATIVE

## 2024-04-05 NOTE — PROGRESS NOTES
VISIT 23 yr old  at 37w4d: (+) n - comes and goes; (+) HA - tension like that comes and goes; takes tylenol if needed; (+) cramping - irregular tightening; (+) edema - hands/feet; Denies v/vb/lof/dv/smoking; urine neg/neg; GBS neg; vtx  Labs up to date; PNC - no further ultrasounds scheduled;  PNVs + DHA - tolerating daily  Good FM - r/christel 10 kicks/2 hrs  Cx: /-2     Encouraged hydration.   Reviewed signs and symptoms of labor and when to call; Reviewed signs and symptoms of pregnancy induced hypertension or preeclampsia and when to call  Planning on epidural; Patient has a car seat - advise installation in car at this time. Encourage patient to pack her bags.  RTO in 1 weeks for routine ob check or sooner if needed

## 2024-04-08 ENCOUNTER — ROUTINE PRENATAL (OUTPATIENT)
Dept: OBGYN CLINIC | Facility: CLINIC | Age: 24
End: 2024-04-08
Payer: COMMERCIAL

## 2024-04-08 VITALS
SYSTOLIC BLOOD PRESSURE: 108 MMHG | BODY MASS INDEX: 35.51 KG/M2 | DIASTOLIC BLOOD PRESSURE: 64 MMHG | HEIGHT: 62 IN | WEIGHT: 193 LBS

## 2024-04-08 DIAGNOSIS — Z34.03 ENCOUNTER FOR SUPERVISION OF NORMAL FIRST PREGNANCY IN THIRD TRIMESTER: Primary | ICD-10-CM

## 2024-04-08 PROCEDURE — 99213 OFFICE O/P EST LOW 20 MIN: CPT | Performed by: PHYSICIAN ASSISTANT

## 2024-04-11 ENCOUNTER — HOSPITAL ENCOUNTER (OUTPATIENT)
Facility: HOSPITAL | Age: 24
Discharge: HOME | End: 2024-04-11
Attending: STUDENT IN AN ORGANIZED HEALTH CARE EDUCATION/TRAINING PROGRAM | Admitting: STUDENT IN AN ORGANIZED HEALTH CARE EDUCATION/TRAINING PROGRAM
Payer: COMMERCIAL

## 2024-04-11 ENCOUNTER — NURSE TRIAGE (OUTPATIENT)
Age: 24
End: 2024-04-11

## 2024-04-11 VITALS
WEIGHT: 193 LBS | HEIGHT: 62 IN | SYSTOLIC BLOOD PRESSURE: 124 MMHG | RESPIRATION RATE: 18 BRPM | HEART RATE: 93 BPM | BODY MASS INDEX: 35.51 KG/M2 | TEMPERATURE: 98 F | DIASTOLIC BLOOD PRESSURE: 78 MMHG

## 2024-04-11 PROBLEM — R42 DIZZY: Status: ACTIVE | Noted: 2024-04-11

## 2024-04-11 LAB
ANION GAP SERPL CALCULATED.3IONS-SCNC: 8 MMOL/L (ref 4–13)
BASOPHILS # BLD AUTO: 0.02 THOUSANDS/ÂΜL (ref 0–0.1)
BASOPHILS NFR BLD AUTO: 0 % (ref 0–1)
BUN SERPL-MCNC: 7 MG/DL (ref 5–25)
CALCIUM SERPL-MCNC: 8.7 MG/DL (ref 8.4–10.2)
CARDIAC TROPONIN I PNL SERPL HS: <2 NG/L
CHLORIDE SERPL-SCNC: 106 MMOL/L (ref 96–108)
CO2 SERPL-SCNC: 21 MMOL/L (ref 21–32)
CREAT SERPL-MCNC: 0.63 MG/DL (ref 0.6–1.3)
EOSINOPHIL # BLD AUTO: 0.1 THOUSAND/ÂΜL (ref 0–0.61)
EOSINOPHIL NFR BLD AUTO: 1 % (ref 0–6)
ERYTHROCYTE [DISTWIDTH] IN BLOOD BY AUTOMATED COUNT: 12.9 % (ref 11.6–15.1)
GFR SERPL CREATININE-BSD FRML MDRD: 125 ML/MIN/1.73SQ M
GLUCOSE SERPL-MCNC: 127 MG/DL (ref 65–140)
HCT VFR BLD AUTO: 35.7 % (ref 34.8–46.1)
HGB BLD-MCNC: 11.5 G/DL (ref 11.5–15.4)
IMM GRANULOCYTES # BLD AUTO: 0.1 THOUSAND/UL (ref 0–0.2)
IMM GRANULOCYTES NFR BLD AUTO: 1 % (ref 0–2)
LYMPHOCYTES # BLD AUTO: 1.58 THOUSANDS/ÂΜL (ref 0.6–4.47)
LYMPHOCYTES NFR BLD AUTO: 20 % (ref 14–44)
MCH RBC QN AUTO: 28.5 PG (ref 26.8–34.3)
MCHC RBC AUTO-ENTMCNC: 32.2 G/DL (ref 31.4–37.4)
MCV RBC AUTO: 89 FL (ref 82–98)
MONOCYTES # BLD AUTO: 0.66 THOUSAND/ÂΜL (ref 0.17–1.22)
MONOCYTES NFR BLD AUTO: 8 % (ref 4–12)
NEUTROPHILS # BLD AUTO: 5.6 THOUSANDS/ÂΜL (ref 1.85–7.62)
NEUTS SEG NFR BLD AUTO: 70 % (ref 43–75)
NRBC BLD AUTO-RTO: 0 /100 WBCS
PLATELET # BLD AUTO: 200 THOUSANDS/UL (ref 149–390)
PMV BLD AUTO: 10.9 FL (ref 8.9–12.7)
POTASSIUM SERPL-SCNC: 4.1 MMOL/L (ref 3.5–5.3)
RBC # BLD AUTO: 4.03 MILLION/UL (ref 3.81–5.12)
SODIUM SERPL-SCNC: 135 MMOL/L (ref 135–147)
TSH SERPL DL<=0.05 MIU/L-ACNC: 0.67 UIU/ML (ref 0.45–4.5)
WBC # BLD AUTO: 8.06 THOUSAND/UL (ref 4.31–10.16)

## 2024-04-11 PROCEDURE — 84484 ASSAY OF TROPONIN QUANT: CPT

## 2024-04-11 PROCEDURE — 99214 OFFICE O/P EST MOD 30 MIN: CPT

## 2024-04-11 PROCEDURE — 80048 BASIC METABOLIC PNL TOTAL CA: CPT

## 2024-04-11 PROCEDURE — 93005 ELECTROCARDIOGRAM TRACING: CPT

## 2024-04-11 PROCEDURE — 85025 COMPLETE CBC W/AUTO DIFF WBC: CPT

## 2024-04-11 PROCEDURE — 84443 ASSAY THYROID STIM HORMONE: CPT

## 2024-04-11 PROCEDURE — 99214 OFFICE O/P EST MOD 30 MIN: CPT | Performed by: STUDENT IN AN ORGANIZED HEALTH CARE EDUCATION/TRAINING PROGRAM

## 2024-04-11 RX ADMIN — SODIUM CHLORIDE, SODIUM LACTATE, POTASSIUM CHLORIDE, AND CALCIUM CHLORIDE 1000 ML: .6; .31; .03; .02 INJECTION, SOLUTION INTRAVENOUS at 15:00

## 2024-04-11 NOTE — PROGRESS NOTES
Repeat EKG sinus tachycardia. Patient reports her dizziness has resolved. She is not interested in staying for repeat troponins.     She declines meclizine.     Discussed return precautions including vaginal bleeding, leaking of fluid, decreased fetal movement, or frequent contractions.    Discussed calling for return of symptoms.    Patient discussed with Dr. Gillespie.    Karen Jacques MD   PGY-I, OBGYN  4/11/2024  5:34 PM

## 2024-04-11 NOTE — TELEPHONE ENCOUNTER
"Reason for Disposition  • SEVERE dizziness (e.g., unable to stand, requires support to walk, feels like passing out now)    Answer Assessment - Initial Assessment Questions  1. DESCRIPTION: \"Describe your dizziness.\"      Episode of dizziness last 1 hour  2. LIGHTHEADED: \"Do you feel lightheaded?\" (e.g., somewhat faint, woozy, weak upon standing)      Feeling overheated, somewhat woozy  3. VERTIGO: \"Do you feel like either you or the room is spinning or tilting?\" (i.e. vertigo)      Denies  4. SEVERITY: \"How bad is it?\"  \"Do you feel like you are going to faint?\" \"Can you stand and walk?\"    - MILD: Feels slightly dizzy, but walking normally.    - MODERATE: Feels very unsteady when walking, but not falling; interferes with normal activities (e.g., school, work) .    - SEVERE: Unable to walk without falling, or requires assistance to walk without falling; feels like passing out now.       Moderate  5. ONSET:  \"When did the dizziness begin?\"      An hour ago  6. AGGRAVATING FACTORS: \"Does anything make it worse?\" (e.g., standing, change in head position)      Denies  7. HEART RATE: \"Can you tell me your heart rate?\" \"How many beats in 15 seconds?\"  (Note: not all patients can do this)        Denies  8. CAUSE: \"What do you think is causing the dizziness?\"      Denies  9. RECURRENT SYMPTOM: \"Have you had dizziness before?\" If Yes, ask: \"When was the last time?\" \"What happened that time?\"      Had these episodes in first trimester; went away with 2nd trimester; now retunred  10. OTHER SYMPTOMS: \"Do you have any other symptoms?\" (e.g., fever, chest pain, vomiting, diarrhea, bleeding)        Last night felt huang henriquez, coming and going  11. PREGNANCY: \"Is there any chance you are pregnant?\" \"When was your last menstrual period?\"        38weeks    Protocols used: Dizziness-ADULT-OH    "

## 2024-04-11 NOTE — TELEPHONE ENCOUNTER
Patient is 38 weeks pregnant, calling with onset of feeling disoriented and unwell, overheated along with dizziness. Denies vertigo s/s,  denies vaginal bleeding. Denies SOB, denies chest tightness, denies heart palpitations. Last night and this morning some [place] henriquez which come and go. Had these episodes of dizziness in first trimester. Went away during 2nd trimester,  now having another episode. Was out getting nails done when this happened, symptoms started about an hour ago. She is now sitting in her car with air conditioning on. Denies hx of gestational diabetes, ate and drank today. Message sent to ob on call for further recommendations.

## 2024-04-11 NOTE — PROGRESS NOTES
"L&D Triage Note - OB/GYN  Lizeth Birch 23 y.o. female MRN: [id number]  Unit/Bed#:  TRIAGE  Encounter: [id number]    Patient is seen by [facility]    ASSESSMENT  Lizeth Birch is a 23 y.o.  at 38w0d presenting with feeling \"off\". She felt hot, sweaty, and felt like her vision went black prior to feeling light headed. She is eating and drinking without issue.     PLAN  #1. Lightheaded/Dizzy:   CBC and BMP wnl  TSH wnl  Orthostatic vitals wnl  EKG: sinus tachycardia   0 hr troponin within normal limits  2hr troponin level to be drawn  Repeat EKG sinus tachycardia    Patient signed out to night team pending troponins.     D/w Dr. Gillespie  ______________    SUBJECTIVE    VINNY: Estimated Date of Delivery: 24    HPI:  23 y.o.  38w0d presents with complaint of lightheadedness. She was getting her nails done when she started to feel hot and sweaty. She feels like her vision went black, and then she feels like she continues to feel dizzy and lightheaded. She reports \"just not feeling right.\" She is eating/drinking without issue. She has eaten today. Denies chest pain, SOB, palpitations, nausea, vomiting, dysuria, hematuria.     Contractions: denies  Leakage of fluid: denies  Vaginal Bleeding: denies  Fetal movement: present    Her obstetrical history is noncontributory    ROS:  Constitutional: Lightheaded/dizzy  Respiratory: Negative  Cardiovascular: Negative    Gastrointestinal: Negative    OBJECTIVE:    Vitals:  /78 (BP Location: Left arm)   Pulse 93   Temp 98 °F (36.7 °C)   Resp 18   Ht 5' 2\" (1.575 m)   Wt 87.5 kg (193 lb)   LMP 2023 (Exact Date)   BMI 35.30 kg/m²   Body mass index is 35.3 kg/m².    Physical Exam  Vitals reviewed. Exam conducted with a chaperone present.   Constitutional:       General: She is not in acute distress.  HENT:      Head: Normocephalic.      Mouth/Throat:      Pharynx: Oropharynx is clear.   Eyes:      General: No scleral icterus.     " Conjunctiva/sclera: Conjunctivae normal.   Cardiovascular:      Rate and Rhythm: Normal rate and regular rhythm.      Heart sounds: Normal heart sounds.   Pulmonary:      Effort: Pulmonary effort is normal. No respiratory distress.      Breath sounds: Normal breath sounds.   Abdominal:      Palpations: Abdomen is soft.      Tenderness: There is no abdominal tenderness. There is no guarding.   Musculoskeletal:         General: No tenderness.      Right lower leg: No edema.      Left lower leg: No edema.   Skin:     General: Skin is warm and dry.      Coloration: Skin is not jaundiced.   Neurological:      Mental Status: She is alert.   Psychiatric:         Mood and Affect: Mood normal.         Behavior: Behavior normal.       SVE:  1/50/-2    FHT:  Baseline Rate (FHR): 145 bpm  Variability: Moderate (period of minimal in begining)  Accelerations: 15 x 15 or greater  Decelerations: None  FHR Category: Category I    TOCO:   Irritability present    Labs:   Recent Results (from the past 24 hour(s))   CBC and differential    Collection Time: 04/11/24  2:59 PM   Result Value Ref Range    WBC 8.06 4.31 - 10.16 Thousand/uL    RBC 4.03 3.81 - 5.12 Million/uL    Hemoglobin 11.5 11.5 - 15.4 g/dL    Hematocrit 35.7 34.8 - 46.1 %    MCV 89 82 - 98 fL    MCH 28.5 26.8 - 34.3 pg    MCHC 32.2 31.4 - 37.4 g/dL    RDW 12.9 11.6 - 15.1 %    MPV 10.9 8.9 - 12.7 fL    Platelets 200 149 - 390 Thousands/uL    nRBC 0 /100 WBCs    Segmented % 70 43 - 75 %    Immature Grans % 1 0 - 2 %    Lymphocytes % 20 14 - 44 %    Monocytes % 8 4 - 12 %    Eosinophils Relative 1 0 - 6 %    Basophils Relative 0 0 - 1 %    Absolute Neutrophils 5.60 1.85 - 7.62 Thousands/µL    Absolute Immature Grans 0.10 0.00 - 0.20 Thousand/uL    Absolute Lymphocytes 1.58 0.60 - 4.47 Thousands/µL    Absolute Monocytes 0.66 0.17 - 1.22 Thousand/µL    Eosinophils Absolute 0.10 0.00 - 0.61 Thousand/µL    Basophils Absolute 0.02 0.00 - 0.10 Thousands/µL   TSH, 3rd generation  "with Free T4 reflex    Collection Time: 04/11/24  2:59 PM   Result Value Ref Range    TSH 3RD GENERATON 0.673 0.450 - 4.500 uIU/mL   Basic metabolic panel    Collection Time: 04/11/24  2:59 PM   Result Value Ref Range    Sodium 135 135 - 147 mmol/L    Potassium 4.1 3.5 - 5.3 mmol/L    Chloride 106 96 - 108 mmol/L    CO2 21 21 - 32 mmol/L    ANION GAP 8 4 - 13 mmol/L    BUN 7 5 - 25 mg/dL    Creatinine 0.63 0.60 - 1.30 mg/dL    Glucose 127 65 - 140 mg/dL    Calcium 8.7 8.4 - 10.2 mg/dL    eGFR 125 ml/min/1.73sq m   ECG 12 lead    Collection Time: 04/11/24  3:12 PM   Result Value Ref Range    Ventricular Rate 114 BPM    Atrial Rate 114 BPM    WY Interval 136 ms    QRSD Interval 62 ms    QT Interval 318 ms    QTC Interval 438 ms    P Axis 36 degrees    QRS Axis 48 degrees    T Wave [place] 3 degrees   HS Troponin 0hr (reflex protocol)    Collection Time: 04/11/24  3:51 PM   Result Value Ref Range    hs TnI 0hr <2 \"Refer to ACS Flowchart\"- see link ng/L           Karen Jacques MD  4/11/2024  4:41 PM      "

## 2024-04-11 NOTE — TELEPHONE ENCOUNTER
Per OB on call, if no SOB/chest tightness or heart palpitations, should come in to L&D for evaluation If disorientation and dizziness persists. RN placed a call to patient; patient states continues to feel disoriented. No SOB, chest tightness or heart palpitations. RN advised someone should come in to take her to L&D. Pt confirmed she had called manuelgiancarlo and he is on his way to get her. Will go to L&D for further Triage. RN notified L&D Charge.

## 2024-04-12 LAB
ATRIAL RATE: 101 BPM
ATRIAL RATE: 114 BPM
P AXIS: 36 DEGREES
P AXIS: 37 DEGREES
PR INTERVAL: 128 MS
PR INTERVAL: 136 MS
QRS AXIS: 48 DEGREES
QRS AXIS: 53 DEGREES
QRSD INTERVAL: 62 MS
QRSD INTERVAL: 72 MS
QT INTERVAL: 318 MS
QT INTERVAL: 346 MS
QTC INTERVAL: 438 MS
QTC INTERVAL: 448 MS
T WAVE AXIS: 29 DEGREES
T WAVE AXIS: 3 DEGREES
VENTRICULAR RATE: 101 BPM
VENTRICULAR RATE: 114 BPM

## 2024-04-12 PROCEDURE — 93010 ELECTROCARDIOGRAM REPORT: CPT | Performed by: INTERNAL MEDICINE

## 2024-04-16 ENCOUNTER — ROUTINE PRENATAL (OUTPATIENT)
Dept: OBGYN CLINIC | Facility: CLINIC | Age: 24
End: 2024-04-16
Payer: COMMERCIAL

## 2024-04-16 VITALS
HEIGHT: 62 IN | WEIGHT: 197.6 LBS | DIASTOLIC BLOOD PRESSURE: 76 MMHG | SYSTOLIC BLOOD PRESSURE: 122 MMHG | BODY MASS INDEX: 36.36 KG/M2

## 2024-04-16 DIAGNOSIS — Z34.93 THIRD TRIMESTER PREGNANCY: ICD-10-CM

## 2024-04-16 DIAGNOSIS — Z3A.38 38 WEEKS GESTATION OF PREGNANCY: Primary | ICD-10-CM

## 2024-04-16 PROCEDURE — 99213 OFFICE O/P EST LOW 20 MIN: CPT | Performed by: STUDENT IN AN ORGANIZED HEALTH CARE EDUCATION/TRAINING PROGRAM

## 2024-04-16 NOTE — PROGRESS NOTES
Lizeth is a 23 y.o.  38w5d. Reports ++FM, no LOF, VB, or regular contractions.     Vitals:    24 1600   BP: 122/76     S=D  +FHTs  Leopldds 7.5# and vertex    A/P:  Rh status POS  GBS NEG  Third tri labs wnl  Breastfeeding: will be delivered after delivery  Birth plan: discussed eIOL possible after 39 weeks, recommend IOL once hit 41 weeks  Will call if interested in scheduling  IOL    Discussed term labor precautions  Return to office in 1 weeks

## 2024-04-18 ENCOUNTER — NURSE TRIAGE (OUTPATIENT)
Age: 24
End: 2024-04-18

## 2024-04-18 ENCOUNTER — TELEPHONE (OUTPATIENT)
Age: 24
End: 2024-04-18

## 2024-04-18 NOTE — TELEPHONE ENCOUNTER
"Pt reports a large amount of yellow jelly like discharge when she wiped earlier. Has not had any further discharge since. Reports mild menstrual like cramps that have been coming and going, they are not time able. Denies any vaginal bleeding or leakage of fluid. Baby is active. Encouraged pt to continue to monitor and call back if symptoms worsen.     Reason for Disposition   Pregnant 37 or more weeks (i.e., term) and pinkish or brownish mucous discharge    Answer Assessment - Initial Assessment Questions  1. DISCHARGE: \"Describe the discharge.\" (e.g., white, yellow, green, gray, foamy, cottage cheese-like)      Yellow jelly like  2. ODOR: \"Is there a bad odor?\"      denies  3. ONSET: \"When did the discharge begin?\"      This morning  4. RASH: \"Is there a rash in that area?\" If Yes, ask: \"Describe it.\" (e.g., redness, blisters, sores, bumps)      denies  5. ABDOMINAL PAIN: \"Are you having any abdominal pain?\" If Yes, ask: \"What does it feel like?\" (e.g., crampy, dull, intermittent, constant)       Mild menstrual like cramps that come and go  6. ABDOMINAL PAIN SEVERITY: If present, ask: \"How bad is it?\"  (e.g., Scale 1-10; mild, moderate, or severe)    - MILD (1-3): doesn't interfere with normal activities, abdomen soft and not tender to touch     - MODERATE (4-7): interferes with normal activities or awakens from sleep, tender to touch     - SEVERE (8-10): excruciating pain, doubled over, unable to do any normal activities      mild  7. CAUSE: \"What do you think is causing the discharge?\"      unsure  8. OTHER SYMPTOMS: \"Do you have any other symptoms?\" (e.g., fever, itching, vaginal bleeding, pain with urination)      denies  9. VINNY: \"What date are you expecting to deliver?\"       4/25/24  10. PREGNANCY: \"How many weeks pregnant are you?\"        39w0d    Protocols used: Pregnancy - Vaginal Discharge-ADULT-[place]    "

## 2024-04-19 NOTE — TELEPHONE ENCOUNTER
Placed call to patient, reviewed eIOL scheduling details. Patient confirmed. Patient with appt previously scheduled 4/23/24, patient requesting appt Monday 4/22/24 prior to eIOL, offered and scheduled.

## 2024-04-19 NOTE — TELEPHONE ENCOUNTER
Spoke to GB at L&D. Patient is scheduled for eIOL 4/23/24 at 8pm.  Provider notified via staff message. Induction calendar updated. Pink sticky updated.

## 2024-04-22 ENCOUNTER — ROUTINE PRENATAL (OUTPATIENT)
Dept: OBGYN CLINIC | Facility: CLINIC | Age: 24
End: 2024-04-22
Payer: COMMERCIAL

## 2024-04-22 VITALS
SYSTOLIC BLOOD PRESSURE: 110 MMHG | HEIGHT: 62 IN | BODY MASS INDEX: 36.47 KG/M2 | WEIGHT: 198.2 LBS | DIASTOLIC BLOOD PRESSURE: 70 MMHG

## 2024-04-22 DIAGNOSIS — Z34.03 ENCOUNTER FOR SUPERVISION OF NORMAL FIRST PREGNANCY IN THIRD TRIMESTER: Primary | ICD-10-CM

## 2024-04-22 PROCEDURE — 99213 OFFICE O/P EST LOW 20 MIN: CPT | Performed by: PHYSICIAN ASSISTANT

## 2024-04-22 NOTE — PROGRESS NOTES
VISIT 23 yr old  at 39w4d: (+) cramping -  menstrual like - comes and goes; (+) edema - hands/feet that comes and goes; Denies n/v/HA/vb/lof/dv/smoking; urine neg/neg; GBS neg  Labs up to date; PNC - no further u/s scheduled;  PNVs + DHA - tolerating daily  Good FM - r/christel 10 kicks/2 hrs   Cervix - /50/-1    Encouraged hydration. Encouraged infection prevention/handwashing.  Reviewed signs and symptoms of labor and when to call; Reviewed signs and symptoms of pregnancy induced hypertension or preeclampsia and when to call  Scheduled for IOL for tomorrow night - all questions answered today  RTO for PP visits as needed s/p delivery

## 2024-04-23 ENCOUNTER — NURSE TRIAGE (OUTPATIENT)
Age: 24
End: 2024-04-23

## 2024-04-23 ENCOUNTER — ANESTHESIA (INPATIENT)
Dept: ANESTHESIOLOGY | Facility: HOSPITAL | Age: 24
DRG: 560 | End: 2024-04-23
Payer: COMMERCIAL

## 2024-04-23 ENCOUNTER — HOSPITAL ENCOUNTER (INPATIENT)
Facility: HOSPITAL | Age: 24
LOS: 2 days | Discharge: HOME | DRG: 560 | End: 2024-04-25
Attending: OBSTETRICS & GYNECOLOGY | Admitting: OBSTETRICS & GYNECOLOGY
Payer: COMMERCIAL

## 2024-04-23 ENCOUNTER — HOSPITAL ENCOUNTER (OUTPATIENT)
Dept: LABOR AND DELIVERY | Facility: HOSPITAL | Age: 24
Discharge: HOME | DRG: 560 | End: 2024-04-23
Payer: COMMERCIAL

## 2024-04-23 ENCOUNTER — ANESTHESIA EVENT (INPATIENT)
Dept: ANESTHESIOLOGY | Facility: HOSPITAL | Age: 24
DRG: 560 | End: 2024-04-23
Payer: COMMERCIAL

## 2024-04-23 PROBLEM — Z3A.39 39 WEEKS GESTATION OF PREGNANCY: Status: ACTIVE | Noted: 2024-04-23

## 2024-04-23 LAB
ABO GROUP BLD: NORMAL
BASE EXCESS BLDCOA CALC-SCNC: -3.4 MMOL/L (ref 3–11)
BASE EXCESS BLDCOV CALC-SCNC: -4.3 MMOL/L (ref 1–9)
BLD GP AB SCN SERPL QL: NEGATIVE
ERYTHROCYTE [DISTWIDTH] IN BLOOD BY AUTOMATED COUNT: 13.1 % (ref 11.6–15.1)
HCO3 BLDCOA-SCNC: 23.7 MMOL/L (ref 17.3–27.3)
HCO3 BLDCOV-SCNC: 20.5 MMOL/L (ref 12.2–28.6)
HCT VFR BLD AUTO: 37.5 % (ref 34.8–46.1)
HGB BLD-MCNC: 12.2 G/DL (ref 11.5–15.4)
HOLD SPECIMEN: NORMAL
MCH RBC QN AUTO: 28.8 PG (ref 26.8–34.3)
MCHC RBC AUTO-ENTMCNC: 32.5 G/DL (ref 31.4–37.4)
MCV RBC AUTO: 89 FL (ref 82–98)
OXYHGB MFR BLDCOA: 10.7 %
OXYHGB MFR BLDCOV: 75 %
PCO2 BLDCOA: 51.1 MM[HG] (ref 30–60)
PCO2 BLDCOV: 37 MM HG (ref 27–43)
PH BLDCOA: 7.28 [PH] (ref 7.23–7.43)
PH BLDCOV: 7.36 [PH] (ref 7.19–7.49)
PLATELET # BLD AUTO: 206 THOUSANDS/UL (ref 149–390)
PMV BLD AUTO: 11.5 FL (ref 8.9–12.7)
PO2 BLDCOA: <10 MM HG (ref 5–25)
PO2 BLDCOV: 32.9 MM HG (ref 15–45)
RBC # BLD AUTO: 4.23 MILLION/UL (ref 3.81–5.12)
RH BLD: POSITIVE
SAO2 % BLDCOV: 15.5 ML/DL
SPECIMEN EXPIRATION DATE: NORMAL
TREPONEMA PALLIDUM IGG+IGM AB [PRESENCE] IN SERUM OR PLASMA BY IMMUNOASSAY: NORMAL
WBC # BLD AUTO: 10.69 THOUSAND/UL (ref 4.31–10.16)

## 2024-04-23 PROCEDURE — 86901 BLOOD TYPING SEROLOGIC RH(D): CPT | Performed by: OBSTETRICS & GYNECOLOGY

## 2024-04-23 PROCEDURE — 86850 RBC ANTIBODY SCREEN: CPT | Performed by: OBSTETRICS & GYNECOLOGY

## 2024-04-23 PROCEDURE — 82805 BLOOD GASES W/O2 SATURATION: CPT | Performed by: OBSTETRICS & GYNECOLOGY

## 2024-04-23 PROCEDURE — NC001 PR NO CHARGE: Performed by: OBSTETRICS & GYNECOLOGY

## 2024-04-23 PROCEDURE — 86900 BLOOD TYPING SEROLOGIC ABO: CPT | Performed by: OBSTETRICS & GYNECOLOGY

## 2024-04-23 PROCEDURE — 59409 OBSTETRICAL CARE: CPT | Performed by: OBSTETRICS & GYNECOLOGY

## 2024-04-23 PROCEDURE — 99213 OFFICE O/P EST LOW 20 MIN: CPT

## 2024-04-23 PROCEDURE — 86780 TREPONEMA PALLIDUM: CPT | Performed by: OBSTETRICS & GYNECOLOGY

## 2024-04-23 PROCEDURE — 85027 COMPLETE CBC AUTOMATED: CPT | Performed by: OBSTETRICS & GYNECOLOGY

## 2024-04-23 RX ORDER — ONDANSETRON 2 MG/ML
4 INJECTION INTRAMUSCULAR; INTRAVENOUS EVERY 6 HOURS PRN
Status: DISCONTINUED | OUTPATIENT
Start: 2024-04-23 | End: 2024-04-23

## 2024-04-23 RX ORDER — IBUPROFEN 600 MG/1
600 TABLET ORAL EVERY 6 HOURS
Status: DISCONTINUED | OUTPATIENT
Start: 2024-04-23 | End: 2024-04-26 | Stop reason: HOSPADM

## 2024-04-23 RX ORDER — CALCIUM CARBONATE 500 MG/1
1000 TABLET, CHEWABLE ORAL DAILY PRN
Status: DISCONTINUED | OUTPATIENT
Start: 2024-04-23 | End: 2024-04-26 | Stop reason: HOSPADM

## 2024-04-23 RX ORDER — ONDANSETRON 2 MG/ML
4 INJECTION INTRAMUSCULAR; INTRAVENOUS EVERY 8 HOURS PRN
Status: DISCONTINUED | OUTPATIENT
Start: 2024-04-23 | End: 2024-04-26 | Stop reason: HOSPADM

## 2024-04-23 RX ORDER — OXYTOCIN/RINGER'S LACTATE 30/500 ML
250 PLASTIC BAG, INJECTION (ML) INTRAVENOUS ONCE
Status: COMPLETED | OUTPATIENT
Start: 2024-04-23 | End: 2024-04-23

## 2024-04-23 RX ORDER — LIDOCAINE HYDROCHLORIDE AND EPINEPHRINE 15; 5 MG/ML; UG/ML
INJECTION, SOLUTION EPIDURAL AS NEEDED
Status: DISCONTINUED | OUTPATIENT
Start: 2024-04-23 | End: 2024-04-24 | Stop reason: HOSPADM

## 2024-04-23 RX ORDER — ACETAMINOPHEN 325 MG/1
650 TABLET ORAL EVERY 4 HOURS PRN
Status: DISCONTINUED | OUTPATIENT
Start: 2024-04-23 | End: 2024-04-26 | Stop reason: HOSPADM

## 2024-04-23 RX ORDER — OXYTOCIN/RINGER'S LACTATE 30/500 ML
PLASTIC BAG, INJECTION (ML) INTRAVENOUS
Status: COMPLETED
Start: 2024-04-23 | End: 2024-04-23

## 2024-04-23 RX ORDER — DOCUSATE SODIUM 100 MG/1
100 CAPSULE, LIQUID FILLED ORAL 2 TIMES DAILY
Status: DISCONTINUED | OUTPATIENT
Start: 2024-04-23 | End: 2024-04-26 | Stop reason: HOSPADM

## 2024-04-23 RX ORDER — BENZOCAINE/MENTHOL 6 MG-10 MG
1 LOZENGE MUCOUS MEMBRANE DAILY PRN
Status: DISCONTINUED | OUTPATIENT
Start: 2024-04-23 | End: 2024-04-26 | Stop reason: HOSPADM

## 2024-04-23 RX ORDER — SODIUM CHLORIDE, SODIUM LACTATE, POTASSIUM CHLORIDE, CALCIUM CHLORIDE 600; 310; 30; 20 MG/100ML; MG/100ML; MG/100ML; MG/100ML
125 INJECTION, SOLUTION INTRAVENOUS CONTINUOUS
Status: DISCONTINUED | OUTPATIENT
Start: 2024-04-23 | End: 2024-04-23

## 2024-04-23 RX ORDER — TRANEXAMIC ACID 10 MG/ML
1000 INJECTION, SOLUTION INTRAVENOUS ONCE
Status: COMPLETED | OUTPATIENT
Start: 2024-04-23 | End: 2024-04-23

## 2024-04-23 RX ADMIN — LIDOCAINE HYDROCHLORIDE AND EPINEPHRINE 2 ML: 15; 5 INJECTION, SOLUTION EPIDURAL at 12:46

## 2024-04-23 RX ADMIN — DOCUSATE SODIUM 100 MG: 100 CAPSULE, LIQUID FILLED ORAL at 17:19

## 2024-04-23 RX ADMIN — SODIUM CHLORIDE, SODIUM LACTATE, POTASSIUM CHLORIDE, AND CALCIUM CHLORIDE 125 ML/HR: .6; .31; .03; .02 INJECTION, SOLUTION INTRAVENOUS at 12:45

## 2024-04-23 RX ADMIN — OXYTOCIN 250 MILLI-UNITS/MIN: 10 INJECTION INTRAVENOUS at 16:50

## 2024-04-23 RX ADMIN — LIDOCAINE HYDROCHLORIDE AND EPINEPHRINE 3 ML: 15; 5 INJECTION, SOLUTION EPIDURAL at 12:45

## 2024-04-23 RX ADMIN — SODIUM CHLORIDE, SODIUM LACTATE, POTASSIUM CHLORIDE, AND CALCIUM CHLORIDE 125 ML/HR: .6; .31; .03; .02 INJECTION, SOLUTION INTRAVENOUS at 15:15

## 2024-04-23 RX ADMIN — Medication 250 MILLI-UNITS/MIN: at 16:50

## 2024-04-23 RX ADMIN — SODIUM CHLORIDE, SODIUM LACTATE, POTASSIUM CHLORIDE, AND CALCIUM CHLORIDE 999 ML/HR: .6; .31; .03; .02 INJECTION, SOLUTION INTRAVENOUS at 11:53

## 2024-04-23 RX ADMIN — ROPIVACAINE HYDROCHLORIDE: 2 INJECTION, SOLUTION EPIDURAL; INFILTRATION at 12:51

## 2024-04-23 RX ADMIN — IBUPROFEN 600 MG: 600 TABLET, FILM COATED ORAL at 17:19

## 2024-04-23 RX ADMIN — IBUPROFEN 600 MG: 600 TABLET, FILM COATED ORAL at 23:20

## 2024-04-23 RX ADMIN — WITCH HAZEL 1 PAD: 500 SOLUTION RECTAL; TOPICAL at 19:58

## 2024-04-23 RX ADMIN — TRANEXAMIC ACID 1000 MG: 10 INJECTION, SOLUTION INTRAVENOUS at 16:58

## 2024-04-23 RX ADMIN — BENZOCAINE AND LEVOMENTHOL 1 APPLICATION: 200; 5 SPRAY TOPICAL at 19:58

## 2024-04-23 RX ADMIN — ONDANSETRON 4 MG: 2 INJECTION INTRAMUSCULAR; INTRAVENOUS at 14:41

## 2024-04-23 RX ADMIN — SODIUM CHLORIDE, SODIUM LACTATE, POTASSIUM CHLORIDE, AND CALCIUM CHLORIDE 300 ML: .6; .31; .03; .02 INJECTION, SOLUTION INTRAVENOUS at 15:14

## 2024-04-23 NOTE — ANESTHESIA PROCEDURE NOTES
Epidural Block    Patient location during procedure: OB/L&D  Start time: 4/23/2024 12:44 PM  Reason for block: procedure for pain  Staffing  Performed by: Alfredito Everett MD  Authorized by: Alfredito Everett MD    Preanesthetic Checklist  Completed: patient identified, IV checked, site marked, risks and benefits discussed, surgical consent, monitors and equipment checked, pre-op evaluation and timeout performed  Epidural  Patient position: sitting  Prep: ChloraPrep  Sedation Level: no sedation  Patient monitoring: frequent blood pressure checks, continuous pulse oximetry and heart rate  Approach: midline  Location: lumbar, L3-4  Injection technique: SYBIL saline  Needle  Needle type: Tuohy   Needle gauge: 18 G  Needle insertion depth: 6.5 cm  Catheter type: multi-orifice  Catheter size: 20 G  Catheter at skin depth: 12 cm  Catheter securement method: stabilization device, clear occlusive dressing and tape  Test dose: negative  Assessment  Sensory level: T10  Number of attempts: 2negative aspiration for CSF, negative aspiration for heme and no paresthesia on injection  patient tolerated the procedure well with no immediate complications

## 2024-04-23 NOTE — OB LABOR/OXYTOCIN SAFETY PROGRESS
Labor Progress Note - Lizeth Steineryburn 23 y.o. female MRN: [id number]    Unit/Bed#: -01 Encounter: [id number]       Contraction Frequency (minutes): 1.5-2.5  Contraction Intensity: Moderate  Uterine Activity Characteristics: Regular  Cervical Dilation: 5        Cervical Effacement: 90  Fetal Station: -1  Baseline Rate (FHR): 145 bpm  Fetal Heart Rate (FHT): 162 BPM  FHR Category: II               Vital Signs:   Vitals:    04/23/24 1451   BP:    Pulse:    Resp:    Temp: 97.8 °F (36.6 °C)   SpO2:        Notes/comments:   2 minute deceleration with return to baseline with repositioning. Moderate variability present. Patient is not being augmented. SVE progressing. Plan to continue to monitor. Will reassess in 2 hours or sooner if clinically indicated.      Karen Jacques MD 4/23/2024 2:52 PM

## 2024-04-23 NOTE — DISCHARGE SUMMARY
Obstetrics Discharge Summary  Lizeth Birch 23 y.o. female MRN: [id number]  Unit/Bed#: -01 Encounter: [id number]    Admission Date: 2024     Discharge Date: 24    Admitting Attending: Regino Sanchez Attending: Guicho  Discharging Attending: Guicho    Admitting Diagnoses:   1. Pregnancy at 39 weeks gestation     Discharge Diagnoses:   1. Same as above  2. Delivery of term     Procedures: spontaneous vaginal delivery    Anesthesia: epidural    Hospital course: Lizeth Birch is now a 23 y.o.  who was initially admitted at 39w5d for labor. She received an epidural for analgesia. Artificial amniotomy was performed for clear fluid. In the setting of recurrent variables, an IUPC was placed and an amnioinfusion was started with improvement of the FHT. She progressed to complete cervical dilation and began pushing.    On 24 she delivered a viable male  39w5d via normal spontaneous vaginal delivery. She sustained a first degree perineal laceration during delivery which was hemostatic and did not require repair. 's birth weight was 7lbs 1.8oz; Apgars were 8 (1 min) and 9 (5 min).  was admitted to the  nursery. Patient tolerated the procedure well.     Her post-delivery course was uncomplicated. Her postpartum pain was well controlled with oral analgesics. Maternal blood type is O positive so RhoGAM was not indicated.    On day of discharge, she was ambulating and able to reasonably perform all ADLs. She was voiding and had appropriate bowel function. Pain was well controlled. She was discharged home on postpartum day #2 without complications. Patient was instructed to follow up with her OBGYN as an outpatient and was given appropriate warnings to call provider if she develops signs of infection or uncontrolled pain.    Complications: none apparent    Condition at discharge: stable     Provisions for Follow-Up Care:  Please see after visit summary for  information related to follow-up care and any pertinent home health orders.      Disposition: Home    Planned Readmission: No    Discharge Medications:   Please see AVS for a complete list of discharge medications.    Discharge instructions :   Please see AVS for complete discharge instructions.    Karen Jacques MD   PGY-I, OBGYN  4/25/2024  6:49 AM

## 2024-04-23 NOTE — L&D DELIVERY NOTE
OBGYN Vaginal Delivery Summary  Lizeth Birch 23 y.o. female MRN: [id number]  Unit/Bed#: -01 Encounter: [id number]    Predelivery Diagnosis:  1. Pregnancy at 39 weeks gestation     Postdelivery Diagnosis:  1. Same as above  2. Delivery of term     Procedure: spontaneous vaginal delivery    Attending: Dr. Lindo    Assistant: Georgie    Anesthesia: Epidural    QBL: 71 mL  Admission Hgb: 12.2 g/dL  Admission platelets: 206 thousands/uL    Complications: none apparent    Specimens: cord blood, arterial and venous cord blood gases, placenta to storage    Findings:   1. Viable male at 1649, with APGARS of 8 and 9 at 1 and 5 minutes respectively. Weight pending at time of dictation.  2. Spontaneous delivery of intact placenta at 1652. Normal insertion 3 vessel umbilical cord  3. First degree perineal laceration hemostatic, not requiring repair  4. Blood gases:  Umbilical Cord Venous Blood Gas:  Results from last 7 days   Lab Units 24  1652   PH COV  7.361   PCO2 COV mm HG 37.0   HCO3 COV mmol/L 20.5   BASE EXC COV mmol/L -4.3*   O2 CT CD VB mL/dL 15.5   O2 HGB, VENOUS CORD % 75.0     Umbilical Cord Arterial Blood Gas:  Results from last 7 days   Lab Units 24  1652   PH COA  7.284   PCO2 COA  51.1   PO2 COA mm HG <10.0   HCO3 COA mmol/L 23.7   BASE EXC COA mmol/L -3.4*   O2 HGB, ARTERIAL CORD % 10.7       Brief history and labor course:  Lizeth Birch is a 23 y.o.  at 39wk5d. She presented to labor and delivery in labor. She received an epidural for analgesia. Artificial amniotomy was performed for clear fluid. In the setting of recurrent variables, an IUPC was placed and an amnioinfusion was started with improvement of the FHT. She progressed to complete cervical dilation and began pushing.     Description of procedure  After pushing for 58 minutes, the patient delivered a viable male  at 1649 on 24, weight pending at time of dictation, apgars of 8 (1 min) and 9 (5 min).  The fetal vertex delivered spontaneously. Baby restituted  to RENU. There was a loose nuchal cord which was easily reduced. The anterior (left) shoulder delivered atraumatically with maternal expulsive forces and the assistance of gentle downward traction. The posterior shoulder delivered with maternal expulsive forces and the assistance of gentle upward traction. The remainder of the fetus then delivered spontaneously.     Upon delivery the infant was placed on the mother's abdomen and delayed cord clamping was performed. The umbilical cord was then doubly clamped and cut. The infant was noted to cry spontaneously and was moving all extremities appropriately. There was no evidence for injury. Awaiting nurse resuscitators evaluated the . Arterial and venous cord blood gases and cord blood were collected for analysis and were promptly sent to the lab. In the immediate post-partum, IV pitocin was administered, and the uterus was noted to contract down well with massage and pitocin. The placenta delivered with uterine massage at 1652 and was noted to be intact and had normal insertion of a 3 vessel cord. The placenta was sent to storage. She received TXA for additional hemostasis.     The vagina, cervix, perineum, and rectum were inspected. First degree perineal laceration was noted. It was hemostatic and did not require repair.    At the conclusion of the procedure, all needle, sponge, and instrument counts were noted to be correct.      Dr. Lindo was present and participated in all key portions of the case.    Disposition:  Patient tolerated the procedure well and was recovering in labor and delivery room.  is admitted to  nursery.      Karen Jacques MD  2024  5:26 PM

## 2024-04-23 NOTE — OB LABOR/OXYTOCIN SAFETY PROGRESS
Labor Progress Note - Lizeth Steineryburn 23 y.o. female MRN: [id number]    Unit/Bed#: -01 Encounter: [id number]       Contraction Frequency (minutes): 1.5-2.5  Contraction Intensity: Moderate  Uterine Activity Characteristics: Regular  Cervical Dilation: 7        Cervical Effacement: 90  Fetal Station: 0  Baseline Rate (FHR): 145 bpm  Fetal Heart Rate (FHT): 162 BPM  FHR Category: II               Vital Signs:   Vitals:    04/23/24 1500   BP: 98/53   Pulse: 88   Resp:    Temp:    SpO2:        Notes/comments:   Recurrent variable decelerations with moderate variability in the setting of quick cervical change. IUPC placed and amnioinfusion started. Dr. Lacy present and aware. Plan to reassess in 2 hours or sooner if clinically indicated.      Karen Jacques MD 4/23/2024 3:08 PM

## 2024-04-23 NOTE — PLAN OF CARE
Problem: BIRTH - VAGINAL/ SECTION  Goal: Fetal and maternal status remain reassuring during the birth process  Description: INTERVENTIONS:  - Monitor vital signs  - Monitor fetal heart rate  - Monitor uterine activity  - Monitor labor progression (vaginal delivery)  - DVT prophylaxis  - Antibiotic prophylaxis  Outcome: Progressing  Goal: Emotionally satisfying birthing experience for mother/fetus  Description: Interventions:  - Assess, plan, implement and evaluate the nursing care given to the patient in labor  - Advocate the philosophy that each childbirth experience is a unique experience and support the family's chosen level of involvement and control during the labor process   - Actively participate in both the patient's and family's teaching of the birth process  - Consider cultural, Faith and age-specific factors and plan care for the patient in labor  Outcome: Progressing     Problem: PAIN - ADULT  Goal: Verbalizes/displays adequate comfort level or baseline comfort level  Description: Interventions:  - Encourage patient to monitor pain and request assistance  - Assess pain using appropriate pain scale  - Administer analgesics based on type and severity of pain and evaluate response  - Implement non-pharmacological measures as appropriate and evaluate response  - Consider cultural and social influences on pain and pain management  - Notify physician/advanced practitioner if interventions unsuccessful or patient reports new pain  Outcome: Progressing     Problem: INFECTION - ADULT  Goal: Absence or prevention of progression during hospitalization  Description: INTERVENTIONS:  - Assess and monitor for signs and symptoms of infection  - Monitor lab/diagnostic results  - Monitor all insertion sites, i.e. indwelling lines, tubes, and drains  - Monitor endotracheal if appropriate and nasal secretions for changes in amount and color  - [place] appropriate cooling/warming therapies per order  -  Administer medications as ordered  - Instruct and encourage patient and family to use good hand hygiene technique  - Identify and instruct in appropriate isolation precautions for identified infection/condition  Outcome: Progressing  Goal: Absence of fever/infection during neutropenic period  Description: INTERVENTIONS:  - Monitor WBC    Outcome: Progressing     Problem: SAFETY ADULT  Goal: Patient will remain free of falls  Description: INTERVENTIONS:  - Educate patient/family on patient safety including physical limitations  - Instruct patient to call for assistance with activity   - Consult OT/PT to assist with strengthening/mobility   - Keep Call bell within reach  - Keep bed low and locked with side rails adjusted as appropriate  - Keep care items and personal belongings within reach  - Initiate and maintain comfort rounds  - Make Fall Risk Sign visible to staff  - Offer Toileting every  Hours, in advance of need  - Initiate/Maintain alarm  - Obtain necessary fall risk management equipment:   - Apply yellow socks and bracelet for high fall risk patients  - Consider moving patient to room near nurses station  Outcome: Progressing  Goal: Maintain or return to baseline ADL function  Description: INTERVENTIONS:  -  Assess patient's ability to carry out ADLs; assess patient's baseline for ADL function and identify physical deficits which impact ability to perform ADLs (bathing, care of mouth/teeth, toileting, grooming, dressing, etc.)  - Assess/evaluate cause of self-care deficits   - Assess range of motion  - Assess patient's mobility; develop plan if impaired  - Assess patient's need for assistive devices and provide as appropriate  - Encourage maximum independence but intervene and supervise when necessary  - Involve family in performance of ADLs  - Assess for home care needs following discharge   - Consider OT consult to assist with ADL evaluation and planning for discharge  - Provide patient education as  appropriate  Outcome: Progressing  Goal: Maintains/Returns to pre admission functional level  Description: INTERVENTIONS:  - Perform AM-PAC 6 Click Basic Mobility/ Daily Activity assessment daily.  - Set and communicate daily mobility goal to care team and patient/family/caregiver.   - Collaborate with rehabilitation services on mobility goals if consulted  - Perform Range of Motion  times a day.  - Reposition patient every  hours.  - Dangle patient  times a day  - Stand patient  times a day  - Ambulate patient  times a day  - Out of bed to chair  times a day   - Out of bed for meals  times a day  - Out of bed for toileting  - Record patient progress and toleration of activity level   Outcome: Progressing     Problem: Knowledge Deficit  Goal: Patient/family/caregiver demonstrates understanding of disease process, treatment plan, medications, and discharge instructions  Description: Complete learning assessment and assess knowledge base.  Interventions:  - Provide teaching at level of understanding  - Provide teaching via preferred learning methods  Outcome: Progressing     Problem: DISCHARGE PLANNING  Goal: Discharge to home or other facility with appropriate resources  Description: INTERVENTIONS:  - Identify barriers to discharge w/patient and caregiver  - Arrange for needed discharge resources and transportation as appropriate  - Identify discharge learning needs (meds, wound care, etc.)  - Arrange for interpretive services to assist at discharge as needed  - Refer to Case Management Department for coordinating discharge planning if the patient needs post-hospital services based on physician/advanced practitioner order or complex needs related to functional status, cognitive ability, or social support system  Outcome: Progressing

## 2024-04-23 NOTE — ASSESSMENT & PLAN NOTE
Routine postpartum care  Encourage ambulation  Encourage familial bonding  Lactation support as needed  Pain: Motrin/Tylenol  Postpartum Contraceptive plan: declines  Appropriate bowel and bladder function  DVT Ppx: ambulation

## 2024-04-23 NOTE — TELEPHONE ENCOUNTER
"Patient called, complaint of contractions started yesterday afternoon.  Irregular, and now they are stronger and closer.  Currently contractions are 4 minutes apart, lasting 45 seconds.  Feeling baby move a little, but did not do kick counts.  Denies vaginal bleeding, loss of fluid.  Patient has someone to drive her to Labor and Delivery. Advised patient to report to Labor and Delivery for evaluation. IOL is scheduled for this evening.     Tiger Text to Dr. Lindo on call to make aware.     Tiger Text to Brandy Garduno charge nurse to make aware.       Reason for Disposition   First baby (primipara) with contractions < 6 minutes apart, and present 2 hours    Additional Information   Having contractions or other symptoms of labor (such as vaginal pressure) and pregnant 37 or more weeks (i.e., term pregnancy)    Answer Assessment - Initial Assessment Questions  1. LOCATION: \"Where does it hurt?\"       Contractions started yesterday afternoon.  Irregular, and now they are stronger and closer.  Currently contractions are 4 minutes apart, lasting 45 seconds.  Feeling baby move a little, but did not do kick counts.  Denies vaginal bleeding, loss of fluid.    Protocols used: Pregnancy - Abdominal Pain Greater Than 20 Weeks EGA-ADULT-[place], Pregnancy - Labor-ADULT-[place]    "

## 2024-04-23 NOTE — OB LABOR/OXYTOCIN SAFETY PROGRESS
Labor Progress Note - Lizeth Steineryburn 23 y.o. female MRN: [id number]    Unit/Bed#: -01 Encounter: [id number]       Contraction Frequency (minutes): 1-2  Contraction Intensity: Moderate  Uterine Activity Characteristics: Regular  Cervical Dilation: 10  Dilation Complete Date: 04/23/24  Dilation Complete Time: 1547  Cervical Effacement: 100  Fetal Station: 1  Baseline Rate (FHR): 145 bpm  Fetal Heart Rate (FHT): 140 BPM  FHR Category: I               Vital Signs:   Vitals:    04/23/24 1546   BP:    Pulse:    Resp:    Temp: 98 °F (36.7 °C)   SpO2:        Notes/comments:   Patient feeling constant pressure. Complete. Plan to start pushing.      Kaern Jacques MD 4/23/2024 3:47 PM

## 2024-04-23 NOTE — H&P
H & P- Obstetrics   Lizeth Birch 23 y.o. female MRN: [id number]  Unit/Bed#: LD TRIAGE  Encounter: [id number]      Assessment/Plan:    Lizeth is a 23 y.o.  at 39w5d admitted for labor.    SVE: Cervical Dilation: 3-4  Cervical Effacement: 80  Cervical Consistency: Soft  Fetal Station: -1  Presentation: Vertex  Position: Unknown  Method: Manual  OB Examiner: Sander      39 weeks gestation of pregnancy  Assessment & Plan  active labor without rupture of membranes, cervix of 3.5.  -epidural  -AROM once patient is comfortable  -possible augmentation with oxytocin  - monitor patients and baby's vitals  -125 mL/hr lactate ringer  -4 mg zofran prn 6 hours for nausea  -CBC, RPR, type and screen               Patient of: [facility]   This patient will be an INPATIENT  and I certify the anticipated length of stay is <2 Midnights  Discussed with Dr. Lacy      SUBJECTIVE:    Chief Complaint: contractions and pelvic pressure that started yesterday afternoon that has becoming more frequent and severe.     HPI: Lizeth Birch is a 23 y.o.  with an VINNY of 2024, by Ultrasound at 39w5d who is being admitted for labor . She complains of uterine contractions, occurring every 4 minutes, has no LOF, and reports no VB. She states she has felt good FM.. This pregnancy is complicated by nothing. All other review of systems is negative.       Pregnancy Plan:  Pregnancy: Toth  Fetal sex: Male  Support person: donna     Delivery Plans  Planned delivery method: Vaginal  Planned anesthesia: Epidural  Acceptable blood products: All     Post-Delivery Plans  Feeding intentions: Breast Milk      Patient Active Problem List   Diagnosis    Migraine with aura and without status migrainosus, not intractable    Vitamin D deficiency    Syncope    Viral infection, unspecified    Pregnancy    Decreased fetal movement    Dizzy    39 weeks gestation of pregnancy       OB History    Para Term  AB Living  "  2       1     SAB IAB Ectopic Multiple Live Births   1              # Outcome Date GA Lbr Ricky/2nd Weight Sex Delivery Anes PTL Lv   2 Current            1 SAB 2023 6w0d             Obstetric Comments    CF/SMA neg; Hgb electrophoresis WNL; varicella not immune       Past Medical History:   Diagnosis Date    Anxiety     Depression     Miscarriage        Past Surgical History:   Procedure Laterality Date    WISDOM TOOTH EXTRACTION         Social History     Tobacco Use    Smoking status: Never    Smokeless tobacco: Never   Substance Use Topics    Alcohol use: Not Currently       No Known Allergies    Medications Prior to Admission   Medication    Prenatal Multivit-Min-Fe-FA (PRE- FORMULA PO)           OBJECTIVE:  Vitals:  Temp:  [98.9 °F (37.2 °C)] 98.9 °F (37.2 °C)  HR:  [118] 118  Resp:  [20] 20  BP: (118)/(68) 118/68  There is no height or weight on file to calculate BMI.     Physical Exam:  General: Well appearing, no distress  Respiratory: Unlabored breathing  Cardiovascular: Regular rate.S1 and S2 present in all regions. No S3, S4, murmurs, gallops, or rubs.  Abdomen: Soft, gravid, nontender  Fundal Height: Appropriate for gestational age.  Extremities: Warm and well perfused.  Non tender.  Psychiatric: Behavioral normal        FHT: baseline of 145, 15/15 accelerations with intermittent decelerations and moderate variability. Category 2 tracing.       TOCO: difficult to determine due to artifacts.         Prenatal Labs: Blood Type:   Lab Results   Component Value Date/Time    ABO Grouping O 2023 09:17 AM     , D (Rh type):   Lab Results   Component Value Date/Time    Rh Factor Positive 2023 09:17 AM     , Antibody Screen: No results found for: \"ANTIBODYSCR\" , HCT/HGB:   Lab Results   Component Value Date/Time    Hematocrit 35.7 2024 02:59 PM    Hemoglobin 11.5 2024 02:59 PM      , MCV:   Lab Results   Component Value Date/Time    MCV 89 2024 02:59 PM      , " "Platelets:   Lab Results   Component Value Date/Time    Platelets 200 04/11/2024 02:59 PM      , 1 hour Glucola:   Lab Results   Component Value Date/Time    Glucose 74 01/26/2024 11:04 AM   , Rubella:   Lab Results   Component Value Date/Time    Rubella IgG Quant 35.3 09/29/2023 09:17 AM        , VDRL/RPR:   Lab Results   Component Value Date/Time    RPR Non-Reactive 07/27/2022 10:03 PM      , Urine Culture/Screen:   Lab Results   Component Value Date/Time    Urine Culture No Growth <1000 cfu/mL 09/29/2023 09:17 AM       , Hep B:   Lab Results   Component Value Date/Time    Hepatitis B Surface Ag Non-reactive 09/29/2023 09:17 AM     , HIV:   Lab Results   Component Value Date/Time    HIV-1/HIV-2 Ab Non-Reactive 07/27/2022 10:03 PM     , Chlamydia:      Negative  Lab Results   Component Value Date/Time    N gonorrhoeae, DNA Probe Negative 10/12/2023 01:28 PM     , Group B Strep:    Lab Results   Component Value Date/Time    Strep Grp B PCR Negative 04/02/2024 09:59 AM        Patient is varicella not immune, declined flu and Tdap vaccines.    Walt Ferrer MD  4/23/2024  11:37 AM        Portions of the record may have been created with voice recognition software.  Occasional wrong word or \"sound a like\" substitutions may have occurred due to the inherent limitations of voice recognition software.  Read the chart carefully and recognize, using context, where substitutions have occurred   "

## 2024-04-23 NOTE — ANESTHESIA PREPROCEDURE EVALUATION
Procedure:  LABOR ANALGESIA    Relevant Problems   ANESTHESIA   (-) History of anesthesia complications      CARDIO   (+) Migraine with aura and without status migrainosus, not intractable   (-) HTN (hypertension)      GYN   (+) 39 weeks gestation of pregnancy   (+) Pregnancy      NEURO/PSYCH   (+) Migraine with aura and without status migrainosus, not intractable      PULMONARY   (-) Asthma   (-) URI (upper respiratory infection)      Lab Results   Component Value Date    WBC 10.69 (H) 04/23/2024    HGB 12.2 04/23/2024    HCT 37.5 04/23/2024    MCV 89 04/23/2024     04/23/2024   '  Physical Exam    Airway    Mallampati score: II  TM Distance: >3 FB  Neck ROM: full     Dental       Cardiovascular      Pulmonary      Other Findings  post-pubertal.      Anesthesia Plan  ASA Score- 2     Anesthesia Type- epidural with ASA Monitors.         Additional Monitors:     Airway Plan:            Plan Factors-Exercise tolerance (METS): >4 METS.    Chart reviewed. EKG reviewed.  Existing labs reviewed. Patient summary reviewed.                  Induction-     Postoperative Plan-     Informed Consent- Anesthetic plan and risks discussed with patient.  I personally reviewed this patient with the CRNA. Discussed and agreed on the Anesthesia Plan with the CRNA..

## 2024-04-23 NOTE — OB LABOR/OXYTOCIN SAFETY PROGRESS
Labor Progress Note - Lizeth Steineryburn 23 y.o. female MRN: [id number]    Unit/Bed#: -01 Encounter: [id number]       Contraction Frequency (minutes): 1.5-2  Contraction Intensity: Moderate  Uterine Activity Characteristics: Regular  Cervical Dilation: 4  Cervical Effacement: 90  Fetal Station: -1  Baseline Rate (FHR): 155 bpm  Fetal Heart Rate (FHT): 156 BPM  FHR Category: II               Vital Signs:   Vitals:    04/23/24 1300   BP: 122/56   Pulse: 97   Resp:    Temp:    SpO2:        Notes/comments:   Lizeth is s/p epidural. Cervical change noted. AROM performed with clear fluid noted. Monitor for worsening decelerations and continue intrauterine resuscitative efforts. Will discuss with Dr. Lacy.      Brenda Bailey MD 4/23/2024 1:18 PM

## 2024-04-23 NOTE — OB LABOR/OXYTOCIN SAFETY PROGRESS
Labor Progress Note - Lizeth Birch 23 y.o. female MRN: [id number]    Unit/Bed#: -01 Encounter: [id number]       Contraction Frequency (minutes): 1-2  Contraction Intensity: Moderate  Uterine Activity Characteristics: Regular  Cervical Dilation: 9        Cervical Effacement: 100  Fetal Station: 1  Baseline Rate (FHR): 145 bpm  Fetal Heart Rate (FHT): 140 BPM  FHR Category: II               Vital Signs:   Vitals:    04/23/24 1500   BP: 98/53   Pulse: 88   Resp:    Temp:    SpO2:        Notes/comments:   3 minute deceleration in setting of rapid cervical change. Moderate variability present. SVE as above. Plan to reassess in 2 hours or sooner if clinically indicated.     Karen Jacques MD 4/23/2024 3:24 PM

## 2024-04-24 LAB
DME PARACHUTE DELIVERY DATE ACTUAL: NORMAL
DME PARACHUTE DELIVERY DATE REQUESTED: NORMAL
DME PARACHUTE ITEM DESCRIPTION: NORMAL
DME PARACHUTE ORDER STATUS: NORMAL
DME PARACHUTE SUPPLIER NAME: NORMAL
DME PARACHUTE SUPPLIER PHONE: NORMAL

## 2024-04-24 PROCEDURE — 99024 POSTOP FOLLOW-UP VISIT: CPT | Performed by: PHYSICIAN ASSISTANT

## 2024-04-24 RX ADMIN — IBUPROFEN 600 MG: 600 TABLET, FILM COATED ORAL at 05:17

## 2024-04-24 RX ADMIN — ACETAMINOPHEN 650 MG: 325 TABLET, FILM COATED ORAL at 21:35

## 2024-04-24 RX ADMIN — IBUPROFEN 600 MG: 600 TABLET, FILM COATED ORAL at 17:47

## 2024-04-24 RX ADMIN — IBUPROFEN 600 MG: 600 TABLET, FILM COATED ORAL at 23:22

## 2024-04-24 RX ADMIN — DOCUSATE SODIUM 100 MG: 100 CAPSULE, LIQUID FILLED ORAL at 17:48

## 2024-04-24 RX ADMIN — DOCUSATE SODIUM 100 MG: 100 CAPSULE, LIQUID FILLED ORAL at 08:06

## 2024-04-24 RX ADMIN — IBUPROFEN 600 MG: 600 TABLET, FILM COATED ORAL at 11:03

## 2024-04-24 NOTE — PLAN OF CARE
Problem: PAIN - ADULT  Goal: Verbalizes/displays adequate comfort level or baseline comfort level  Description: Interventions:  - Encourage patient to monitor pain and request assistance  - Assess pain using appropriate pain scale  - Administer analgesics based on type and severity of pain and evaluate response  - Implement non-pharmacological measures as appropriate and evaluate response  - Consider cultural and social influences on pain and pain management  - Notify physician/advanced practitioner if interventions unsuccessful or patient reports new pain  Outcome: Progressing     Problem: INFECTION - ADULT  Goal: Absence or prevention of progression during hospitalization  Description: INTERVENTIONS:  - Assess and monitor for signs and symptoms of infection  - Monitor lab/diagnostic results  - Monitor all insertion sites, i.e. indwelling lines, tubes, and drains  - Monitor endotracheal if appropriate and nasal secretions for changes in amount and color  - [place] appropriate cooling/warming therapies per order  - Administer medications as ordered  - Instruct and encourage patient and family to use good hand hygiene technique  - Identify and instruct in appropriate isolation precautions for identified infection/condition  Outcome: Progressing  Goal: Absence of fever/infection during neutropenic period  Description: INTERVENTIONS:  - Monitor WBC    Outcome: Progressing     Problem: SAFETY ADULT  Goal: Patient will remain free of falls  Description: INTERVENTIONS:  - Educate patient/family on patient safety including physical limitations  - Instruct patient to call for assistance with activity   - Consult OT/PT to assist with strengthening/mobility   - Keep Call bell within reach  - Keep bed low and locked with side rails adjusted as appropriate  - Keep care items and personal belongings within reach  - Initiate and maintain comfort rounds  - Make Fall Risk Sign visible to staff  - Apply yellow socks and bracelet  for high fall risk patients  - Consider moving patient to room near nurses station  Outcome: Progressing  Goal: Maintain or return to baseline ADL function  Description: INTERVENTIONS:  -  Assess patient's ability to carry out ADLs; assess patient's baseline for ADL function and identify physical deficits which impact ability to perform ADLs (bathing, care of mouth/teeth, toileting, grooming, dressing, etc.)  - Assess/evaluate cause of self-care deficits   - Assess range of motion  - Assess patient's mobility; develop plan if impaired  - Assess patient's need for assistive devices and provide as appropriate  - Encourage maximum independence but intervene and supervise when necessary  - Involve family in performance of ADLs  - Assess for home care needs following discharge   - Consider OT consult to assist with ADL evaluation and planning for discharge  - Provide patient education as appropriate  Outcome: Progressing  Goal: Maintains/Returns to pre admission functional level  Description: INTERVENTIONS:  - Perform AM-PAC 6 Click Basic Mobility/ Daily Activity assessment daily.  - Set and communicate daily mobility goal to care team and patient/family/caregiver.   - Collaborate with rehabilitation services on mobility goals if consulted  - Out of bed for toileting  - Record patient progress and toleration of activity level   Outcome: Progressing     Problem: Knowledge Deficit  Goal: Patient/family/caregiver demonstrates understanding of disease process, treatment plan, medications, and discharge instructions  Description: Complete learning assessment and assess knowledge base.  Interventions:  - Provide teaching at level of understanding  - Provide teaching via preferred learning methods  Outcome: Progressing     Problem: DISCHARGE PLANNING  Goal: Discharge to home or other facility with appropriate resources  Description: INTERVENTIONS:  - Identify barriers to discharge w/patient and caregiver  - Arrange for needed  discharge resources and transportation as appropriate  - Identify discharge learning needs (meds, wound care, etc.)  - Arrange for interpretive services to assist at discharge as needed  - Refer to Case Management Department for coordinating discharge planning if the patient needs post-hospital services based on physician/advanced practitioner order or complex needs related to functional status, cognitive ability, or social support system  Outcome: Progressing     Problem: POSTPARTUM  Goal: Experiences normal postpartum course  Description: INTERVENTIONS:  - Monitor maternal vital signs  - Assess uterine involution and lochia  Outcome: Progressing  Goal: Appropriate maternal -  bonding  Description: INTERVENTIONS:  - Identify family support  - Assess for appropriate maternal/infant bonding   -Encourage maternal/infant bonding opportunities  - Referral to  or  as needed  Outcome: Progressing  Goal: Establishment of infant feeding pattern  Description: INTERVENTIONS:  - Assess breast/bottle feeding  - Refer to lactation as needed  Outcome: Progressing  Goal: Incision(s), wounds(s) or drain site(s) healing without S/S of infection  Description: INTERVENTIONS  - Assess and document dressing, incision, wound bed, drain sites and surrounding tissue  - Provide patient and family education    Outcome: Progressing

## 2024-04-24 NOTE — LACTATION NOTE
This note was copied from a baby's chart.  CONSULT - LACTATION  Baby Boy (Lizeth) Eyal 1 days male MRN: [id number]    [facility] AN NURSERY Room / Bed: (N)/(N) Encounter: [id number]    Maternal Information     MOTHER:  Lizeth Birch  Maternal Age: 23 y.o.   OB History: # 1 - Date: 2023, Sex: None, Weight: None, GA: 6w0d, Delivery: None, Apgar1: None, Apgar5: None, Living: None, Birth Comments: None    # 2 - Date: 24, Sex: Male, Weight: 3225 g (7 lb 1.8 oz), GA: 39w5d, Delivery: Vaginal, Spontaneous, Apgar1: 8, Apgar5: 9, Living: Living, Birth Comments: None   Previouse breast reduction surgery? No    Lactation history:   Has patient previously breast fed: No   How long had patient previously breast fed:     Previous breast feeding complications:       Past Surgical History:   Procedure Laterality Date    WISDOM TOOTH EXTRACTION          Birth information:  YOB: 2025   Time of birth: 4:49 PM   Sex: male   Delivery type: Vaginal, Spontaneous   Birth Weight: 3225 g (7 lb 1.8 oz)   Percent of Weight Change: 0%     Gestational Age: 39w5d   [unfilled]    Assessment     Breast and nipple assessment: normal assessment    [place] Assessment: normal assessment    Feeding assessment: feeding well  LATCH:  Latch: Grasps breast, tongue down, lips flanged, rhythmic sucking   Audible Swallowing: Spontaneous and intermittent (24 hours old)   Type of Nipple: Everted (After stimulation)   Comfort (Breast/Nipple): Soft/non-tender   Hold (Positioning): No assist from staff, mother able to position/hold infant   LATCH Score: 10        Having latch problems? No   Position(s) Used Cross Cradle   Breasts/Nipples   Left Breast Soft   Right Breast Soft   Left Nipple Everted   Right Nipple Everted   Breastfeeding Progress Breastfeeding well   Breast Pump   Pump 3  (Medela through insurance)   Patient Follow-Up   Lactation Consult Status 2   Follow-Up Type  Inpatient;Call as needed   Other OB Lactation Documentation    Additional Problem Noted RSB/DC. Good latch in cross-cradle hold. Reviewd snug hold and breast compressions.       Feeding recommendations:  breast feed on demand  At the start of consult, Lizeth was feeding baby in cross-cradle hold on left breast. Lizeth reports feeding has been going well, she reports a tugging sensation with latches and denies pain.    Infant noted to be pulling off the breast with space between mother and baby. Educated mother on snug hold with infants cheeks touching breast. Lizeth reports latch feels better after modification. Reviewed breast compressions and offering both breasts with each feeding. Reviewed cluster feeding and second night syndrome.     RSB and DC booklets reviewed.     Lizeth has Medela breast pump through insurance.     Encouraged to call for additional latch support or questions, ext. Provided.     Met with mother. Provided mother with Ready, Set, Baby booklet.    Information on hand expression given. Discussed benefits of knowing how to manually express breast including stimulating milk supply, softening nipple for latch and evacuating breast in the event of engorgement.    Mom is encouraged to place baby skin to skin for feedings. Skin to skin education provided for baby placement on mother's chest, baby only in diaper, blankets below shoulders on baby's back. Skin to skin is encouraged to continue at home for feedings and between feedings.    - Start feedings on breast that last feeding ended   - allow no more than 3 hours between breast feeding sessions   - time between feedings is counted from the beginning of the first feed to the beginning of the next feeding session    Reviewed early signs of hunger, including tensing of hands and shoulders - no need to wait for open eyes.  Crying is a late hunger sign.  If baby is crying, soothe baby first and then attempt to latch.  Reviewed normal sucking  patterns: transition from stimulation to nutritive to release or non-nutritive. The goal is to see and hear lots of swallowing.    Reviewed normal nursing pattern: infant could latch on one breast up to 30 minutes or until releases on own. Signs of satiation is open hand with fingers that do not grab your finger.  Discussed difference in sensation of non-nutritive v nutritive sucking    Discussed Skin to Skin contact an benefits to mom and baby.  Talked about the delay of the first bath until baby has adjusted. Spoke about the benefits of rooming in. Feeding on cue and what that means for recognizing infant's hunger. Avoidance of pacifiers for the first month discussed. Talked about exclusive breastfeeding for the first 6 months.    Positioning and latch reviewed as well as showing images of other feeding positions.  Discussed the properties of a good latch in any position. Reviewed hand/manual expression.  Discussed s/s that baby is getting enough milk and some s/s that breastfeeding dyad may need further help.    Gave information on common concerns, what to expect the first few weeks after delivery, preparing for other caregivers, and how partners can help. Resources for support also provided.      Cristina Jordan RN 4/24/2024 1:48 PM

## 2024-04-24 NOTE — CASE MANAGEMENT
Case Management Progress Note    Patient name Lizeth Birch  Location LD /- MRN [id number]  : 2000 Date 2024       LOS (days): 1  Geometric Mean LOS (GMLOS) (days):   Days to GMLOS:        OBJECTIVE:        Current admission status: Inpatient  Preferred Pharmacy:   [facility]/pharmacy #[id number] - [place], [place] - [address]  [address] [address]  Phone: [phone] Fax: [phone]    Primary Care Provider: Sheron Galvez DO    Primary Insurance: [employer]  Secondary Insurance:     PROGRESS NOTE:    CM received consult for MOB requesting Medela Pump in Style  breast pump for home use. CM reviewed [place] and pump was ordered through [facility] by OP provider prior to admission. CM notified [employer] team via email that baby has been born and requested pump be delivered to MOB's bedside.

## 2024-04-24 NOTE — PLAN OF CARE
Problem: PAIN - ADULT  Goal: Verbalizes/displays adequate comfort level or baseline comfort level  Description: Interventions:  - Encourage patient to monitor pain and request assistance  - Assess pain using appropriate pain scale  - Administer analgesics based on type and severity of pain and evaluate response  - Implement non-pharmacological measures as appropriate and evaluate response  - Consider cultural and social influences on pain and pain management  - Notify physician/advanced practitioner if interventions unsuccessful or patient reports new pain  Outcome: Progressing     Problem: INFECTION - ADULT  Goal: Absence or prevention of progression during hospitalization  Description: INTERVENTIONS:  - Assess and monitor for signs and symptoms of infection  - Monitor lab/diagnostic results  - Monitor all insertion sites, i.e. indwelling lines, tubes, and drains  - Monitor endotracheal if appropriate and nasal secretions for changes in amount and color  - [place] appropriate cooling/warming therapies per order  - Administer medications as ordered  - Instruct and encourage patient and family to use good hand hygiene technique  - Identify and instruct in appropriate isolation precautions for identified infection/condition  Outcome: Progressing  Goal: Absence of fever/infection during neutropenic period  Description: INTERVENTIONS:  - Monitor WBC    Outcome: Progressing     Problem: SAFETY ADULT  Goal: Patient will remain free of falls  Description: INTERVENTIONS:  - Educate patient/family on patient safety including physical limitations  - Instruct patient to call for assistance with activity   - Consult OT/PT to assist with strengthening/mobility   - Keep Call bell within reach  - Keep bed low and locked with side rails adjusted as appropriate  - Keep care items and personal belongings within reach  - Initiate and maintain comfort rounds  - Make Fall Risk Sign visible to staff  - Apply yellow socks and bracelet  for high fall risk patients  - Consider moving patient to room near nurses station  Outcome: Progressing  Goal: Maintain or return to baseline ADL function  Description: INTERVENTIONS:  -  Assess patient's ability to carry out ADLs; assess patient's baseline for ADL function and identify physical deficits which impact ability to perform ADLs (bathing, care of mouth/teeth, toileting, grooming, dressing, etc.)  - Assess/evaluate cause of self-care deficits   - Assess range of motion  - Assess patient's mobility; develop plan if impaired  - Assess patient's need for assistive devices and provide as appropriate  - Encourage maximum independence but intervene and supervise when necessary  - Involve family in performance of ADLs  - Assess for home care needs following discharge   - Consider OT consult to assist with ADL evaluation and planning for discharge  - Provide patient education as appropriate  Outcome: Progressing  Goal: Maintains/Returns to pre admission functional level  Description: INTERVENTIONS:  - Perform AM-PAC 6 Click Basic Mobility/ Daily Activity assessment daily.  - Set and communicate daily mobility goal to care team and patient/family/caregiver.   - Collaborate with rehabilitation services on mobility goals if consulted  - Out of bed for toileting  - Record patient progress and toleration of activity level   Outcome: Progressing     Problem: Knowledge Deficit  Goal: Patient/family/caregiver demonstrates understanding of disease process, treatment plan, medications, and discharge instructions  Description: Complete learning assessment and assess knowledge base.  Interventions:  - Provide teaching at level of understanding  - Provide teaching via preferred learning methods  Outcome: Progressing     Problem: DISCHARGE PLANNING  Goal: Discharge to home or other facility with appropriate resources  Description: INTERVENTIONS:  - Identify barriers to discharge w/patient and caregiver  - Arrange for needed  discharge resources and transportation as appropriate  - Identify discharge learning needs (meds, wound care, etc.)  - Arrange for interpretive services to assist at discharge as needed  - Refer to Case Management Department for coordinating discharge planning if the patient needs post-hospital services based on physician/advanced practitioner order or complex needs related to functional status, cognitive ability, or social support system  Outcome: Progressing

## 2024-04-24 NOTE — PLAN OF CARE
Problem: POSTPARTUM  Goal: Experiences normal postpartum course  Description: INTERVENTIONS:  - Monitor maternal vital signs  - Assess uterine involution and lochia  Outcome: Progressing  Goal: Appropriate maternal -  bonding  Description: INTERVENTIONS:  - Identify family support  - Assess for appropriate maternal/infant bonding   -Encourage maternal/infant bonding opportunities  - Referral to  or  as needed  Outcome: Progressing  Goal: Establishment of infant feeding pattern  Description: INTERVENTIONS:  - Assess breast/bottle feeding  - Refer to lactation as needed  Outcome: Progressing  Goal: Incision(s), wounds(s) or drain site(s) healing without S/S of infection  Description: INTERVENTIONS  - Assess and document dressing, incision, wound bed, drain sites and surrounding tissue  - Provide patient and family education  - Perform skin care/dressing changes every   Outcome: Progressing     Problem: PAIN - ADULT  Goal: Verbalizes/displays adequate comfort level or baseline comfort level  Description: Interventions:  - Encourage patient to monitor pain and request assistance  - Assess pain using appropriate pain scale  - Administer analgesics based on type and severity of pain and evaluate response  - Implement non-pharmacological measures as appropriate and evaluate response  - Consider cultural and social influences on pain and pain management  - Notify physician/advanced practitioner if interventions unsuccessful or patient reports new pain  Outcome: Progressing     Problem: SAFETY ADULT  Goal: Patient will remain free of falls  Description: INTERVENTIONS:  - Educate patient/family on patient safety including physical limitations  - Instruct patient to call for assistance with activity   - Consult OT/PT to assist with strengthening/mobility   - Keep Call bell within reach  - Keep bed low and locked with side rails adjusted as appropriate  - Keep care items and personal belongings  within reach  - Initiate and maintain comfort rounds  - Make Fall Risk Sign visible to staff  - Offer Toileting every  Hours, in advance of need  - Initiate/Maintain alarm  - Obtain necessary fall risk management equipment:   - Apply yellow socks and bracelet for high fall risk patients  - Consider moving patient to room near nurses station  Outcome: Progressing     Problem: Knowledge Deficit  Goal: Patient/family/caregiver demonstrates understanding of disease process, treatment plan, medications, and discharge instructions  Description: Complete learning assessment and assess knowledge base.  Interventions:  - Provide teaching at level of understanding  - Provide teaching via preferred learning methods  Outcome: Progressing

## 2024-04-24 NOTE — PROGRESS NOTES
"Progress Note - OB/GYN  Lizeth Birch 23 y.o. female MRN: [id number]  Unit/Bed#:  203 Encounter: [id number]    Assessment and Plan     Lizeth Birch is a patient of: [facility]. She is PPD# 1 s/p  spontaneous vaginal delivery  Recovering well and is stable       *  (spontaneous vaginal delivery)  Assessment & Plan  active labor without rupture of membranes, cervix of 3.5.  -epidural  -AROM once patient is comfortable  -possible augmentation with oxytocin  - monitor patients and baby's vitals  -125 mL/hr lactate ringer  -4 mg zofran prn 6 hours for nausea  -CBC, RPR, type and screen        Disposition    - Anticipate discharge home on PPD# 2      Subjective/Objective     Chief Complaint: Postpartum State     Subjective:    Lizeth Birch is PPD/POD#1 s/p  spontaneous vaginal delivery. She has no current complaints.  Pain is well controlled. She is recovering well and is stable.     Breastfeeding:  yes    Tolerating PO: yes  Nausea or Vomiting: no  Voiding: yes  Flatus: yes; has had no bowel movement.   Ambulating: yes  Chest pain: no  Shortness of breath: no  Leg pain: no  Lochia: appropriate     Vitals:   /56 (BP Location: Right arm)   Pulse 87   Temp 98.5 °F (36.9 °C) (Oral)   Resp 16   Ht 5' 2\" (1.575 m)   Wt 89.8 kg (198 lb)   LMP 2023 (Exact Date)   SpO2 98%   Breastfeeding Yes   BMI 36.21 kg/m²       Intake/Output Summary (Last 24 hours) at 2024 0605  Last data filed at 2024 2130  Gross per 24 hour   Intake --   Output 571 ml   Net -571 ml       Invasive Devices       Peripheral Intravenous Line  Duration             Peripheral IV 24 Left;Ventral (anterior) Forearm <1 day                    Physical Exam:   GEN: Lizeth Birch appears well, alert and oriented x 3, pleasant and cooperative   CARDIO: RRR, no murmurs or rubs  RESP:  CTAB, no wheezes or rales  ABDOMEN: soft, no tenderness, no distention, fundus @ umb  EXTREMITIES:non tender, no erythema, b/l " Fernando's sign negative      Labs:     Hemoglobin   Date Value Ref Range Status   04/23/2024 12.2 11.5 - 15.4 g/dL Final   04/11/2024 11.5 11.5 - 15.4 g/dL Final     WBC   Date Value Ref Range Status   04/23/2024 10.69 (H) 4.31 - 10.16 Thousand/uL Final   04/11/2024 8.06 4.31 - 10.16 Thousand/uL Final     Platelets   Date Value Ref Range Status   04/23/2024 206 149 - 390 Thousands/uL Final   04/11/2024 200 149 - 390 Thousands/uL Final     Creatinine   Date Value Ref Range Status   04/11/2024 0.63 0.60 - 1.30 mg/dL Final     Comment:     Standardized to IDMS reference method   09/06/2023 0.67 0.60 - 1.30 mg/dL Final     Comment:     Standardized to IDMS reference method     AST   Date Value Ref Range Status   09/06/2023 15 13 - 39 U/L Final   05/01/2023 16 13 - 39 U/L Final     ALT   Date Value Ref Range Status   09/06/2023 10 7 - 52 U/L Final     Comment:     Specimen collection should occur prior to Sulfasalazine administration due to the potential for falsely depressed results.    05/01/2023 12 7 - 52 U/L Final     Comment:     Specimen collection should occur prior to Sulfasalazine administration due to the potential for falsely depressed results.           Cookie Zaidi PA-C  4/24/2024  6:05 AM

## 2024-04-25 VITALS
WEIGHT: 198 LBS | RESPIRATION RATE: 18 BRPM | HEIGHT: 62 IN | TEMPERATURE: 98.3 F | OXYGEN SATURATION: 97 % | SYSTOLIC BLOOD PRESSURE: 115 MMHG | DIASTOLIC BLOOD PRESSURE: 72 MMHG | HEART RATE: 65 BPM | BODY MASS INDEX: 36.44 KG/M2

## 2024-04-25 PROCEDURE — NC001 PR NO CHARGE: Performed by: OBSTETRICS & GYNECOLOGY

## 2024-04-25 PROCEDURE — 99024 POSTOP FOLLOW-UP VISIT: CPT | Performed by: OBSTETRICS & GYNECOLOGY

## 2024-04-25 PROCEDURE — 90716 VAR VACCINE LIVE SUBQ: CPT

## 2024-04-25 RX ORDER — ACETAMINOPHEN 325 MG/1
650 TABLET ORAL EVERY 6 HOURS PRN
Start: 2024-04-25

## 2024-04-25 RX ORDER — IBUPROFEN 600 MG/1
600 TABLET ORAL EVERY 6 HOURS PRN
Start: 2024-04-25

## 2024-04-25 RX ORDER — BENZOCAINE/MENTHOL 6 MG-10 MG
1 LOZENGE MUCOUS MEMBRANE DAILY PRN
Start: 2024-04-25

## 2024-04-25 RX ADMIN — ACETAMINOPHEN 650 MG: 325 TABLET, FILM COATED ORAL at 22:55

## 2024-04-25 RX ADMIN — VARICELLA VIRUS VACCINE LIVE 0.5 ML: 1350 INJECTION, POWDER, LYOPHILIZED, FOR SUSPENSION SUBCUTANEOUS at 22:55

## 2024-04-25 RX ADMIN — IBUPROFEN 600 MG: 600 TABLET, FILM COATED ORAL at 05:31

## 2024-04-25 RX ADMIN — DOCUSATE SODIUM 100 MG: 100 CAPSULE, LIQUID FILLED ORAL at 09:38

## 2024-04-25 RX ADMIN — IBUPROFEN 600 MG: 600 TABLET, FILM COATED ORAL at 12:10

## 2024-04-25 RX ADMIN — IBUPROFEN 600 MG: 600 TABLET, FILM COATED ORAL at 18:43

## 2024-04-25 RX ADMIN — DOCUSATE SODIUM 100 MG: 100 CAPSULE, LIQUID FILLED ORAL at 18:43

## 2024-04-25 NOTE — PROGRESS NOTES
Obstetrics Progress Note  Lizeth Birch 23 y.o. female MRN: [id number]  Unit/Bed#:  302-01 Encounter: [id number]    Assessment/Plan:  Postpartum Day #2 s/p . Stable. Baby in room. By issue:  *  (spontaneous vaginal delivery)  Assessment & Plan  Routine postpartum care  Encourage ambulation  Encourage familial bonding  Lactation support as needed  Pain: Motrin/Tylenol  Postpartum Contraceptive plan: declines  Appropriate bowel and bladder function  DVT Ppx: ambulation      Anticipate discharge PPD #2.    Subjective/Objective   Chief Complaint:   Post delivery     Subjective:   Pain: well controlled. Tolerating PO: yes. Voiding: yes. Flatus: yes. Ambulating: yes. Chest pain: no. Shortness of breath: no. Leg pain: no. Lochia: within normal limits. Infant feeding: breast/bottle. She is ready to go home today.    Objective:   Vitals:   Temp:  [97.7 °F (36.5 °C)-98.3 °F (36.8 °C)] 98 °F (36.7 °C)  HR:  [69-96] 70  Resp:  [16-18] 18  BP: (107-118)/(65-77) 113/77     No intake or output data in the 24 hours ending 24 0609    Lab Results   Component Value Date    WBC 10.69 (H) 2024    HGB 12.2 2024    HCT 37.5 2024    MCV 89 2024     2024       Physical Exam:   General: alert and oriented x3, in no apparent distress  Cardiovascular: regular rate and rhythm  Pulmonary: normal effort, clear to auscultation bilaterally  Abdomen: Soft, non-tender, non-distended, no rebound or guarding. Uterine fundus firm and non-tender, -1 cm below the umbilicus   Extremities: Non tender    Karen Jacques MD  PGY-I, OBGYN  2024, 6:09 AM

## 2024-04-25 NOTE — PLAN OF CARE

## 2024-04-25 NOTE — LACTATION NOTE
This note was copied from a baby's chart.  CONSULT - LACTATION  Baby Boy (Lizeth) Eyal 2 days male MRN: [id number]    [facility] Room / Bed: (N)/(N) Encounter: [id number]    Maternal Information     MOTHER:  Lizeth Birch  Maternal Age: 23 y.o.   OB History: # 1 - Date: 2023, Sex: None, Weight: None, GA: 6w0d, Delivery: None, Apgar1: None, Apgar5: None, Living: None, Birth Comments: None    # 2 - Date: 24, Sex: Male, Weight: 3225 g (7 lb 1.8 oz), GA: 39w5d, Delivery: Vaginal, Spontaneous, Apgar1: 8, Apgar5: 9, Living: Living, Birth Comments: None   Previouse breast reduction surgery? No    Lactation history:   Has patient previously breast fed: No   How long had patient previously breast fed:     Previous breast feeding complications:       Past Surgical History:   Procedure Laterality Date    WISDOM TOOTH EXTRACTION          Birth information:  YOB: 2025   Time of birth: 4:49 PM   Sex: male   Delivery type: Vaginal, Spontaneous   Birth Weight: 3225 g (7 lb 1.8 oz)   Percent of Weight Change: -5%     Gestational Age: 39w5d   [unfilled]    Assessment     Breast and nipple assessment:  no clinical exam at this time.      Assessment:  baby in nursery         24 1039   Lactation Consultation   Reason for Consult 20 minutes;10 minute   Risk Factors Hyperbilirubinemia   Other OB Lactation Tools   Feeding Devices Bottle;Pump   Breast Pump   Pump 3   Pump Review/Education Milk storage;Setup, frequency, and cleaning   Patient Follow-Up   Lactation Consult Status 2   Follow-Up Type Inpatient;Call as needed   Other OB Lactation Documentation    Additional Problem Noted Mom states breastfeeding is going okay. baby is in nursery upon arrival into room. Mom gave Similac overnight 8-10 mLs. Mom states baby is nursing for about 20-30 minutes but still cueing. Baby cluster fed overnight. educ on cluster feedings. Discharge  feeding plan set up for mom. encouraged to call baby and me for an appointment.       Feeding recommendations:  breast feed on demand  Feeding Plan:  1. Meet early feeding cues  2. Bring baby to breast skin to skin  3. Tuck chin deeply into the breast to assist with deeper latch  4. Align nipple to nose, chin deep into breast, (move baby not breast) and bring baby to breast when mouth is wide and deep latch is achieved.  5. Use breast compressions to stimulate suck  6. introduce breast first for feeding  7. to feed infant expressed breast milk after breast  8. feed infant formula  9.  to pump after as many feedings at the breast as reasonable  10. Follow up with outpatient lactation as soon as possible      Discussed 2nd night syndrome and ways to calm infant. Hand out given. Information on hand expression given. Discussed benefits of knowing how to manually express breast including stimulating milk supply, softening nipple for latch and evacuating breast in the event of engorgement.    Discussed s/s engorgement, blocked milk ducts, and mastitis. Discussed how to remedy at home and when to contact physician. Reviewed engorgement and ways to reduce symptoms. Use a warmth on breasts with massage prior to feeding / pumping. Use massage during pumping over full ducts. Used cold, compression on breasts after feeding/pumping session. Ideas are rice in a sock. Place one in the freezer for cool and one in the microwave for warmth. Referred to discharge book / engorgement page.    Met with mother to go over discharge breastfeeding booklet including the feeding log. Emphasized 8 or more (12) feedings in a 24 hour period, what to expect for the number of diapers per day of life and the progression of properties of the  stooling pattern.    List of reasons to call a lactation consultant.  Hand expression  Baby's Second day (cluster feeding)  First Two Weeks Survival Guide for Breastfeeding  Breast Changes  Physical  Therapy  Storage and Handling of Breast milk  How to Keep Your Breast Pump Kit Clean  The Employed Breastfeeding Mother  Mixed feeding  Bottle feeding like breastfeeding (paced bottle feeding)  astfeeding and your lifestyle, storage and preparation of breast milk, how to keep you breast pump clean, the employed breastfeeding mother and paced bottle feeding handouts.     Booklet included Breastfeeding Resources for after discharge including access to the number for the [facility].    Laisha Maxwell 4/25/2024 10:41 AM

## 2024-04-26 ENCOUNTER — LACTATION ENCOUNTER (OUTPATIENT)
Dept: NURSERY | Facility: HOSPITAL | Age: 24
End: 2024-04-26

## 2024-04-26 NOTE — LACTATION NOTE
This note was copied from a baby's chart.     04/26/24 1141   Lactation Consultation   Reason for Consult 20 m;10 min   Risk Factors Hyperbilirubinemia   LATCH Documentation   Latch 1   Audible Swallowing 1   Type of Nipple 2   Comfort (Breast/Nipple) 2   Hold (Positioning) 2   LATCH Score 8   Having latch problems? No   Position(s) Used Cross Cradle   Breasts/Nipples   Left Breast Soft   Right Breast Soft   Left Nipple Everted   Right Nipple Everted   Intervention Hand expression   Breastfeeding Progress Breastfeeding well   Other OB Lactation Tools   Feeding Devices Bottle;Pump   Breast Pump   Pump 3   Patient Follow-Up   Lactation Consult Status 2   Follow-Up Type Inpatient;Call as needed   Other OB Lactation Documentation    Additional Problem Noted Mom breastfeeding baby upon arrival into room. baby latching and unlatching. Encouraged snug hold for baby at breast. Mom states she only has been nursing for 10 minutes each breast due to baby being on phototherapy and receiving 20 mLs formula overnight.  Mom is now letting baby nurse as long as he wants at breast. Finishing with formula if still cueing. Enc mom to let baby nurse at breast as much as possible. Reviewed hunger/fullness cues. Enc to call for further assistance.   Mom breastfeeding baby upon arrival into room. baby latching and unlatching. Encouraged snug hold for baby at breast. Mom states she only has been nursing for 10 minutes each breast due to baby being on phototherapy Baby receiving 20 mLs formula overnight after being at mom's breast.  Mom is now letting baby nurse as long as he wants at breast. Finishing with formula if still cueing. Enc mom to let baby nurse at breast as much as possible. Reviewed hunger/fullness cues. If baby is having short feeds encouraged mom to pump after feedings.    Encouraged parents to call for assistance, questions, and concerns about breastfeeding.  Extension provided.

## 2024-04-26 NOTE — UTILIZATION REVIEW
"Mother discharged on 2024  Baby detained       NOTIFICATION OF INPATIENT ADMISSION   MATERNITY/DELIVERY AUTHORIZATION REQUEST   SERVICING FACILITY:   [facility] - L&D, , NICU  [address] [place] [address]  Tax ID: [id number]  NPI: [id number]   ATTENDING PROVIDER:  Attending Name and NPI#: Karine Lindo Md [[id number]]  Address: [address] [place] [address]  Phone: [phone]   ADMISSION INFORMATION:  Place of Service: Inpatient [facility]  Place of Service Code: 21  Inpatient Admission Date/Time: 24 11:09 AM  Discharge Date/Time: 2024 11:15 PM  Admitting Diagnosis Code/Description:  Uterine contractions during pregnancy [O47.9]  Encounter for full-term uncomplicated delivery [O80]     Mother: Lizeth Birch 2000 Estimated Date of Delivery: 24  Delivering clinician: Karine Lindo    OB History          2    Para   1    Term   1            AB   1    Living   1         SAB   1    IAB        Ectopic        Multiple   0    Live Births   1           Obstetric Comments    CF/SMA neg; Hgb electrophoresis WNL; varicella not immune              Name & MRN:   Information for the patient's :  Eyal Baby Boy (Lizeth) [[id number]]    Delivery Information:  Sex: male  Delivered 2024 4:49 PM by Vaginal, Spontaneous; Gestational Age: 39w5d    [place] Measurements:  Weight: 7 lb 1.8 oz (3225 g);  Height: 20\"    APGAR 1 minute 5 minutes 10 minutes   Totals: 8 9       UTILIZATION REVIEW CONTACT:  Yodit Maddox, Utilization   Network Utilization Review Department  Phone: [phone]  Fax [phone]  Email: [email or web address]  Contact for approvals/pending authorizations, clinical reviews, and discharge.     PHYSICIAN ADVISORY SERVICES:  Medical Necessity Denial & Dxdo-am-[facility] Review  Phone: [phone]  Fax: [phone]  Email: " [email or web address]     DISCHARGE SUPPORT TEAM:  For Patients Discharge Needs & Updates  Phone: [phone] opt. 2 Fax: [phone]  Email: [email or web address]

## 2024-04-29 LAB — PLACENTA IN STORAGE: NORMAL

## 2024-05-02 ENCOUNTER — TELEPHONE (OUTPATIENT)
Dept: OBGYN CLINIC | Facility: CLINIC | Age: 24
End: 2024-05-02

## 2024-05-02 NOTE — TELEPHONE ENCOUNTER
POSTPARTUM PHONE CALL ASSESSMENT    Date of Delivery: 24  Delivering Provider: Dr. Gillespie  Mode:   Delivery Notes/Complications: none noted   Do you still have bleeding/pain? If so, how much/how severe? VB changing pad every 2-3 hours, starting to lessen not fully saturated. Feels sore but not pain.    Regular BMs/Urination? regular  Breastfeeding/Formula/Both? Breastfeeding, going well  How are you doing emotionally? Doing good   EPDS Score: 0  Do you have any other questions or concerns for us or your provider? None at this time  Have you scheduled the pediatrician appointment with pediatrician? yes  Do you have a postpartum visit scheduled? Yes, 24

## 2024-05-10 ENCOUNTER — NURSE TRIAGE (OUTPATIENT)
Age: 24
End: 2024-05-10

## 2024-05-14 ENCOUNTER — POSTPARTUM VISIT (OUTPATIENT)
Dept: OBGYN CLINIC | Facility: CLINIC | Age: 24
End: 2024-05-14

## 2024-05-14 VITALS
HEIGHT: 62 IN | BODY MASS INDEX: 32.46 KG/M2 | WEIGHT: 176.4 LBS | SYSTOLIC BLOOD PRESSURE: 130 MMHG | DIASTOLIC BLOOD PRESSURE: 70 MMHG

## 2024-05-14 PROCEDURE — 99024 POSTOP FOLLOW-UP VISIT: CPT | Performed by: PHYSICIAN ASSISTANT

## 2024-05-18 ENCOUNTER — OFFICE VISIT (OUTPATIENT)
Dept: URGENT CARE | Age: 24
End: 2024-05-18
Payer: COMMERCIAL

## 2024-05-18 VITALS
SYSTOLIC BLOOD PRESSURE: 120 MMHG | OXYGEN SATURATION: 98 % | DIASTOLIC BLOOD PRESSURE: 74 MMHG | HEART RATE: 128 BPM | RESPIRATION RATE: 18 BRPM

## 2024-05-18 DIAGNOSIS — N61.0 MASTITIS: Primary | ICD-10-CM

## 2024-05-18 PROCEDURE — 99213 OFFICE O/P EST LOW 20 MIN: CPT

## 2024-05-18 RX ORDER — FLUCONAZOLE 150 MG/1
150 TABLET ORAL ONCE
Qty: 1 TABLET | Refills: 0 | Status: SHIPPED | OUTPATIENT
Start: 2024-05-18 | End: 2024-05-18

## 2024-05-18 RX ORDER — CEPHALEXIN 500 MG/1
500 CAPSULE ORAL EVERY 8 HOURS SCHEDULED
Qty: 15 CAPSULE | Refills: 0 | Status: SHIPPED | OUTPATIENT
Start: 2024-05-18 | End: 2024-05-23

## 2024-05-18 NOTE — PATIENT INSTRUCTIONS
Suspect early, mild case of mastitis.   Please begin antibiotics as directed.   It is safe to continue to breast feed, warm compresses may also help.   Please follow up with Ob/Gyn and Pediatrician as soon as possible.

## 2024-05-18 NOTE — PROGRESS NOTES
[facility]        NAME: Lizeth Birch is a 24 y.o. female  : 2000    MRN: [id number]  DATE: May 18, 2024  TIME: 10:08 AM    Assessment and Plan   Mastitis [N61.0]  1. Mastitis  cephalexin (KEFLEX) 500 mg capsule    fluconazole (DIFLUCAN) 150 mg tablet      Suspect early, mild case of mastitis.   Please begin antibiotics as directed.   It is safe to continue to breast feed, warm compresses may also help.   Please follow up with Ob/Gyn and Pediatrician as soon as possible.       Patient Instructions   Mastitis   WHAT YOU NEED TO KNOW:   Mastitis is an infection of breast tissue that most often occurs in women who breastfeed. It can happen any time during breastfeeding, but usually occurs within the first 3 months after giving birth. Usually only one breast is affected.  DISCHARGE INSTRUCTIONS:   Contact your healthcare provider if:   Your symptoms do not get better within 2 days.     You have a painful lump in your breast.      You have swollen and tender lymph nodes in your armpit on the same side as the affected breast.     You have questions or concerns about your condition or care.     Medicines:  You may need any of the following:  Antibiotics  help treat or prevent a bacterial infection.     Acetaminophen  decreases pain and fever. It is available without a doctor's order. Ask how much to take and how often to take it. Follow directions. Acetaminophen can cause liver damage if not taken correctly.     NSAIDs , such as ibuprofen, help decrease swelling, pain, and fever. This medicine is available with or without a doctor's order. NSAIDs can cause stomach bleeding or kidney problems in certain people. If you take blood thinner medicine, always ask your healthcare provider if NSAIDs are safe for you. Always read the medicine label and follow directions.     Take your medicine as directed.  Contact your healthcare provider if you think your medicine is not helping or if you have side effects.  Tell your provider if you are allergic to any medicine. Keep a list of the medicines, vitamins, and herbs you take. Include the amounts, and when and why you take them. Bring the list or the pill bottles to follow-up visits. Carry your medicine list with you in case of an emergency.     Manage your symptoms:   Continue to breastfeed from the affected breast.  This will help to prevent an abscess from forming. Breastfeed your baby on the affected side first. Apply a warm, wet cloth on your breast or take a warm shower before you feed your baby. This can help increase your milk flow. If it is painful when you breastfeed from the affected breast, feed your baby from the other breast first. Pump the affected side to completely drain your breast after breastfeeding, if needed. You may save the pumped milk to feed your baby.      Use different positions to breastfeed.  Change the position of your baby during feedings. This may help to relieve your discomfort.      Apply heat on your breast for 20 to 30 minutes every 2 hours for as many days as directed.  Heat helps decrease pain.      Apply ice after feedings.  Apply ice on your breast for 15 to 20 minutes every hour or as directed. Use an ice pack, or put crushed ice in a plastic bag. Cover it with a towel. Ice helps prevent tissue damage and decreases swelling and pain.     Massage your breast.  Gently massage your breast before and during breastfeeding to help drain your milk.      Drink liquids as directed.  Ask how much liquid to drink each day and which liquids are best for you.      Rest as needed.  Do not sleep on your stomach until your infection is gone.     Prevent mastitis:   Breastfeed every 2 or 3 hours to prevent engorgement.  Breast engorgement develops when too much milk builds up in your breast. Take your time when you breastfeed to allow your baby to empty your breast. Try not to switch breasts too early. Express or pump after you breastfeed if your  baby is not emptying your breasts when he or she feeds.     Prevent sore and cracked nipples.  A good latch prevents sore and cracked nipples. If you have sore nipples after breastfeeding, your baby may not be latched on properly. Gently break suction and reposition if your baby is only sucking on the nipple. Talk to a lactation consultant if you need help with your baby's latch.      Care for your breasts.  Keep your nipples clean and dry between feedings. Check them for cracks, blisters, or other irritated areas. Ask a lactation specialist or your healthcare provider how to treat sore and cracked nipples. Wash your hands before and after you breastfeed your baby or pump your breasts. Wear a comfortable nursing bra that supports your breasts but is not too tight.     Follow up with your doctor as directed:  Write down your questions so you remember to ask them during your visits.  © Copyright [employer] 2023 Information is for End User's use only and may not be sold, redistributed or otherwise used for commercial purposes.  The above information is an  only. It is not intended as medical advice for individual conditions or treatments. Talk to your doctor, nurse or pharmacist before following any medical regimen to see if it is safe and effective for you.       Follow up with PCP in 3-5 days.  Proceed to  ER if symptoms worsen.    Chief Complaint     Chief Complaint   Patient presents with    Breast Pain     Patient states that last night she started with left breast pain and fever- she Is currently breast feeding her infant         History of Present Illness       24 year old female with no significant PMH presents for evaluation of left breast tenderness and fever which began yesterday. She reports T. Max of 101. She is 3 weeks postpartum and has been breast feeding exclusively. She denies engorged ducts, body aches, chills. Her baby has been feeding consistently.         Review of Systems   Review of  Systems   Constitutional:  Positive for fever. Negative for fatigue.   HENT:  Negative for congestion, ear discharge, ear pain, postnasal drip, rhinorrhea, sinus pressure, sinus pain, sneezing and sore throat.    Eyes: Negative.  Negative for pain, discharge, redness and itching.   Respiratory: Negative.  Negative for apnea, cough, choking, chest tightness, shortness of breath, wheezing and stridor.    Cardiovascular: Negative.  Negative for chest pain and palpitations.   Gastrointestinal: Negative.  Negative for diarrhea, nausea and vomiting.   Endocrine: Negative.  Negative for polydipsia, polyphagia and polyuria.   Genitourinary: Negative.  Negative for decreased urine volume and flank pain.   Musculoskeletal: Negative.  Negative for arthralgias, back pain, myalgias, neck pain and neck stiffness.   Skin:  Positive for color change. Negative for pallor, rash and wound.   Allergic/Immunologic: Negative.  Negative for environmental allergies.   Neurological: Negative.  Negative for dizziness, facial asymmetry, light-headedness, numbness and headaches.   Hematological: Negative.  Negative for adenopathy.   Psychiatric/Behavioral: Negative.           Current Medications       Current Outpatient Medications:     cephalexin (KEFLEX) 500 mg capsule, Take 1 capsule (500 mg total) by mouth every 8 (eight) hours for 5 days, Disp: 15 capsule, Rfl: 0    fluconazole (DIFLUCAN) 150 mg tablet, Take 1 tablet (150 mg total) by mouth once for 1 dose, Disp: 1 tablet, Rfl: 0    acetaminophen (TYLENOL) 325 mg tablet, Take 2 tablets (650 mg total) by mouth every 6 (six) hours as needed for mild pain or headaches, Disp: , Rfl:     hydrocortisone 1 % cream, Apply 1 Application topically daily as needed for irritation, Disp: , Rfl:     ibuprofen (MOTRIN) 600 mg tablet, Take 1 tablet (600 mg total) by mouth every 6 (six) hours as needed for moderate pain (cramping), Disp: , Rfl:     Prenatal Multivit-Min-Fe-FA (PRE- FORMULA PO), Take  by mouth, Disp: , Rfl:     witch hazel-glycerin (TUCKS) topical pad, Apply 1 Pad topically every 4 (four) hours as needed for irritation, Disp: , Rfl:     Current Allergies     Allergies as of 05/18/2024    (No Known Allergies)            The following portions of the patient's history were reviewed and updated as appropriate: allergies, current medications, past family history, past medical history, past social history, past surgical history and problem list.     Past Medical History:   Diagnosis Date    Anxiety     Depression     Miscarriage        Past Surgical History:   Procedure Laterality Date    WISDOM TOOTH EXTRACTION  2021       Family History   Problem Relation Age of Onset    No Known Problems Mother     No Known Problems Father     Migraines Maternal Grandmother     Diabetes Paternal Grandfather     Cancer Paternal Grandfather     Breast cancer Family 50        MGM's sister     Cervical cancer Neg Hx     Colon cancer Neg Hx     Ovarian cancer Neg Hx     Uterine cancer Neg Hx     Sudden death Neg Hx          Medications have been verified.        Objective   /74 (BP Location: Right arm, Patient Position: Sitting, Cuff Size: Standard)   Pulse (!) 128   Resp 18   LMP 07/05/2023 (Exact Date)   SpO2 98%        Physical Exam     Physical Exam  Vitals and nursing note reviewed.   Constitutional:       General: She is not in acute distress.     Appearance: Normal appearance. She is not ill-appearing.   HENT:      Head: Normocephalic and atraumatic.      Right Ear: External ear normal.      Left Ear: External ear normal.      Nose: Nose normal. No congestion or rhinorrhea.      Mouth/Throat:      Mouth: Mucous membranes are moist.   Eyes:      Extraocular Movements: Extraocular movements intact.      Conjunctiva/sclera: Conjunctivae normal.      Pupils: Pupils are equal, round, and reactive to light.   Cardiovascular:      Rate and Rhythm: Normal rate and regular rhythm.      Pulses: Normal pulses.       Heart sounds: Normal heart sounds. No murmur heard.     No friction rub. No gallop.   Pulmonary:      Effort: Pulmonary effort is normal. No respiratory distress.      Breath sounds: Normal breath sounds. No stridor. No wheezing, rhonchi or rales.   Chest:      Chest wall: Tenderness present. No mass, lacerations, deformity, swelling, crepitus or edema. There is no dullness to percussion.          Comments: (+) Warm and mild erythema at the 12:00 and 3:00 positions of left breast. No nipple discharge.   Abdominal:      General: Bowel sounds are normal.      Palpations: Abdomen is soft.      Tenderness: There is no abdominal tenderness. There is no guarding or rebound.   Musculoskeletal:         General: Normal range of motion.      Cervical back: Normal range of motion and neck supple. No tenderness.   Skin:     General: Skin is warm and dry.      Capillary Refill: Capillary refill takes less than 2 seconds.   Neurological:      General: No focal deficit present.      Mental Status: She is alert and oriented to person, place, and time.      Cranial Nerves: No cranial nerve deficit.   Psychiatric:         Mood and Affect: Mood normal.         Behavior: Behavior normal.

## 2024-05-21 NOTE — PROGRESS NOTES
Assessment/Plan:      Diagnoses and all orders for this visit:    Postpartum exam          Subjective:     Patient ID: Lizeth Birch is a 24 y.o. female.    Pt presents today for her PPV,  24  She has no complaints  Bleeding is minimal  No pain  Bowel and bladder are ok--treating hemorrhoid  Breastfeeding going well  Getting some sleep  Mood is good  Ppd score 1  Pt not interested in any BC  Rto 3 weeks        Review of Systems   Constitutional:  Negative for chills, fever and unexpected weight change.   HENT:  Negative for ear pain and sore throat.    Eyes:  Negative for pain and visual disturbance.   Respiratory:  Negative for cough and shortness of breath.    Cardiovascular:  Negative for chest pain and palpitations.   Gastrointestinal:  Negative for abdominal pain, blood in stool, constipation, diarrhea and vomiting.   Genitourinary: Negative.  Negative for dysuria and hematuria.   Musculoskeletal:  Negative for arthralgias and back pain.   Skin:  Negative for color change and rash.   Neurological:  Negative for seizures and syncope.   All other systems reviewed and are negative.        Objective:     Physical Exam  Vitals and nursing note reviewed.   Constitutional:       Appearance: She is well-developed.   Genitourinary:     Exam position: Supine.      Labia:         Right: No rash or lesion.         Left: No rash or lesion.       Vagina: Normal.      Cervix: No cervical motion tenderness, discharge or friability.   Lymphadenopathy:      Lower Body: No right inguinal adenopathy. No left inguinal adenopathy.

## 2024-06-11 ENCOUNTER — POSTPARTUM VISIT (OUTPATIENT)
Dept: OBGYN CLINIC | Facility: CLINIC | Age: 24
End: 2024-06-11
Payer: COMMERCIAL

## 2024-06-11 VITALS
DIASTOLIC BLOOD PRESSURE: 86 MMHG | BODY MASS INDEX: 31.94 KG/M2 | WEIGHT: 173.6 LBS | SYSTOLIC BLOOD PRESSURE: 110 MMHG | HEIGHT: 62 IN

## 2024-06-17 ENCOUNTER — TELEPHONE (OUTPATIENT)
Dept: POSTPARTUM | Facility: CLINIC | Age: 24
End: 2024-06-17

## 2024-06-17 NOTE — TELEPHONE ENCOUNTER
Juan is EBF.  He typically feeds every 2 hours ATC. Feedings have been comfortable, Juan is content after feeding and is growing well.   She is hoping to be able to find a way to get a longer stretch of sleep at night.  She is considering offering a bottle of formula once at night but is concerned that this will lead to engorgement or mastitis.   I reassured Lizeth that she can have her partner feed formula and there would be no need to get up and pump if she prefers not to and she can just nurse Juan when he wakes up.  We also discussed that she can pump after feeding earlier in the day to obtain milk for his bottle if she prefers. I offered an appointment for more evaluation of a feeding to see if there is another reason why Juan is still feeding this frequently and she accepted.   I encouraged her to call back with any additional questions or concerns.

## 2024-06-21 ENCOUNTER — OFFICE VISIT (OUTPATIENT)
Dept: POSTPARTUM | Facility: CLINIC | Age: 24
End: 2024-06-21
Payer: COMMERCIAL

## 2024-06-21 PROCEDURE — 99211 OFF/OP EST MAY X REQ PHY/QHP: CPT | Performed by: PEDIATRICS

## 2024-06-21 NOTE — PATIENT INSTRUCTIONS
-Watch for signs throughout the feeding that baby is effectively removing milk. You will feel strong tugging, hear swallowing and will feel your breasts get softer after nursing. Switch breasts when he shows decreased active drinking.   -No need to pump if baby is latching and feeding well at the breast on demand.   -Pump only as needed for missed feedings at the breast or for uncomfortable engorgement, utilize flange fit and settings that are comfortable for you, pumping should not be painful!   -When pumping in the morning, limit output to be just slight more than his typical bottle volumes.   -Cold compresses, like a bag of peas applied over a thin blanket or towel to the breast, is recommended for breast comfort and decreasing inflammation in the breast. You can also take ibuprofen as needed and consider a daily probiotic.   -Contact OB for fever, chills, and breast symptoms that do no resolve within 24-48 hours of supportive care.      - Storing and Thawing Breast Milk  [facility] Breastfeeding Support ([employer]gov)    -Follow up with Breastfeeding Medicine as scheduled.

## 2024-06-21 NOTE — PROGRESS NOTES
"INITIAL BREAST FEEDING EVALUATION    Informant/Relationship: Lizeth (mom/self)     Discussion of General Lactation Issues: Lizeth recently decided to start supplementing with formula for overnight feedings. She called for support with how to do this to avoid mastitis. She was recommended to come in to have a feeding evaluation after reporting that he is feeding every 2 hours ATC.     Infant is 8 weeks and 3 days old today.        History:  Fertility Problem:no  Breast changes:yes - enlargement, tenderness   : yes - went into labor naturally, AROM, She was in labor for 6 hours, pushed for 1 hours   Full term:yes - 39 and 5    labor:no  First nursing/attempt < 1 hour after birth:yes - within an hour, not immediately   Skin to skin following delivery:yes - immediately   Breast changes after delivery:yes - 2 days, she noticed colostrum early   Rooming in (infant in room with mother with exception of procedures, eg. Circumcision:  went to [facility] third day due to phototherapy, was only allowed out for feedings every 3 hours   Blood sugar issues:no  NICU stay:no  Jaundice:yes - photo  Phototherapy:yes - about 24 hours  Supplement given: (list supplement and method used as well as reason(s):yes - for jaundice, would \"top off\" after breastfeeding     Past Medical History:   Diagnosis Date    Anxiety     Depression     Miscarriage          Current Outpatient Medications:     acetaminophen (TYLENOL) 325 mg tablet, Take 2 tablets (650 mg total) by mouth every 6 (six) hours as needed for mild pain or headaches, Disp: , Rfl:     hydrocortisone 1 % cream, Apply 1 Application topically daily as needed for irritation, Disp: , Rfl:     ibuprofen (MOTRIN) 600 mg tablet, Take 1 tablet (600 mg total) by mouth every 6 (six) hours as needed for moderate pain (cramping), Disp: , Rfl:     Prenatal Multivit-Min-Fe-FA (PRE- FORMULA PO), Take by mouth, Disp: , Rfl:     witch hazel-glycerin (TUCKS) topical pad, Apply 1 " Pad topically every 4 (four) hours as needed for irritation, Disp: , Rfl:     No Known Allergies    Social History     Substance and Sexual Activity   Drug Use Never    Types: Marijuana       Social History     Interval Breastfeeding History:    Frequency of breast feeding: every 2 hours, longer stretches starting overnight   Does mother feel breastfeeding is effective: If no, explain: not sure due to the amount of time he's nursing. He pops on and off a lot and seems frustrated.   Does infant appear satisfied after nursing:Yes  Stooling pattern normal: lYes  Urinating frequently:Yes  Using shield or shells: No    Alternative/Artificial Feedings:   Bottle: Yes, Edward, mom is pacing   Cup: No  Syringe/Finger: No           Formula Type: Similac sensitive                      Amount: 2 oz             Breast Milk:                      Amount: 3 oz             Frequency Q 1 x in the day time to offer later in the day   Elimination Problems: No      Equipment:    Pump            Type: Momcozy             Frequency of Use: 2 x per day     AM pump is about 8 oz, later in the day 6 oz total     Equipment Problems: no    Mom:  Breast: Normal  Nipple Assessment in General: Normal: elongated/eraser, no discoloration and no damage noted.  Mother's Awareness of Feeding Cues                 Recognizes: Yes                  Verbalizes: Yes  Support System: good support   History of Breastfeeding: first time breastfeeding   Changes/Stressors/Violence: Lizeth is starting to give formula overnight to give herself a break and allow for more consecutive hours of sleep.   Concerns/Goals: Eventually may want to transition to formula. She'd like to work on helping [employer] feel full enough to go longer in between feedings.     Problems with Mom:     OBGyn Exam    Infant:  Behaviors: Alert  Color: Pink  Birth weight: 3225 g   Current weight: 5700 g     Problems with infant: tight frenulum, chomping suck motion       General Appearance:   Alert, active, no distress                             Head:  Normocephalic, AFOF, sutures opposed                             Eyes:  Conjunctiva clear, no drainage                              Ears:  Normally placed, no anomolies                             Nose:  Septum intact, no drainage or erythema                           Mouth:  No lesions. High, rounded palate.  Leukoplakia noted. Tip of tongue is curled under the tongue at rest. Tongue is bowl shaped when crying, left side elevates more than the right. Not willing to suck on my finger today. Manual lift shows v-shaped tongue tip, tight frenulum connected at the floor of the mouth and less than 1 cm from the tongue tip. Frenulum creates a speed bump under the tongue, blanches with lift.                     Neck:  Supple, symmetrical, trachea midline,                 Respiratory:  No grunting, flaring, retractions, breath sounds clear and equal            Cardiovascular:  Regular rate and rhythm. No murmur. Adequate perfusion/capillary refill. Femoral pulse present                    Abdomen:   Soft, non-tender, umbilical hernia              Genitourinary:  Normal male, testes descended, no discharge, swelling, or pain, anus patent                          Spine:   No abnormalities noted        Musculoskeletal:  Full range of motion          Skin/Hair/Nails:   Skin warm, dry, and intact, no rashes or abnormal dyspigmentation or lesions                Neurologic:   No abnormal movement, tone appropriate for gestational age     Latch:  Efficiency:               Lips Flanged: curled under on top and bottom initially, improved with manual adjustments               Depth of latch: wider with adjustments               Audible Swallow: Yes, more               Visible Milk: Yes              Wide Open/ Asymmetrical: Yes              Suck Swallow Cycle: Breathing: yes, Coordinated: yes  Nipple Assessment after latch: Normal: elongated/eraser, no discoloration  and no damage noted.  Latch Problems: He is able to have a wide-appearing attachment with some adjustments. Chomping jaw motion noted.     Position:  Infant's Ergonomics/Body               Body Alignment: Yes               Head Supported: Yes               Close to Mom's body/ Lifted/ Supported: Yes               Mom's Ergonomics/Body: Yes                           Supported: Yes                           Sitting Back: Yes                           Brings Baby to her breast: Yes  Positioning Problems: None       Handouts:   Storing human milk and Paced bottle feeding    Education:  Reviewed Latch: importance of deep latch without pain.   Reviewed Positioning for Dyad: proper alignment and head angle when positioning at the breast   Reviewed Frequency/Supply & Demand: offer the breast at each feeding, pump if baby is not latching and effective transferring milk.   Reviewed Infant:Cues and varied States of Awareness: watch for hunger cues, feed on demand. If baby seems satisfied at the breast (calm, relaxed sleeping, breasts are softer) no need to pump or supplement   Reviewed Infant Elimination: goal of 6+ wets and 2-3 stools per day   Reviewed Alternative/Artificial Feedings: paced bottle feeding technique demonstrated  Reviewed Mom/Breast care: gentle handling of the breast at all times, discussed lymphatic drainage and reverse pressure softening, as well as tips for healing sore nipples.    Reviewed Equipment: Hand pump and electric pump general guidance, Discussed proper flange fit, how to measure        Plan:  Continue to offer baby  the breast on demand. Switch breasts and perform gentle breast compressions throughout feeding to keep baby active, as needed. Paced bottle feeding recommended if offering pumped milk or formula via bottle.  Monitor diapers daily, follow up with Ped as recommended. Follow up with lactation as needed for ongoing support. Visit scheduled with Breastfeeding Medicine.     Reassurance  "provided that baby is growing well at this time. Cont with good positioning technique and watch for signs of effective feeding. Pump if wanting to replace feeding at the breast with bottle feeding or if latching becomes painful. Pumping output should not exceed infants bottle intake, recommended saving only small \"extra\" volumes per day to avoid oversupply. Gentle handling of the breast at all times to preserve integrity.  Contact [facility] for breastfeeding support as needed or ongoing concerns with latching comfort and milk transfer. Follow up with Breastfeeding Medicine as scheduled.     I have spent 60 minutes with Patient and family today in which greater than 50% of this time was spent in counseling/coordination of care regarding Patient and family education.         "

## 2024-06-21 NOTE — PROGRESS NOTES
I have reviewed the notes, assessments, and/or procedures performed by Ann Luo RN, IBCLC, I concur with her/his documentation of Lizeth Bruce MD 06/21/24

## 2024-06-28 NOTE — PROGRESS NOTES
Assessment/Plan:      There are no diagnoses linked to this encounter.      Subjective:     Patient ID: Lizeth Birch is a 24 y.o. female.    Pt presents today for her PPV,  -  She has no complaints  Bleeding is resolved  No pain  Bowel and bladder are ok  Breastfeeding going well  Getting some sleep  Mood is good  Ppd score 1  Would like condoms for BC    Pap not done today  Brent discussed            Review of Systems   Constitutional:  Negative for chills, fever and unexpected weight change.   HENT:  Negative for ear pain and sore throat.    Eyes:  Negative for pain and visual disturbance.   Respiratory:  Negative for cough and shortness of breath.    Cardiovascular:  Negative for chest pain and palpitations.   Gastrointestinal:  Negative for abdominal pain, blood in stool, constipation, diarrhea and vomiting.   Genitourinary: Negative.  Negative for dysuria and hematuria.   Musculoskeletal:  Negative for arthralgias and back pain.   Skin:  Negative for color change and rash.   Neurological:  Negative for seizures and syncope.   All other systems reviewed and are negative.        Objective:     Physical Exam  Vitals and nursing note reviewed.   Constitutional:       Appearance: Normal appearance. She is well-developed.   HENT:      Head: Normocephalic and atraumatic.   Chest:   Breasts:     Right: No inverted nipple, mass, nipple discharge or skin change.      Left: No inverted nipple, mass, nipple discharge or skin change.   Abdominal:      Palpations: Abdomen is soft.   Genitourinary:     Exam position: Supine.      Labia:         Right: No rash, tenderness or lesion.         Left: No rash, tenderness or lesion.       Vagina: Normal.      Cervix: No cervical motion tenderness, discharge or friability.      Adnexa:         Right: No mass, tenderness or fullness.          Left: No mass, tenderness or fullness.     Musculoskeletal:      Cervical back: Normal range of motion.   Lymphadenopathy:       Lower Body: No right inguinal adenopathy. No left inguinal adenopathy.   Neurological:      Mental Status: She is alert.

## 2024-07-02 DIAGNOSIS — R23.3 EASY BRUISING: Primary | ICD-10-CM

## 2024-07-03 ENCOUNTER — APPOINTMENT (OUTPATIENT)
Dept: LAB | Facility: CLINIC | Age: 24
End: 2024-07-03
Payer: COMMERCIAL

## 2024-07-03 DIAGNOSIS — R23.3 EASY BRUISING: ICD-10-CM

## 2024-07-03 LAB
25(OH)D3 SERPL-MCNC: 31.8 NG/ML (ref 30–100)
BASOPHILS # BLD AUTO: 0.02 THOUSANDS/ÂΜL (ref 0–0.1)
BASOPHILS NFR BLD AUTO: 0 % (ref 0–1)
EOSINOPHIL # BLD AUTO: 0.18 THOUSAND/ÂΜL (ref 0–0.61)
EOSINOPHIL NFR BLD AUTO: 4 % (ref 0–6)
ERYTHROCYTE [DISTWIDTH] IN BLOOD BY AUTOMATED COUNT: 12.6 % (ref 11.6–15.1)
FERRITIN SERPL-MCNC: 74 NG/ML (ref 11–307)
HCT VFR BLD AUTO: 41.7 % (ref 34.8–46.1)
HGB BLD-MCNC: 13.4 G/DL (ref 11.5–15.4)
IMM GRANULOCYTES # BLD AUTO: 0.01 THOUSAND/UL (ref 0–0.2)
IMM GRANULOCYTES NFR BLD AUTO: 0 % (ref 0–2)
INR PPP: 0.92 (ref 0.84–1.19)
IRON SATN MFR SERPL: 27 % (ref 15–50)
IRON SERPL-MCNC: 82 UG/DL (ref 50–212)
LYMPHOCYTES # BLD AUTO: 2.41 THOUSANDS/ÂΜL (ref 0.6–4.47)
LYMPHOCYTES NFR BLD AUTO: 51 % (ref 14–44)
MCH RBC QN AUTO: 28 PG (ref 26.8–34.3)
MCHC RBC AUTO-ENTMCNC: 32.1 G/DL (ref 31.4–37.4)
MCV RBC AUTO: 87 FL (ref 82–98)
MONOCYTES # BLD AUTO: 0.41 THOUSAND/ÂΜL (ref 0.17–1.22)
MONOCYTES NFR BLD AUTO: 9 % (ref 4–12)
NEUTROPHILS # BLD AUTO: 1.73 THOUSANDS/ÂΜL (ref 1.85–7.62)
NEUTS SEG NFR BLD AUTO: 36 % (ref 43–75)
NRBC BLD AUTO-RTO: 0 /100 WBCS
PLATELET # BLD AUTO: 242 THOUSANDS/UL (ref 149–390)
PMV BLD AUTO: 10.5 FL (ref 8.9–12.7)
PROTHROMBIN TIME: 12.9 SECONDS (ref 11.6–14.5)
RBC # BLD AUTO: 4.79 MILLION/UL (ref 3.81–5.12)
TIBC SERPL-MCNC: 302 UG/DL (ref 250–450)
UIBC SERPL-MCNC: 220 UG/DL (ref 155–355)
VIT B12 SERPL-MCNC: 518 PG/ML (ref 180–914)
WBC # BLD AUTO: 4.76 THOUSAND/UL (ref 4.31–10.16)

## 2024-07-03 PROCEDURE — 83540 ASSAY OF IRON: CPT

## 2024-07-03 PROCEDURE — 82607 VITAMIN B-12: CPT

## 2024-07-03 PROCEDURE — 36415 COLL VENOUS BLD VENIPUNCTURE: CPT

## 2024-07-03 PROCEDURE — 83550 IRON BINDING TEST: CPT

## 2024-07-03 PROCEDURE — 85610 PROTHROMBIN TIME: CPT

## 2024-07-03 PROCEDURE — 85025 COMPLETE CBC W/AUTO DIFF WBC: CPT | Performed by: FAMILY MEDICINE

## 2024-07-03 PROCEDURE — 82306 VITAMIN D 25 HYDROXY: CPT

## 2024-07-03 PROCEDURE — 82728 ASSAY OF FERRITIN: CPT

## 2024-07-16 ENCOUNTER — OFFICE VISIT (OUTPATIENT)
Dept: FAMILY MEDICINE CLINIC | Facility: CLINIC | Age: 24
End: 2024-07-16
Payer: COMMERCIAL

## 2024-07-16 VITALS
SYSTOLIC BLOOD PRESSURE: 116 MMHG | BODY MASS INDEX: 31.83 KG/M2 | OXYGEN SATURATION: 97 % | HEIGHT: 62 IN | DIASTOLIC BLOOD PRESSURE: 74 MMHG | HEART RATE: 94 BPM | WEIGHT: 173 LBS

## 2024-07-16 DIAGNOSIS — R23.3 EASY BRUISING: Primary | ICD-10-CM

## 2024-07-16 PROCEDURE — 99213 OFFICE O/P EST LOW 20 MIN: CPT | Performed by: FAMILY MEDICINE

## 2024-08-10 ENCOUNTER — OFFICE VISIT (OUTPATIENT)
Dept: URGENT CARE | Age: 24
End: 2024-08-10
Payer: COMMERCIAL

## 2024-08-10 VITALS
OXYGEN SATURATION: 98 % | HEART RATE: 70 BPM | TEMPERATURE: 97.3 F | SYSTOLIC BLOOD PRESSURE: 116 MMHG | DIASTOLIC BLOOD PRESSURE: 80 MMHG | RESPIRATION RATE: 20 BRPM

## 2024-08-10 DIAGNOSIS — L03.039 PARONYCHIA OF GREAT TOE: Primary | ICD-10-CM

## 2024-08-10 PROCEDURE — 99214 OFFICE O/P EST MOD 30 MIN: CPT | Performed by: PHYSICIAN ASSISTANT

## 2024-08-10 RX ORDER — CEPHALEXIN 500 MG/1
500 CAPSULE ORAL EVERY 6 HOURS SCHEDULED
Qty: 28 CAPSULE | Refills: 0 | Status: SHIPPED | OUTPATIENT
Start: 2024-08-10 | End: 2024-08-17

## 2024-08-10 NOTE — PROGRESS NOTES
[facility]        NAME: Lizeth Birch is a 24 y.o. female  : 2000    MRN: [id number]  DATE: August 10, 2024  TIME: 11:39 AM    Assessment and Plan   Paronychia of great toe [L03.039]  1. Paronychia of great toe  cephalexin (KEFLEX) 500 mg capsule        Patient Instructions     Take medicine as prescribed  Warm soaks/compresses  F/u with podiatry if sxs persit  Follow up with PCP in 3-5 days.  Proceed to  ER if symptoms worsen.    If tests have been performed at [facility], our office will contact you with results if changes need to be made to the care plan discussed with you at the visit.  [facility]    Chief Complaint     Chief Complaint   Patient presents with    Ingrown Toenail     Reports pain over past 2-3 days. Redness started this morning. Small amount of drainage reported clear in color. Denies fevers.      History of Present Illness       25yo F presents c/o pain surrounding left great toenail x 2-3 days.  Patient reports she noticed redness surrounding medial aspect of nail this am with scant moisture draining from medial aspect of nail. She denies impaired rom, fever, chills, fatigue, flu-like sxs.  Pain is exacerbated by touch or weight bearing.        Review of Systems   Review of Systems   Constitutional:  Negative for chills, diaphoresis, fatigue and fever.   Respiratory:  Negative for shortness of breath.    Cardiovascular:  Negative for chest pain.   Musculoskeletal:  Negative for arthralgias and myalgias.   Skin:  Positive for color change. Negative for pallor, rash and wound.         Current Medications       Current Outpatient Medications:     acetaminophen (TYLENOL) 325 mg tablet, Take 2 tablets (650 mg total) by mouth every 6 (six) hours as needed for mild pain or headaches, Disp: , Rfl:     cephalexin (KEFLEX) 500 mg capsule, Take 1 capsule (500 mg total) by mouth every 6 (six) hours for 7 days, Disp: 28 capsule, Rfl: 0     hydrocortisone 1 % cream, Apply 1 Application topically daily as needed for irritation, Disp: , Rfl:     ibuprofen (MOTRIN) 600 mg tablet, Take 1 tablet (600 mg total) by mouth every 6 (six) hours as needed for moderate pain (cramping), Disp: , Rfl:     Prenatal Multivit-Min-Fe-FA (PRE- FORMULA PO), Take by mouth, Disp: , Rfl:     witch hazel-glycerin (TUCKS) topical pad, Apply 1 Pad topically every 4 (four) hours as needed for irritation, Disp: , Rfl:     Current Allergies     Allergies as of 08/10/2024    (No Known Allergies)            The following portions of the patient's history were reviewed and updated as appropriate: allergies, current medications, past family history, past medical history, past social history, past surgical history and problem list.     Past Medical History:   Diagnosis Date    Anxiety     Depression     Miscarriage        Past Surgical History:   Procedure Laterality Date    WISDOM TOOTH EXTRACTION         Family History   Problem Relation Age of Onset    No Known Problems Mother     No Known Problems Father     Migraines Maternal Grandmother     Diabetes Paternal Grandfather     Cancer Paternal Grandfather     Breast cancer Family 50        MGM's sister     Cervical cancer Neg Hx     Colon cancer Neg Hx     Ovarian cancer Neg Hx     Uterine cancer Neg Hx     Sudden death Neg Hx          Medications have been verified.        Objective   /80   Pulse 70   Temp (!) 97.3 °F (36.3 °C) (Temporal)   Resp 20   SpO2 98%   No LMP recorded.       Physical Exam     Physical Exam  Constitutional:       Appearance: Normal appearance.   Cardiovascular:      Rate and Rhythm: Normal rate and regular rhythm.   Pulmonary:      Effort: Pulmonary effort is normal.      Breath sounds: Normal breath sounds.   Feet:      Comments: TTP, mild warmth to touch, erythema and edema to medial perionychium of left great toe; no collection of fluid, discharge, or impaired ROM of toe  Neurological:       Mental Status: She is alert.

## 2024-08-28 ENCOUNTER — TELEPHONE (OUTPATIENT)
Age: 24
End: 2024-08-28

## 2024-08-28 NOTE — TELEPHONE ENCOUNTER
Called the pt and advised she needs a physical and she said she needs a ppd not a tdap I scheduled her

## 2024-09-03 ENCOUNTER — APPOINTMENT (OUTPATIENT)
Dept: LAB | Facility: CLINIC | Age: 24
End: 2024-09-03
Payer: COMMERCIAL

## 2024-09-03 ENCOUNTER — OFFICE VISIT (OUTPATIENT)
Dept: FAMILY MEDICINE CLINIC | Facility: CLINIC | Age: 24
End: 2024-09-03
Payer: COMMERCIAL

## 2024-09-03 VITALS
SYSTOLIC BLOOD PRESSURE: 118 MMHG | DIASTOLIC BLOOD PRESSURE: 76 MMHG | BODY MASS INDEX: 32.57 KG/M2 | HEIGHT: 62 IN | WEIGHT: 177 LBS | HEART RATE: 79 BPM | RESPIRATION RATE: 16 BRPM | OXYGEN SATURATION: 98 %

## 2024-09-03 DIAGNOSIS — Z11.1 SCREENING-PULMONARY TB: ICD-10-CM

## 2024-09-03 DIAGNOSIS — Z00.00 ANNUAL PHYSICAL EXAM: Primary | ICD-10-CM

## 2024-09-03 DIAGNOSIS — R23.3 EASY BRUISING: ICD-10-CM

## 2024-09-03 DIAGNOSIS — E55.9 VITAMIN D INSUFFICIENCY: ICD-10-CM

## 2024-09-03 DIAGNOSIS — E05.90 SUBCLINICAL HYPERTHYROIDISM: ICD-10-CM

## 2024-09-03 PROCEDURE — 86480 TB TEST CELL IMMUN MEASURE: CPT

## 2024-09-03 PROCEDURE — 36415 COLL VENOUS BLD VENIPUNCTURE: CPT

## 2024-09-03 PROCEDURE — 99395 PREV VISIT EST AGE 18-39: CPT | Performed by: FAMILY MEDICINE

## 2024-09-03 NOTE — PROGRESS NOTES
"Assessment/Plan:   Diagnoses and all orders for this visit:    Annual physical exam  -     Comprehensive metabolic panel  - reviewed PMHx, PSHx, meds, allergies, FHx, Soc Hx and Sexual Hx   - UTD with DTaP 2020  - UTD with COVID IMMs but no Booster   - advised HPV series - pt to think about it   - due for routine screening labs - script given   - declined STD screening in the office today   - follows with [facility] - advised to schedule annual   - UTD with Cervical Ca screening (10/2023)   - due for Breast Ca screening at 40yo   - due for Colon Ca screening at 43yo   - discussed diet and exercise   - UTD with Optho and Dental   - RTO in 1yr for annual or as needed - pt aware and agreeable     Vitamin D insufficiency - resolved     Easy bruising  - 2/2 carrying baby car seat   - stable CBC and Fe panel     Subclinical hyperthyroidism  -     TSH, 3rd generation with Free T4 reflex    Screening-pulmonary TB  -     Quantiferon TB Gold Plus Assay; Future    BMI 32.0-32.9,adult  -     Lipid panel; Future          Subjective:    Patient ID: Lizeth Birch is a 24 y.o. female.  Lizeth Birch is a 24 y.o. female who presents to the office for an annual exam  - PMHx: migraines,  x1 (2024)   - allergies: NKDA  - Meds: none -- no longer breastfeeding   - PSHx: wisdom teeth extraction   - FHx: PGF (DM, Cancer), denies FHx of cervical, colon, ovarian, uterine ca or sudden cardiac death  - Immunizations: DTaP 2020, COVID IMMs but no Booster, UTD with Varicella   - GYN Hx: [facility] - has to schedule her annual   - Hm: EGD/Cscope 3/2020 - Dr Eller - gastritis   - diet/exercise: exercise: GYM, diet: \"anything and everything\"   - social: denies tob/illicts, drinks 1x/week    - sexual Hx: sexually active with BF, using condoms, declined STD screening   - last vision: wears glasses PRN   - last dental: has Invisalin   - denies F/C/N/V/CP/palpitations/SOB/wheezing/abd pain/D/C/LE edema " "        The following portions of the patient's history were reviewed and updated as appropriate: allergies, current medications, past family history, past medical history, past social history, past surgical history and problem list.    Review of Systems  as per HPI    Objective:  /76 (BP Location: Right arm, Patient Position: Sitting, Cuff Size: Standard)   Pulse 79   Resp 16   Ht 5' 2\" (1.575 m)   Wt 80.3 kg (177 lb)   LMP 08/30/2024 (Exact Date)   SpO2 98%   Breastfeeding No   BMI 32.37 kg/m²    Physical Exam  Vitals reviewed.   Constitutional:       General: She is not in acute distress.     Appearance: Normal appearance. She is not ill-appearing, toxic-appearing or diaphoretic.   HENT:      Head: Normocephalic and atraumatic.      Right Ear: External ear normal.      Left Ear: External ear normal.      Nose: Nose normal.   Eyes:      General: No scleral icterus.        Right eye: No discharge.         Left eye: No discharge.      Extraocular Movements: Extraocular movements intact.      Conjunctiva/sclera: Conjunctivae normal.   Cardiovascular:      Rate and Rhythm: Normal rate and regular rhythm.      Heart sounds: Normal heart sounds.   Pulmonary:      Effort: Pulmonary effort is normal. No respiratory distress.      Breath sounds: Normal breath sounds. No stridor. No wheezing, rhonchi or rales.   Abdominal:      Palpations: Abdomen is soft.   Musculoskeletal:         General: Normal range of motion.      Cervical back: Normal range of motion.      Right lower leg: No edema.      Left lower leg: No edema.   Skin:     General: Skin is warm.   Neurological:      General: No focal deficit present.      Mental Status: She is alert and oriented to person, place, and time.   Psychiatric:         Mood and Affect: Mood normal.         Behavior: Behavior normal.         "

## 2024-09-03 NOTE — PATIENT INSTRUCTIONS
"Patient Education     Routine physical for adults   The Basics   Written by the doctors and editors at [facility]   What is a physical? -- A physical is a routine visit, or \"check-up,\" with your doctor. You might also hear it called a \"wellness visit\" or \"preventive visit.\"  During each visit, the doctor will:   Ask about your physical and mental health   Ask about your habits, behaviors, and lifestyle   Do an exam   Give you vaccines if needed   Talk to you about any medicines you take   Give advice about your health   Answer your questions  Getting regular check-ups is an important part of taking care of your health. It can help your doctor find and treat any problems you have. But it's also important for preventing health problems.  A routine physical is different from a \"sick visit.\" A sick visit is when you see a doctor because of a health concern or problem. Since physicals are scheduled ahead of time, you can think about what you want to ask the doctor.  How often should I get a physical? -- It depends on your age and health. In general, for people age 21 years and older:   If you are younger than 50 years, you might be able to get a physical every 3 years.   If you are 50 years or older, your doctor might recommend a physical every year.  If you have an ongoing health condition, like diabetes or high blood pressure, your doctor will probably want to see you more often.  What happens during a physical? -- In general, each visit will include:   Physical exam - The doctor or nurse will check your height, weight, heart rate, and blood pressure. They will also look at your eyes and ears. They will ask about how you are feeling and whether you have any symptoms that bother you.   Medicines - It's a good idea to bring a list of all the medicines you take to each doctor visit. Your doctor will talk to you about your medicines and answer any questions. Tell them if you are having any side effects that bother you. You " "should also tell them if you are having trouble paying for any of your medicines.   Habits and behaviors - This includes:   Your diet   Your exercise habits   Whether you smoke, drink alcohol, or use drugs   Whether you are sexually active   Whether you feel safe at home  Your doctor will talk to you about things you can do to improve your health and lower your risk of health problems. They will also offer help and support. For example, if you want to quit smoking, they can give you advice and might prescribe medicines. If you want to improve your diet or get more physical activity, they can help you with this, too.   Lab tests, if needed - The tests you get will depend on your age and situation. For example, your doctor might want to check your:   Cholesterol   Blood sugar   Iron level   Vaccines - The recommended vaccines will depend on your age, health, and what vaccines you already had. Vaccines are very important because they can prevent certain serious or deadly infections.   Discussion of screening - \"Screening\" means checking for diseases or other health problems before they cause symptoms. Your doctor can recommend screening based on your age, risk, and preferences. This might include tests to check for:   Cancer, such as breast, prostate, cervical, ovarian, colorectal, prostate, lung, or skin cancer   Sexually transmitted infections, such as chlamydia and gonorrhea   Mental health conditions like depression and anxiety  Your doctor will talk to you about the different types of screening tests. They can help you decide which screenings to have. They can also explain what the results might mean.   Answering questions - The physical is a good time to ask the doctor or nurse questions about your health. If needed, they can refer you to other doctors or specialists, too.  Adults older than 65 years often need other care, too. As you get older, your doctor will talk to you about:   How to prevent falling at " home   Hearing or vision tests   Memory testing   How to take your medicines safely   Making sure that you have the help and support you need at home  All topics are updated as new evidence becomes available and our peer review process is complete.  This topic retrieved from [employer] on: May 02, 2024.  Topic 345370 Version 1.0  Release: 32.4.3 - C32.122  © 2024 UpToDate, Inc. and/or its affiliates. All rights reserved.  Consumer Information Use and Disclaimer   Disclaimer: This generalized information is a limited summary of diagnosis, treatment, and/or medication information. It is not meant to be comprehensive and should be used as a tool to help the user understand and/or assess potential diagnostic and treatment options. It does NOT include all information about conditions, treatments, medications, side effects, or risks that may apply to a specific patient. It is not intended to be medical advice or a substitute for the medical advice, diagnosis, or treatment of a health care provider based on the health care provider's examination and assessment of a patient's specific and unique circumstances. Patients must speak with a health care provider for complete information about their health, medical questions, and treatment options, including any risks or benefits regarding use of medications. This information does not endorse any treatments or medications as safe, effective, or approved for treating a specific patient. UpToDate, Inc. and its affiliates disclaim any warranty or liability relating to this information or the use thereof.The use of this information is governed by the Terms of Use, available at https://www.[employer].com/en/know/clinical-effectiveness-terms. 2024© UpToDate, Inc. and its affiliates and/or licensors. All rights reserved.  Copyright   © 2024 UpToDate, Inc. and/or its affiliates. All rights reserved.

## 2024-09-04 LAB
GAMMA INTERFERON BACKGROUND BLD IA-ACNC: 2.19 IU/ML
M TB IFN-G BLD-IMP: NEGATIVE
M TB IFN-G CD4+ BCKGRND COR BLD-ACNC: -2.13 IU/ML
M TB IFN-G CD4+ BCKGRND COR BLD-ACNC: -2.14 IU/ML
MITOGEN IGNF BCKGRD COR BLD-ACNC: 7.81 IU/ML

## 2024-09-05 ENCOUNTER — TELEPHONE (OUTPATIENT)
Dept: FAMILY MEDICINE CLINIC | Facility: CLINIC | Age: 24
End: 2024-09-05

## 2024-09-09 ENCOUNTER — NURSE TRIAGE (OUTPATIENT)
Age: 24
End: 2024-09-09

## 2024-09-09 ENCOUNTER — OFFICE VISIT (OUTPATIENT)
Dept: OBGYN CLINIC | Facility: CLINIC | Age: 24
End: 2024-09-09
Payer: COMMERCIAL

## 2024-09-09 VITALS
DIASTOLIC BLOOD PRESSURE: 78 MMHG | SYSTOLIC BLOOD PRESSURE: 120 MMHG | BODY MASS INDEX: 32.2 KG/M2 | WEIGHT: 175 LBS | HEIGHT: 62 IN

## 2024-09-09 DIAGNOSIS — N89.8 VAGINAL IRRITATION: Primary | ICD-10-CM

## 2024-09-09 PROCEDURE — 99214 OFFICE O/P EST MOD 30 MIN: CPT | Performed by: PHYSICIAN ASSISTANT

## 2024-09-09 PROCEDURE — 87077 CULTURE AEROBIC IDENTIFY: CPT | Performed by: PHYSICIAN ASSISTANT

## 2024-09-09 PROCEDURE — 87070 CULTURE OTHR SPECIMN AEROBIC: CPT | Performed by: PHYSICIAN ASSISTANT

## 2024-09-09 PROCEDURE — 87186 SC STD MICRODIL/AGAR DIL: CPT | Performed by: PHYSICIAN ASSISTANT

## 2024-09-09 NOTE — TELEPHONE ENCOUNTER
"Spoke with patient who reports she was on keflex a few weeks ago for an infected toenail.  She reports she also took diflucan with it as she is prone to yeast infections.    She got her period after that and it seems to have continued.  She has been doing the Monistat 3 x2 without relief as well.  She reports white vaginal discharge, irritation and redness at the vaginal opening and itching.  She denies any fevers or other symptoms.  She was advised she has to have the monistat out of her system for a few days.  Will have  call to schedule, as no appointments available until October.   She was advised to try coconut oil to see if that provides any relief.  No further questions or concerns at this time.      Reason for Disposition   Patient wants to be seen    Answer Assessment - Initial Assessment Questions  1. SYMPTOM: \"What's the main symptom you're concerned about?\" (e.g., pain, itching, dryness)      Irritation, redness  2. LOCATION: \"Where is this located?\" (e.g., inside/outside, left/right)      Vaginal opening  3. ONSET: \"When did this  start?\"      Last Monday  4. PAIN: \"Is there any pain?\" If Yes, ask: \"How bad is it?\" (Scale: 1-10; mild, moderate, severe)      Mild  5. ITCHING: \"Is there any itching?\" If Yes, ask: \"How bad is it?\" (Scale: 1-10; mild, moderate, severe)      mild  6. CAUSE: \"What do you think is causing the discharge?\" \"Have you had the same problem before? What happened then?\"      unsure  7. OTHER SYMPTOMS: \"Do you have any other symptoms?\" (e.g., fever, itching, vaginal bleeding, pain with urination, injury to genital area, vaginal foreign body)      Irritation, redness  8. PREGNANCY: \"Is there any chance you are pregnant?\" \"When was your last menstrual period?\"      8/30/24    Protocols used: Vaginal Symptoms-ADULT-[place]    "

## 2024-09-09 NOTE — PROGRESS NOTES
Ambulatory Visit  Name: Lizeth Birch      : 2000      MRN: [id number]  Encounter Provider: Cookie Zaidi PA-C  Encounter Date: 2024   Encounter department: [facility]    Assessment & Plan   1. Vaginal irritation  -     Genital Comprehensive Culture      History of Present Illness     Lizeth Birch is a 24 y.o. female who presents with vaginal symptoms.   Pt was on abx previously approx 3 weeks ago. She finished the medication then began with itching. She used Diflucan x 1 sx improved then got menses. After her period again had irritation and itching. Also notes some redness at vaginal opening.   No urinary sx. No fevers/chills, no new partners. No problems w IC. No real discharge.   Pt uses of brand of pads, those are not new.   No new soaps, detergents/shampoos.     Review of Systems   Constitutional: Negative.    Respiratory: Negative.     Cardiovascular: Negative.    Gastrointestinal: Negative.    Genitourinary:  Negative for dysuria, hematuria, pelvic pain, vaginal discharge and vaginal pain.        + vulvar irritation   Musculoskeletal: Negative.    Psychiatric/Behavioral: Negative.       Pertinent Medical History     Past Medical History   Past Medical History:   Diagnosis Date   • Anxiety    • Depression    • Miscarriage      Past Surgical History:   Procedure Laterality Date   • WISDOM TOOTH EXTRACTION       Family History   Problem Relation Age of Onset   • No Known Problems Mother    • No Known Problems Father    • Migraines Maternal Grandmother    • Diabetes Paternal Grandfather    • Cancer Paternal Grandfather    • Breast cancer Family 50        MGM's sister    • Cervical cancer Neg Hx    • Colon cancer Neg Hx    • Ovarian cancer Neg Hx    • Uterine cancer Neg Hx    • Sudden death Neg Hx      Current Outpatient Medications on File Prior to Visit   Medication Sig Dispense Refill   • acetaminophen (TYLENOL) 325 mg tablet Take 2 tablets (650 mg total) by mouth  "every 6 (six) hours as needed for mild pain or headaches     • ibuprofen (MOTRIN) 600 mg tablet Take 1 tablet (600 mg total) by mouth every 6 (six) hours as needed for moderate pain (cramping)     • Prenatal Multivit-Min-Fe-FA (PRE-EMIR FORMULA PO) Take by mouth     • hydrocortisone 1 % cream Apply 1 Application topically daily as needed for irritation (Patient not taking: Reported on 2024)     • witch hazel-glycerin (TUCKS) topical pad Apply 1 Pad topically every 4 (four) hours as needed for irritation (Patient not taking: Reported on 2024)       No current facility-administered medications on file prior to visit.   No Known Allergies   Current Outpatient Medications on File Prior to Visit   Medication Sig Dispense Refill   • acetaminophen (TYLENOL) 325 mg tablet Take 2 tablets (650 mg total) by mouth every 6 (six) hours as needed for mild pain or headaches     • ibuprofen (MOTRIN) 600 mg tablet Take 1 tablet (600 mg total) by mouth every 6 (six) hours as needed for moderate pain (cramping)     • Prenatal Multivit-Min-Fe-FA (PRE- FORMULA PO) Take by mouth     • hydrocortisone 1 % cream Apply 1 Application topically daily as needed for irritation (Patient not taking: Reported on 2024)     • witch hazel-glycerin (TUCKS) topical pad Apply 1 Pad topically every 4 (four) hours as needed for irritation (Patient not taking: Reported on 2024)       No current facility-administered medications on file prior to visit.      Social History     Tobacco Use   • Smoking status: Never   • Smokeless tobacco: Never   Vaping Use   • Vaping status: Never Used   Substance and Sexual Activity   • Alcohol use: Not Currently   • Drug use: Never     Types: Marijuana   • Sexual activity: Yes     Partners: Male     Birth control/protection: None     Objective     /78 (BP Location: Left arm, Patient Position: Sitting, Cuff Size: Standard)   Ht 5' 2\" (1.575 m)   Wt 79.4 kg (175 lb)   LMP 2024 (Exact Date)  "  BMI 32.01 kg/m²     Physical Exam  Vitals reviewed.   Constitutional:       Appearance: She is normal weight.   HENT:      Head: Normocephalic and atraumatic.   Cardiovascular:      Rate and Rhythm: Normal rate and regular rhythm.   Pulmonary:      Effort: Pulmonary effort is normal.      Breath sounds: Normal breath sounds.   Genitourinary:     Comments: Slight erythema noted at posterior vaginal introitus.   Vaginal vault with thick white substance present could be c/w monisitat application last evening. No erythema or internal edema noted.   Skin:     General: Skin is warm and dry.   Neurological:      Mental Status: She is alert.   Psychiatric:         Mood and Affect: Mood normal.         Behavior: Behavior normal.         Thought Content: Thought content normal.         Judgment: Judgment normal.       Administrative Statements

## 2024-09-12 ENCOUNTER — TELEPHONE (OUTPATIENT)
Age: 24
End: 2024-09-12

## 2024-09-12 ENCOUNTER — HOSPITAL ENCOUNTER (EMERGENCY)
Facility: HOSPITAL | Age: 24
Discharge: HOME | End: 2024-09-12
Attending: EMERGENCY MEDICINE
Payer: COMMERCIAL

## 2024-09-12 ENCOUNTER — PATIENT MESSAGE (OUTPATIENT)
Dept: OBGYN CLINIC | Facility: CLINIC | Age: 24
End: 2024-09-12

## 2024-09-12 VITALS
HEART RATE: 93 BPM | SYSTOLIC BLOOD PRESSURE: 132 MMHG | TEMPERATURE: 99.2 F | OXYGEN SATURATION: 99 % | RESPIRATION RATE: 18 BRPM | DIASTOLIC BLOOD PRESSURE: 85 MMHG

## 2024-09-12 DIAGNOSIS — N76.0 ACUTE VAGINITIS: ICD-10-CM

## 2024-09-12 DIAGNOSIS — Z16.24 MULTIPLE DRUG RESISTANT ORGANISM (MDRO) CULTURE POSITIVE: Primary | ICD-10-CM

## 2024-09-12 DIAGNOSIS — B34.9 VIRAL SYNDROME: Primary | ICD-10-CM

## 2024-09-12 LAB
ALBUMIN SERPL BCG-MCNC: 4.4 G/DL (ref 3.5–5)
ALP SERPL-CCNC: 90 U/L (ref 34–104)
ALT SERPL W P-5'-P-CCNC: 10 U/L (ref 7–52)
ANION GAP SERPL CALCULATED.3IONS-SCNC: 6 MMOL/L (ref 4–13)
AST SERPL W P-5'-P-CCNC: 16 U/L (ref 13–39)
BASOPHILS # BLD AUTO: 0.03 THOUSANDS/ΜL (ref 0–0.1)
BASOPHILS NFR BLD AUTO: 1 % (ref 0–1)
BILIRUB SERPL-MCNC: 0.32 MG/DL (ref 0.2–1)
BUN SERPL-MCNC: 14 MG/DL (ref 5–25)
CALCIUM SERPL-MCNC: 9.4 MG/DL (ref 8.4–10.2)
CHLORIDE SERPL-SCNC: 103 MMOL/L (ref 96–108)
CO2 SERPL-SCNC: 28 MMOL/L (ref 21–32)
CREAT SERPL-MCNC: 0.8 MG/DL (ref 0.6–1.3)
EOSINOPHIL # BLD AUTO: 0.12 THOUSAND/ΜL (ref 0–0.61)
EOSINOPHIL NFR BLD AUTO: 2 % (ref 0–6)
ERYTHROCYTE [DISTWIDTH] IN BLOOD BY AUTOMATED COUNT: 12 % (ref 11.6–15.1)
FLUAV AG UPPER RESP QL IA.RAPID: NEGATIVE
FLUBV AG UPPER RESP QL IA.RAPID: NEGATIVE
GFR SERPL CREATININE-BSD FRML MDRD: 102 ML/MIN/1.73SQ M
GLUCOSE SERPL-MCNC: 89 MG/DL (ref 65–140)
HCT VFR BLD AUTO: 41.7 % (ref 34.8–46.1)
HGB BLD-MCNC: 13.7 G/DL (ref 11.5–15.4)
IMM GRANULOCYTES # BLD AUTO: 0.01 THOUSAND/UL (ref 0–0.2)
IMM GRANULOCYTES NFR BLD AUTO: 0 % (ref 0–2)
LYMPHOCYTES # BLD AUTO: 3.14 THOUSANDS/ΜL (ref 0.6–4.47)
LYMPHOCYTES NFR BLD AUTO: 49 % (ref 14–44)
MCH RBC QN AUTO: 28.3 PG (ref 26.8–34.3)
MCHC RBC AUTO-ENTMCNC: 32.9 G/DL (ref 31.4–37.4)
MCV RBC AUTO: 86 FL (ref 82–98)
MONOCYTES # BLD AUTO: 0.48 THOUSAND/ΜL (ref 0.17–1.22)
MONOCYTES NFR BLD AUTO: 7 % (ref 4–12)
NEUTROPHILS # BLD AUTO: 2.67 THOUSANDS/ΜL (ref 1.85–7.62)
NEUTS SEG NFR BLD AUTO: 41 % (ref 43–75)
NRBC BLD AUTO-RTO: 0 /100 WBCS
PLATELET # BLD AUTO: 274 THOUSANDS/UL (ref 149–390)
PMV BLD AUTO: 10 FL (ref 8.9–12.7)
POTASSIUM SERPL-SCNC: 4.3 MMOL/L (ref 3.5–5.3)
PROT SERPL-MCNC: 7.9 G/DL (ref 6.4–8.4)
RBC # BLD AUTO: 4.84 MILLION/UL (ref 3.81–5.12)
S PYO DNA THROAT QL NAA+PROBE: NOT DETECTED
SARS-COV+SARS-COV-2 AG RESP QL IA.RAPID: NEGATIVE
SODIUM SERPL-SCNC: 137 MMOL/L (ref 135–147)
WBC # BLD AUTO: 6.45 THOUSAND/UL (ref 4.31–10.16)

## 2024-09-12 PROCEDURE — 96374 THER/PROPH/DIAG INJ IV PUSH: CPT

## 2024-09-12 PROCEDURE — 99283 EMERGENCY DEPT VISIT LOW MDM: CPT

## 2024-09-12 PROCEDURE — 85025 COMPLETE CBC W/AUTO DIFF WBC: CPT | Performed by: EMERGENCY MEDICINE

## 2024-09-12 PROCEDURE — 99284 EMERGENCY DEPT VISIT MOD MDM: CPT | Performed by: EMERGENCY MEDICINE

## 2024-09-12 PROCEDURE — 87651 STREP A DNA AMP PROBE: CPT | Performed by: EMERGENCY MEDICINE

## 2024-09-12 PROCEDURE — 80053 COMPREHEN METABOLIC PANEL: CPT | Performed by: EMERGENCY MEDICINE

## 2024-09-12 PROCEDURE — 87804 INFLUENZA ASSAY W/OPTIC: CPT | Performed by: EMERGENCY MEDICINE

## 2024-09-12 PROCEDURE — 36415 COLL VENOUS BLD VENIPUNCTURE: CPT | Performed by: EMERGENCY MEDICINE

## 2024-09-12 PROCEDURE — 87811 SARS-COV-2 COVID19 W/OPTIC: CPT | Performed by: EMERGENCY MEDICINE

## 2024-09-12 RX ORDER — ONDANSETRON 2 MG/ML
4 INJECTION INTRAMUSCULAR; INTRAVENOUS ONCE
Status: COMPLETED | OUTPATIENT
Start: 2024-09-12 | End: 2024-09-12

## 2024-09-12 RX ORDER — ONDANSETRON 4 MG/1
4 TABLET, ORALLY DISINTEGRATING ORAL EVERY 6 HOURS PRN
Qty: 20 TABLET | Refills: 0 | Status: SHIPPED | OUTPATIENT
Start: 2024-09-12 | End: 2024-09-16

## 2024-09-12 RX ORDER — TETRACYCLINE HYDROCHLORIDE 500 MG/1
500 CAPSULE ORAL 2 TIMES DAILY
Qty: 14 CAPSULE | Refills: 0 | Status: SHIPPED | OUTPATIENT
Start: 2024-09-12 | End: 2024-09-19

## 2024-09-12 RX ORDER — ONDANSETRON 4 MG/1
4 TABLET, ORALLY DISINTEGRATING ORAL EVERY 6 HOURS PRN
Qty: 16 TABLET | Refills: 0 | Status: SHIPPED | OUTPATIENT
Start: 2024-09-12 | End: 2024-09-12

## 2024-09-12 RX ADMIN — ONDANSETRON 4 MG: 2 INJECTION INTRAMUSCULAR; INTRAVENOUS at 18:47

## 2024-09-12 NOTE — TELEPHONE ENCOUNTER
Pt calling with questions regarding test results. RN advised provider has not reviewed results yet. Will send a message to provider requesting to review and place recommendations. Staff will return a call to patient. Phone number on file verified. No further questions.

## 2024-09-12 NOTE — DISCHARGE INSTRUCTIONS
Today Lizeth Birch was seen in the emergency department for flu like symptoms. Emergency department workup included labs, viral testing. I believe the symptoms to be the result of a viral syndrome. At this time there does not appear to be an emergent life threatening cause to explain these symptoms. Lizeth is stable for discharge with outpatient follow up.     Please follow up with your primary care provider in the week. If a specialist referral was placed for you please call them tomorrow to be seen at the next available appointment. Please review all results discussed today with your primary care provider and/or specialist.    Please return to the emergency department as soon as possible if you develop uncontrollable fevers (Temp >100.4F), uncontrollable pain, vomiting, chest pain, trouble breathing, weakness on one side of your body, slurred speech, vision changes or any other concerning symptoms.     Thank you for choosing [facility] for your care.

## 2024-09-12 NOTE — ED PROVIDER NOTES
"1. Viral syndrome      ED Disposition       ED Disposition   Discharge    Condition   Stable    Date/Time   u Sep 12, 2024  8:04 PM    Comment   Lizeth Eyal discharge to home/self care.                   Assessment & Plan       Medical Decision Making  Patient with history as below presented to triage with CC of \" Patient presents with:  Medical Problem: Pt reports seeing gyno due to pelvic discomfort and was swabbed and came back for Klebsiella. Now having sore throat and congestion.    \"  Hx obtained from pt. Exam as below.    This 24-year-old otherwise healthy female patient presents with symptoms suspicious for likely viral upper respiratory infection. Differential includes bacterial pneumonia, sinusitis, allergic rhinitis, strep pharyngitis, viral illness (COVID/flu).  Patient with recently diagnosed Klebsiella pneumoniae positive vaginal culture, likely chronically contaminated from urine.  Otherwise no acute urinary symptoms.  Do not suspect sepsis, pyelonephritis, acute surgical intra-abdominal infection, PID.  Underlying cardiopulmonary process. I considered, but think unlikely, dangerous causes of this patient's symptoms to include ACS, CHF or COPD exacerbations, pneumonia, pneumothorax. Patient is nontoxic appearing, afebrile, lungs are clear, belly nontender, no CVA tenderness.    Plan: labs, antiemetics, reassessment, over the counter medications, discharge with PCP follow up       I have independently ordered, reviewed and interpreted the following: labs and/or imaging studies  Reviewed external records including notes, and prior labs/imaging results.    Disposition: Patient stable for outpatient management. Discussed need for follow up with their primary doctor or specialist to review all results, including incidental findings as below. Patient discharged with explanation of ED workup and diagnosis, instructions on how to obtain outpatient follow up, care instructions at home, and strict return " precautions if patient develops new or worsening symptoms. Patients questions answered and agreeable with discharge plan.     See ED Course for further MDM.      PLEASE NOTE:  This encounter was completed utilizing the [employer] Direct Speech Voice Recognition Software. Grammatical errors, random word insertions, pronoun errors and incomplete sentences are occasional inherent consequences of the system due to software limitations, ambient noise and hardware issues.These may be missed by proof reading prior to affixing electronic signature. Any questions or concerns about the content, text or information contained within the body of this dictation should be directly addressed to the physician for clarification. Please do not hesitate to call me directly if you have any questions or concerns.     Amount and/or Complexity of Data Reviewed  External Data Reviewed: labs and notes.  Labs: ordered. Decision-making details documented in ED Course.    Risk  Prescription drug management.      This is          ED Course as of 09/12/24 2155   Thu Sep 12, 2024   1911 CBC and differential(!)  No leukocytosis.  No anemia.  Platelets WNL.   1938 FLU/COVID Rapid Antigen (30 min. TAT) - Preferred screening test in ED  Neg   1938 Comprehensive metabolic panel  WNL   2001 STREP A PCR: Not Detected       Medications   ondansetron (ZOFRAN) injection 4 mg (4 mg Intravenous Given 9/12/24 1847)       History of Present Illness       24-year-old female with no significant past medical history presents to the ED with complaints of URI symptoms over the past week.  Patient reports congestion, sore throat, mild headache now resolved, and persistent nausea.  Has not taken anything over-the-counter for relief of her symptoms.  Has had worsening nausea, without vomiting or associated abdominal pain/diarrhea.  Was recently diagnosed with vaginitis positive for Klebsiella pneumoniae and due to start tetracycline treatment.  Otherwise having  mild white-colored vaginal discharge but pelvic pain resolved, and patient without urinary symptoms.  Patient wants to know if the two processes are related.  Denies fever, chills, vision changes, neck pain/stiffness cough, lymphadenopathy, chest pain, shortness of breath, rash, extremity swelling/pain.            Review of Systems   Constitutional:  Positive for appetite change and fatigue. Negative for chills and fever.   HENT:  Positive for congestion, postnasal drip and sore throat. Negative for dental problem, ear discharge, ear pain, mouth sores, sinus pressure, sinus pain, tinnitus, trouble swallowing and voice change.    Eyes:  Negative for photophobia, pain, redness and visual disturbance.   Respiratory:  Negative for cough, chest tightness and shortness of breath.    Cardiovascular:  Negative for chest pain and palpitations.   Gastrointestinal:  Positive for nausea. Negative for abdominal pain, constipation, diarrhea and vomiting.   Genitourinary:  Positive for vaginal discharge. Negative for difficulty urinating, dysuria, flank pain, frequency, hematuria, pelvic pain, urgency, vaginal bleeding and vaginal pain.   Musculoskeletal:  Negative for arthralgias, back pain, neck pain and neck stiffness.   Skin:  Negative for color change and rash.   Neurological:  Positive for headaches. Negative for dizziness, seizures, syncope, light-headedness and numbness.   All other systems reviewed and are negative.          Objective     ED Triage Vitals [09/12/24 1728]   Temperature Pulse Blood Pressure Respirations SpO2 Patient Position - Orthostatic VS   99.2 °F (37.3 °C) 93 132/85 18 99 % Sitting      Temp Source Heart Rate Source BP Location FiO2 (%) Pain Score    Oral Monitor Right arm -- --        Physical Exam  Vitals and nursing note reviewed.   Constitutional:       General: She is not in acute distress.     Appearance: She is well-developed. She is not toxic-appearing.   HENT:      Head: Normocephalic and  atraumatic.      Right Ear: External ear normal.      Left Ear: External ear normal.      Nose: Congestion present.      Mouth/Throat:      Mouth: Mucous membranes are moist.      Pharynx: Oropharynx is clear. Posterior oropharyngeal erythema (mild) present. No oropharyngeal exudate.      Tonsils: No tonsillar exudate or tonsillar abscesses.   Eyes:      Extraocular Movements: Extraocular movements intact.      Conjunctiva/sclera: Conjunctivae normal.      Pupils: Pupils are equal, round, and reactive to light.   Cardiovascular:      Rate and Rhythm: Normal rate and regular rhythm.      Pulses: Normal pulses.      Heart sounds: Normal heart sounds. No murmur heard.  Pulmonary:      Effort: Pulmonary effort is normal. No respiratory distress.      Breath sounds: Normal breath sounds. No stridor. No wheezing, rhonchi or rales.   Abdominal:      General: Bowel sounds are normal.      Palpations: Abdomen is soft.      Tenderness: There is no abdominal tenderness. There is no right CVA tenderness, left CVA tenderness, guarding or rebound.   Musculoskeletal:         General: No swelling, tenderness or deformity.      Cervical back: Normal range of motion and neck supple. No rigidity or tenderness.      Right lower leg: No edema.      Left lower leg: No edema.   Lymphadenopathy:      Cervical: No cervical adenopathy.   Skin:     General: Skin is warm and dry.      Capillary Refill: Capillary refill takes less than 2 seconds.      Coloration: Skin is not jaundiced or pale.      Findings: No bruising, erythema, lesion or rash.   Neurological:      General: No focal deficit present.      Mental Status: She is alert and oriented to person, place, and time. Mental status is at baseline.      GCS: GCS eye subscore is 4. GCS verbal subscore is 5. GCS motor subscore is 6.      Cranial Nerves: Cranial nerves 2-12 are intact. No cranial nerve deficit, dysarthria or facial asymmetry.      Sensory: Sensation is intact. No sensory  deficit.      Motor: Motor function is intact. No weakness.      Coordination: Coordination is intact. Coordination normal.      Gait: Gait is intact.   Psychiatric:         Mood and Affect: Mood normal.         Behavior: Behavior normal.         Thought Content: Thought content normal.         Labs Reviewed   CBC AND DIFFERENTIAL - Abnormal       Result Value    WBC 6.45      RBC 4.84      Hemoglobin 13.7      Hematocrit 41.7      MCV 86      MCH 28.3      MCHC 32.9      RDW 12.0      MPV 10.0      Platelets 274      nRBC 0      Segmented % 41 (*)     Immature Grans % 0      Lymphocytes % 49 (*)     Monocytes % 7      Eosinophils Relative 2      Basophils Relative 1      Absolute Neutrophils 2.67      Absolute Immature Grans 0.01      Absolute Lymphocytes 3.14      Absolute Monocytes 0.48      Eosinophils Absolute 0.12      Basophils Absolute 0.03     COVID-19/INFLUENZA A/B RAPID ANTIGEN (30 MIN.TAT) - Normal    SARS COV Rapid Antigen Negative      Influenza A Rapid Antigen Negative      Influenza B Rapid Antigen Negative      Narrative:     This test has been performed using the Durianaidel Arianne 2 FLU+SARS Antigen test under the Emergency Use Authorization (EUA). This test has been validated by the  and verified by the performing laboratory. The Arianne uses lateral flow immunofluorescent sandwich assay to detect SARS-COV, Influenza A and Influenza B Antigen.     The Quidel Arianne 2 SARS Antigen test does not differentiate between SARS-CoV and SARS-CoV-2.     Negative results are presumptive and may be confirmed with a molecular assay, if necessary, for patient management. Negative results do not rule out SARS-CoV-2 or influenza infection and should not be used as the sole basis for treatment or patient management decisions. A negative test result may occur if the level of antigen in a sample is below the limit of detection of this test.     Positive results are indicative of the presence of viral antigens,  but do not rule out bacterial infection or co-infection with other viruses.     All test results should be used as an adjunct to clinical observations and other information available to the provider.    FOR PEDIATRIC PATIENTS - copy/paste COVID Guidelines URL to browser: [email or web address]   STREP A PCR - Normal    STREP A PCR Not Detected     COMPREHENSIVE METABOLIC PANEL    Sodium 137      Potassium 4.3      Chloride 103      CO2 28      ANION GAP 6      BUN 14      Creatinine 0.80      Glucose 89      Calcium 9.4      AST 16      ALT 10      Alkaline Phosphatase 90      Total Protein 7.9      Albumin 4.4      Total Bilirubin 0.32      eGFR 102      Narrative:     National Kidney Disease Foundation guidelines for Chronic Kidney Disease (CKD):     Stage 1 with normal or high GFR (GFR > 90 mL/min/1.73 square meters)    Stage 2 Mild CKD (GFR = 60-89 mL/min/1.73 square meters)    Stage 3A Moderate CKD (GFR = 45-59 mL/min/1.73 square meters)    Stage 3B Moderate CKD (GFR = 30-44 mL/min/1.73 square meters)    Stage 4 Severe CKD (GFR = 15-29 mL/min/1.73 square meters)    Stage 5 End Stage CKD (GFR <15 mL/min/1.73 square meters)  Note: GFR calculation is accurate only with a steady state creatinine     No orders to display       Procedures         Carlos Alberto Love DO  09/12/24 1914

## 2024-09-12 NOTE — PATIENT COMMUNICATION
Incoming call from patient. Patient states that she received message from provider regarding results. Patient is concerned due to MDRO. Patient inquiring if medication that was prescribed will help her or if she needs to be evaluated further in the ED or elsewhere.     Patient states that she is still overall feeling unwell - fatigue, nausea, chills (no fever).     Patient also had 4 month old baby and message from provider stated limiting exposure to people who are immunocompromised - patient is concerned for baby and would like any specific recommendations regarding this.      Routing to provider for review.

## 2024-09-12 NOTE — TELEPHONE ENCOUNTER
Added note to be processed urgent    PA for tetracycline 500 MG SUBMITTED     via    []CMM-KEY:   [x]Surescripts-Case ID #   []Faxed to plan   []Other website   []Phone call Case ID #     Office notes sent, clinical questions answered. Awaiting determination    Turnaround time for your insurance to make a decision on your Prior Authorization can take 7-21 business days.

## 2024-09-12 NOTE — Clinical Note
Lizeth Birch was seen and treated in our emergency department on 9/12/2024.                Diagnosis:     Lizeth  .    She may return on this date: 09/14/2024         If you have any questions or concerns, please don't hesitate to call.      Carlos Alberto Love, DO    ______________________________           _______________          _______________  Hospital Representative                              Date                                Time

## 2024-09-13 LAB — BACTERIA GENITAL AEROBE CULT: ABNORMAL

## 2024-09-13 NOTE — ED ATTENDING ATTESTATION
9/12/2024  I, Desire Caraballo MD, saw and evaluated the patient. I have discussed the patient with the resident/non-physician practitioner and agree with the resident's/non-physician practitioner's findings, Plan of Care, and MDM as documented in the resident's/non-physician practitioner's note, except where noted. All available labs and Radiology studies were reviewed.  I was present for key portions of any procedure(s) performed by the resident/non-physician practitioner and I was immediately available to provide assistance.       At this point I agree with the current assessment done in the Emergency Department.  I have conducted an independent evaluation of this patient a history and physical is as follows:    24-year-old female presenting to emergency department due to right nose congestion cough and nausea.  No fevers.  Has some chills.  No abdominal pain.  No urine complaints.  No chest pain.  No short of breath.  Cough is nonproductive.    This is likely viral syndrome, agree with swabs, likely outpatient follow-up    ED Course         Critical Care Time  Procedures

## 2024-09-16 RX ORDER — ONDANSETRON 4 MG/1
4 TABLET, ORALLY DISINTEGRATING ORAL EVERY 6 HOURS PRN
Qty: 20 TABLET | Refills: 0 | Status: SHIPPED | OUTPATIENT
Start: 2024-09-16

## 2024-09-16 NOTE — TELEPHONE ENCOUNTER
PA for tetracycline 500 MG APPROVED     Date(s) approved  September 7, 2024 to October 12, 2024     Case #    Patient advised by          [x][employer] Message  []Phone call   [x]LMOM  []L/M to call office as no active Communication consent on file  []Unable to leave detailed message as VM not approved on Communication consent       Pharmacy advised by    [x]Fax  []Phone call    Approval letter scanned into Media Yes

## 2024-09-19 NOTE — PROGRESS NOTES
Abel Birch is a 24 y.o. female who presents for annual GYN exam.     GYN:  S/P  2024. First menses was August; lasting 7 days, CD1-4 was heavier with changing tampons every 3 hours.  Denies vaginal discharge, labial erythema or lesions, dyspareunia.  Contraception: condoms.  Patient is  sexually active with  partner.      OB:  OB History    Para Term  AB Living   2 1 1   1 1   SAB IAB Ectopic Multiple Live Births   1     0 1      # Outcome Date GA Lbr Ricky/2nd Weight Sex Type Anes PTL Lv   2 Term 24 39w5d / 01:02 3225 g (7 lb 1.8 oz) M Vag-Spont EPI  JUANITA   1 SAB 2023 6w0d             Obstetric Comments    CF/SMA neg; Hgb electrophoresis WNL; varicella not immune         :  Denies dysuria, urinary frequency or urgency.  Denies hematuria, flank pain, incontinence.    Breast:  Recently discontinued breastfeeding  Denies breast mass, skin changes, dimpling, reddening, nipple retraction.  Denies breast discharge.  Patient does  have a family history of breast, endometrial, colon, or ovarian ca.     Cancer-related family history includes Breast cancer (age of onset: 50) in her family; Cancer in her paternal grandfather. There is no history of Cervical cancer, Colon cancer, Ovarian cancer, or Uterine cancer.    Past Medical History:   Diagnosis Date    Anxiety     Depression     Miscarriage        Past Surgical History:   Procedure Laterality Date    WISDOM TOOTH EXTRACTION           General:  Safety: feels safe at home    Social History     Tobacco Use    Smoking status: Never    Smokeless tobacco: Never   Vaping Use    Vaping status: Never Used   Substance Use Topics    Alcohol use: Not Currently    Drug use: Never     Types: Marijuana       Screening:  Cervical cancer: last pap smear in 10/12/2023. Results were NILM.       Review of Systems   Constitutional:  Negative for fatigue.   Eyes:  Negative for photophobia and visual disturbance.   Respiratory:  Negative  "for cough and shortness of breath.    Cardiovascular:  Negative for chest pain and palpitations.   Gastrointestinal:  Negative for abdominal pain, blood in stool, constipation, diarrhea, nausea and rectal pain.   Genitourinary:  Negative for dyspareunia, dysuria, flank pain, frequency, genital sores, menstrual problem, pelvic pain, urgency, vaginal bleeding, vaginal discharge and vaginal pain.   Musculoskeletal:  Negative for arthralgias and back pain.   Skin:  Negative for rash.   Neurological:  Negative for weakness and headaches.          Objective      /68 (BP Location: Left arm, Patient Position: Sitting, Cuff Size: Standard)   Ht 5' 2\" (1.575 m)   Wt 79.4 kg (175 lb)   LMP 08/30/2024 (Exact Date)   BMI 32.01 kg/m²   Physical Exam  Vitals and nursing note reviewed.   Constitutional:       Appearance: Normal appearance.   HENT:      Head: Normocephalic.   Eyes:      Conjunctiva/sclera: Conjunctivae normal.   Cardiovascular:      Rate and Rhythm: Normal rate and regular rhythm.      Heart sounds: Normal heart sounds.   Pulmonary:      Effort: Pulmonary effort is normal.      Breath sounds: Normal breath sounds.   Chest:   Breasts:     Right: Normal. No inverted nipple, mass, nipple discharge, skin change or tenderness.      Left: Normal. No inverted nipple, mass, nipple discharge, skin change or tenderness.   Abdominal:      General: Abdomen is flat.      Palpations: Abdomen is soft. There is no mass.      Tenderness: There is no abdominal tenderness. There is no right CVA tenderness or left CVA tenderness.   Genitourinary:     General: Normal vulva.      Exam position: Lithotomy position.      Pubic Area: No rash or pubic lice.       Labia:         Right: No rash or tenderness.         Left: No rash or tenderness.       Urethra: No prolapse or urethral pain.      Vagina: Normal. No vaginal discharge.      Cervix: Normal.      Uterus: Normal.       Adnexa: Right adnexa normal and left adnexa normal.   "      Right: No mass or tenderness.          Left: No mass or tenderness.        Comments: No abnormal discharge noted.  Musculoskeletal:         General: Normal range of motion.      Cervical back: Normal range of motion. No tenderness.      Right lower leg: No edema.      Left lower leg: No edema.   Lymphadenopathy:      Cervical: No cervical adenopathy.      Upper Body:      Right upper body: No supraclavicular or axillary adenopathy.      Left upper body: No supraclavicular or axillary adenopathy.      Lower Body: No right inguinal adenopathy. No left inguinal adenopathy.   Skin:     General: Skin is warm and dry.      Findings: No rash.   Neurological:      Mental Status: She is alert and oriented to person, place, and time.   Psychiatric:         Mood and Affect: Mood normal.         Behavior: Behavior normal.         Thought Content: Thought content normal.         Judgment: Judgment normal.                 Assessment/Plan  Problem List Items Addressed This Visit    None  Visit Diagnoses       Well woman exam    -  Primary    Subacute vaginitis        Relevant Orders    Genital Comprehensive Culture            Reviewed GYN concerns today of recent Klebsiella infection. Patient desires rescreening. No PE today. Cx collected, will treat based on results.  Birth control: continue utilizing condoms  Cervical cancer screening: deferred   STD screening: declines  Reviewed healthy lifestyle and safe sex practices  RTO for annual exam or PRN      ELYSE Dao  OB/GYN  9/20/2024  10:27 AM

## 2024-09-20 ENCOUNTER — ANNUAL EXAM (OUTPATIENT)
Dept: OBGYN CLINIC | Facility: CLINIC | Age: 24
End: 2024-09-20
Payer: COMMERCIAL

## 2024-09-20 VITALS
BODY MASS INDEX: 32.2 KG/M2 | DIASTOLIC BLOOD PRESSURE: 68 MMHG | WEIGHT: 175 LBS | SYSTOLIC BLOOD PRESSURE: 110 MMHG | HEIGHT: 62 IN

## 2024-09-20 DIAGNOSIS — Z01.419 WELL WOMAN EXAM: Primary | ICD-10-CM

## 2024-09-20 DIAGNOSIS — N76.1 SUBACUTE VAGINITIS: ICD-10-CM

## 2024-09-20 PROCEDURE — 99395 PREV VISIT EST AGE 18-39: CPT

## 2024-09-20 PROCEDURE — 87070 CULTURE OTHR SPECIMN AEROBIC: CPT

## 2024-09-21 ENCOUNTER — APPOINTMENT (OUTPATIENT)
Dept: LAB | Facility: CLINIC | Age: 24
End: 2024-09-21
Payer: COMMERCIAL

## 2024-09-21 LAB
ALBUMIN SERPL BCG-MCNC: 4.1 G/DL (ref 3.5–5)
ALP SERPL-CCNC: 99 U/L (ref 34–104)
ALT SERPL W P-5'-P-CCNC: 17 U/L (ref 7–52)
ANION GAP SERPL CALCULATED.3IONS-SCNC: 5 MMOL/L (ref 4–13)
AST SERPL W P-5'-P-CCNC: 22 U/L (ref 13–39)
BILIRUB SERPL-MCNC: 0.6 MG/DL (ref 0.2–1)
BUN SERPL-MCNC: 13 MG/DL (ref 5–25)
CALCIUM SERPL-MCNC: 9 MG/DL (ref 8.4–10.2)
CHLORIDE SERPL-SCNC: 105 MMOL/L (ref 96–108)
CHOLEST SERPL-MCNC: 173 MG/DL
CO2 SERPL-SCNC: 26 MMOL/L (ref 21–32)
CREAT SERPL-MCNC: 0.81 MG/DL (ref 0.6–1.3)
GFR SERPL CREATININE-BSD FRML MDRD: 101 ML/MIN/1.73SQ M
GLUCOSE P FAST SERPL-MCNC: 95 MG/DL (ref 65–99)
HDLC SERPL-MCNC: 68 MG/DL
LDLC SERPL CALC-MCNC: 95 MG/DL (ref 0–100)
NONHDLC SERPL-MCNC: 105 MG/DL
POTASSIUM SERPL-SCNC: 4 MMOL/L (ref 3.5–5.3)
PROT SERPL-MCNC: 7.2 G/DL (ref 6.4–8.4)
SODIUM SERPL-SCNC: 136 MMOL/L (ref 135–147)
T4 FREE SERPL-MCNC: 0.9 NG/DL (ref 0.61–1.12)
TRIGL SERPL-MCNC: 50 MG/DL
TSH SERPL DL<=0.05 MIU/L-ACNC: 0.33 UIU/ML (ref 0.45–4.5)

## 2024-09-21 PROCEDURE — 80061 LIPID PANEL: CPT

## 2024-09-22 LAB — BACTERIA GENITAL AEROBE CULT: NORMAL

## 2024-09-23 LAB — BACTERIA GENITAL AEROBE CULT: NORMAL

## 2024-09-26 ENCOUNTER — TELEPHONE (OUTPATIENT)
Dept: FAMILY MEDICINE CLINIC | Facility: CLINIC | Age: 24
End: 2024-09-26

## 2024-09-26 DIAGNOSIS — E05.90 SUBCLINICAL HYPERTHYROIDISM: Primary | ICD-10-CM

## 2024-09-26 NOTE — TELEPHONE ENCOUNTER
----- Message from Sheron Galvez DO sent at 9/26/2024  5:53 PM EDT -----  Stable Lipids = repeat annually

## 2024-09-26 NOTE — TELEPHONE ENCOUNTER
----- Message from Sheron Galvez DO sent at 9/26/2024  6:01 PM EDT -----  Low TSH but stable T4 - will repeat in 1month   Stable CMP and Lipids

## 2024-10-04 ENCOUNTER — OFFICE VISIT (OUTPATIENT)
Dept: FAMILY MEDICINE CLINIC | Facility: CLINIC | Age: 24
End: 2024-10-04
Payer: COMMERCIAL

## 2024-10-04 VITALS
DIASTOLIC BLOOD PRESSURE: 82 MMHG | HEART RATE: 73 BPM | HEIGHT: 62 IN | SYSTOLIC BLOOD PRESSURE: 118 MMHG | WEIGHT: 173 LBS | RESPIRATION RATE: 16 BRPM | BODY MASS INDEX: 31.83 KG/M2 | OXYGEN SATURATION: 98 %

## 2024-10-04 DIAGNOSIS — L28.2 PRURITIC RASH: ICD-10-CM

## 2024-10-04 DIAGNOSIS — L30.0 NUMMULAR ECZEMA: Primary | ICD-10-CM

## 2024-10-04 PROCEDURE — 99213 OFFICE O/P EST LOW 20 MIN: CPT | Performed by: FAMILY MEDICINE

## 2024-10-04 RX ORDER — BETAMETHASONE VALERATE 1.2 MG/G
CREAM TOPICAL 2 TIMES DAILY
Qty: 45 G | Refills: 0 | Status: SHIPPED | OUTPATIENT
Start: 2024-10-04

## 2024-10-04 NOTE — PROGRESS NOTES
Outpatient Note- Follow up     HPI:     Lizeth Birch , 24 y.o. female  presents today for rash on right arm.  The patient noted illness starting about 2 weeks ago, during the URI symptoms, she noticed a rash on the rest of her right arm.  She also had several papules on the hand as well.  The papule on her rash has resolved, the circular patch of dry skin that is itchy has remained.  She denies any recent exposures including metals, detergents, soaps, clothing, or straps around the wrist. She denies any fever, chills, nausea, vomiting, diarrhea, constipation, or other upper respiratory concerns.     Past Medical History:   Diagnosis Date    Anxiety     Depression     Miscarriage       ROS:   Review of Systems   See HPI    OBJECTIVE  Vitals:    10/04/24 1538   BP: 118/82   Pulse: 73   Resp: 16   SpO2: 98%        Physical Exam  Constitutional:       General: She is not in acute distress.     Appearance: Normal appearance. She is not ill-appearing, toxic-appearing or diaphoretic.   HENT:      Head: Normocephalic and atraumatic.   Eyes:      General:         Right eye: No discharge.         Left eye: No discharge.   Pulmonary:      Effort: Pulmonary effort is normal. No respiratory distress.   Skin:     General: Skin is warm.      Findings: Erythema and rash present.   Neurological:      Mental Status: She is alert.            ASSESSMENT AND PLAN   Lizeth was seen today for rash.  Diagnoses and all orders for this visit:    Nummular eczema  Pruritic rash  Patient looks to have a small patch of eczema.  It is possibly nummular due to its shape.  It could also be associated with stress.  She should use the moderate potency steroid to the area twice a day for the next several days.  If she is not having improvement after 1 week, she is to call for further evaluation.  It is possible that the papular rash she was describing was a dyshidrotic eczema, she can use this cream as well if she has an exacerbation of the  symptoms.  -     betamethasone valerate (VALISONE) 0.1 % cream; Apply topically 2 (two) times a day             DO [facility]  10/4/2024 4:47 PM

## 2024-10-21 ENCOUNTER — TELEPHONE (OUTPATIENT)
Age: 24
End: 2024-10-21

## 2024-10-21 NOTE — TELEPHONE ENCOUNTER
Patient has been added to the Talk Therapy wait list without a referral.    Insurance: [employer]  Insurance Type:    Commercial []   Medicaid [x]   [facility] (if applicable)   Medicare []  Location Preference: anywhere  Provider Preference: non  Virtual: Yes [] No [x]  Were outside resources sent: Yes [] No [x]

## 2024-10-26 ENCOUNTER — APPOINTMENT (OUTPATIENT)
Dept: LAB | Facility: CLINIC | Age: 24
End: 2024-10-26
Payer: COMMERCIAL

## 2024-10-26 DIAGNOSIS — E05.90 SUBCLINICAL HYPERTHYROIDISM: ICD-10-CM

## 2024-10-26 LAB
T4 FREE SERPL-MCNC: 0.84 NG/DL (ref 0.61–1.12)
TSH SERPL DL<=0.05 MIU/L-ACNC: 0.45 UIU/ML (ref 0.45–4.5)

## 2024-10-26 PROCEDURE — 36415 COLL VENOUS BLD VENIPUNCTURE: CPT

## 2024-10-26 PROCEDURE — 84439 ASSAY OF FREE THYROXINE: CPT

## 2024-10-26 PROCEDURE — 84443 ASSAY THYROID STIM HORMONE: CPT

## 2024-10-29 ENCOUNTER — TELEPHONE (OUTPATIENT)
Dept: FAMILY MEDICINE CLINIC | Facility: CLINIC | Age: 24
End: 2024-10-29

## 2024-10-29 DIAGNOSIS — E05.90 SUBCLINICAL HYPERTHYROIDISM: Primary | ICD-10-CM

## 2024-10-29 NOTE — TELEPHONE ENCOUNTER
----- Message from Sheron Galvez DO sent at 10/29/2024  4:41 PM EDT -----  Low/nml TSH and free T4 - repeat in 3months - order in chart

## 2024-10-30 ENCOUNTER — HOSPITAL ENCOUNTER (EMERGENCY)
Facility: HOSPITAL | Age: 24
Discharge: HOME | End: 2024-10-30
Payer: COMMERCIAL

## 2024-10-30 ENCOUNTER — NURSE TRIAGE (OUTPATIENT)
Age: 24
End: 2024-10-30

## 2024-10-30 VITALS
TEMPERATURE: 98.1 F | RESPIRATION RATE: 17 BRPM | SYSTOLIC BLOOD PRESSURE: 107 MMHG | HEART RATE: 78 BPM | DIASTOLIC BLOOD PRESSURE: 67 MMHG | OXYGEN SATURATION: 99 %

## 2024-10-30 DIAGNOSIS — S06.0XAA CONCUSSION: Primary | ICD-10-CM

## 2024-10-30 PROCEDURE — 99284 EMERGENCY DEPT VISIT MOD MDM: CPT

## 2024-10-30 PROCEDURE — 96372 THER/PROPH/DIAG INJ SC/IM: CPT

## 2024-10-30 PROCEDURE — 99284 EMERGENCY DEPT VISIT MOD MDM: CPT | Performed by: EMERGENCY MEDICINE

## 2024-10-30 RX ORDER — KETOROLAC TROMETHAMINE 30 MG/ML
15 INJECTION, SOLUTION INTRAMUSCULAR; INTRAVENOUS ONCE
Status: COMPLETED | OUTPATIENT
Start: 2024-10-30 | End: 2024-10-30

## 2024-10-30 RX ADMIN — KETOROLAC TROMETHAMINE 15 MG: 30 INJECTION, SOLUTION INTRAMUSCULAR; INTRAVENOUS at 15:57

## 2024-10-30 NOTE — ED ATTENDING ATTESTATION
10/30/2024  I, Con Orozco MD, saw and evaluated the patient. I have discussed the patient with the resident/non-physician practitioner and agree with the resident's/non-physician practitioner's findings, Plan of Care, and MDM as documented in the resident's/non-physician practitioner's note, except where noted. All available labs and Radiology studies were reviewed.  I was present for key portions of any procedure(s) performed by the resident/non-physician practitioner and I was immediately available to provide assistance.       At this point I agree with the current assessment done in the Emergency Department.  I have conducted an independent evaluation of this patient a history and physical is as follows:    S:  Chief Complaint   Patient presents with    Head Injury     Pt reports hitting head on edge of sink yesterday. C/o nausea, headache, dizziness. Denies vomiting, blurred vision. Denies blood thinners, -aspirin. Denies LOC.      Lizeth is a 24 y.o. female who presents with the chief complaint of a headache with associated nausea, and dizziness.  She struck her head against the edge of a sink yesterday and has had symptoms since then.  No vomiting.  She did not lose consciousness.  No ataxia.  No confusion.  She is not on any anticoagulation.    O:  ED Triage Vitals [10/30/24 1514]   Temperature Pulse Respirations Blood Pressure SpO2   98.1 °F (36.7 °C) 78 17 107/67 99 %      Temp Source Heart Rate Source Patient Position - Orthostatic VS BP Location FiO2 (%)   Oral Monitor Sitting Right arm --      Pain Score       8         Physical Exam  Vitals and nursing note reviewed.   Constitutional:       General: She is in acute distress.      Appearance: She is well-developed.   HENT:      Head: Normocephalic and atraumatic.   Eyes:      Pupils: Pupils are equal, round, and reactive to light.   Neck:      Vascular: No JVD.   Cardiovascular:      Rate and Rhythm: Normal rate and regular rhythm.      Heart  sounds: Normal heart sounds. No murmur heard.     No friction rub. No gallop.   Pulmonary:      Effort: Pulmonary effort is normal. No respiratory distress.      Breath sounds: Normal breath sounds. No wheezing or rales.   Chest:      Chest wall: No tenderness.   Musculoskeletal:         General: No tenderness. Normal range of motion.      Cervical back: Normal range of motion.   Skin:     General: Skin is warm and dry.   Neurological:      General: No focal deficit present.      Mental Status: She is alert and oriented to person, place, and time.      GCS: GCS eye subscore is 4. GCS verbal subscore is 5. GCS motor subscore is 6.      Cranial Nerves: No cranial nerve deficit.      Motor: No weakness.      Gait: Gait is intact.   Psychiatric:         Behavior: Behavior normal.         Thought Content: Thought content normal.         Judgment: Judgment normal.       A/P:  Patient presenting with s/s consistent with concussion.  At this time no indication for CT imaging. Will provide symptomatic treatment and concussion clinic referral.    ED Course     Medications   ketorolac (TORADOL) injection 15 mg (has no administration in time range)         Critical Care Time  Procedures    Time reflects when diagnosis was documented in both MDM as applicable and the Disposition within this note       Time User Action Codes Description Comment    10/30/2024  3:51 PM Akhil Norris Add [S06.0XAA] Concussion           ED Disposition       None          Follow-up Information       Follow up With Specialties Details Why Contact Info    Sheron Galvez DO Family Medicine Call  to discuss this visit and schedule close follow-up [address] [facility] [id number] [phone]

## 2024-10-30 NOTE — ED PROVIDER NOTES
Time reflects when diagnosis was documented in both MDM as applicable and the Disposition within this note       Time User Action Codes Description Comment    10/30/2024  3:51 PM Akhil Norris Add [S06.0XAA] Concussion           ED Disposition       ED Disposition   Discharge    Condition   Stable    Date/Time   Wed Oct 30, 2024  3:52 PM    Comment   Lizeth Steineryburn discharge to home/self care.                   Assessment & Plan       Medical Decision Making  This patient presents with a headache most consistent with with sequelae of an underlying concussion. No headache red flags. Neurologic exam without evidence AMS, focal neurologic findings so doubt encephalitis, stroke. Presentation not consistent with acute intracranial bleed to include SAH (lack of risk factors, headache history). Doubt carotid artery dissection given no focal neuro deficits, no neck trauma or recent neck strain. Patient with no signs of increased intracranial pressure. Pain was controlled with ketorolac IM x1 and patient discharged home with PMD follow up and referral to concussion clinic given multiple concussions in the past per patient.       Risk  Prescription drug management.             Medications   ketorolac (TORADOL) injection 15 mg (15 mg Intramuscular Given 10/30/24 1557)       ED Risk Strat Scores                                               History of Present Illness       Chief Complaint   Patient presents with    Head Injury     Pt reports hitting head on edge of sink yesterday. C/o nausea, headache, dizziness. Denies vomiting, blurred vision. Denies blood thinners, -aspirin. Denies LOC.        Past Medical History:   Diagnosis Date    Anxiety     Depression     Miscarriage       Past Surgical History:   Procedure Laterality Date    WISDOM TOOTH EXTRACTION  2021      Family History   Problem Relation Age of Onset    No Known Problems Mother     No Known Problems Father     Migraines Maternal Grandmother     Diabetes  Paternal Grandfather     Cancer Paternal Grandfather     Breast cancer Family 50        MGM's sister     Cervical cancer Neg Hx     Colon cancer Neg Hx     Ovarian cancer Neg Hx     Uterine cancer Neg Hx     Sudden death Neg Hx       Social History     Tobacco Use    Smoking status: Never    Smokeless tobacco: Never   Vaping Use    Vaping status: Never Used   Substance Use Topics    Alcohol use: Not Currently    Drug use: Never     Types: Marijuana      E-Cigarette/Vaping    E-Cigarette Use Never User       E-Cigarette/Vaping Substances    Nicotine No     THC No     CBD No     Flavoring No     Other No     Unknown No       I have reviewed and agree with the history as documented.     Pt is a 24 y.o. female who presents to the ED on October 30, 2024. Patient presents with an injury.  Patient reported hitting her head on the edge of the sink last night, conical fall, slipped on associated nausea, headache, photophobia, sound aversion.  Denies loss of consciousness, blurry vision, vomiting, back pain, vertigo.  Not on blood thinners.  Has complaints of paraspinal neck tenderness.  Took single dose of tylenol 1g earlier today, with minimal relief. Patient states she's had prior concussion before after hitting her head previously. No other symptoms at this time.          Review of Systems   Constitutional:  Negative for activity change and fever.   Gastrointestinal:  Negative for nausea and vomiting.   Musculoskeletal:  Negative for arthralgias, back pain, neck pain and neck stiffness.   Neurological:  Positive for headaches. Negative for tremors, seizures, speech difficulty, weakness, light-headedness and numbness.           Objective       ED Triage Vitals [10/30/24 1514]   Temperature Pulse Blood Pressure Respirations SpO2 Patient Position - Orthostatic VS   98.1 °F (36.7 °C) 78 107/67 17 99 % Sitting      Temp Source Heart Rate Source BP Location FiO2 (%) Pain Score    Oral Monitor Right arm -- 8      Vitals       Date and Time Temp Pulse SpO2 Resp BP Pain Score FACES Pain Rating User   10/30/24 1514 98.1 °F (36.7 °C) 78 99 % 17 107/67 8 -- RL            Physical Exam  Vitals and nursing note reviewed.   Constitutional:       General: She is not in acute distress.     Appearance: She is well-developed.   HENT:      Head: Normocephalic and atraumatic. No raccoon eyes, Zuñiga's sign, abrasion, right periorbital erythema, left periorbital erythema or laceration.   Eyes:      Conjunctiva/sclera: Conjunctivae normal.   Cardiovascular:      Rate and Rhythm: Normal rate and regular rhythm.      Heart sounds: No murmur heard.  Pulmonary:      Effort: Pulmonary effort is normal. No respiratory distress.      Breath sounds: Normal breath sounds.   Abdominal:      Palpations: Abdomen is soft.      Tenderness: There is no abdominal tenderness.   Musculoskeletal:         General: No swelling.      Cervical back: Neck supple.   Skin:     General: Skin is warm and dry.      Capillary Refill: Capillary refill takes less than 2 seconds.   Neurological:      Mental Status: She is alert.   Psychiatric:         Mood and Affect: Mood normal.         Results Reviewed       None            No orders to display       Procedures    ED Medication and Procedure Management   Prior to Admission Medications   Prescriptions Last Dose Informant Patient Reported? Taking?   Prenatal Multivit-Min-Fe-FA (PRE- FORMULA PO)   Yes No   Sig: Take by mouth   acetaminophen (TYLENOL) 325 mg tablet   No No   Sig: Take 2 tablets (650 mg total) by mouth every 6 (six) hours as needed for mild pain or headaches   betamethasone valerate (VALISONE) 0.1 % cream   No No   Sig: Apply topically 2 (two) times a day   hydrocortisone 1 % cream   No No   Sig: Apply 1 Application topically daily as needed for irritation   ibuprofen (MOTRIN) 600 mg tablet   No No   Sig: Take 1 tablet (600 mg total) by mouth every 6 (six) hours as needed for moderate pain (cramping)    ondansetron (ZOFRAN-ODT) 4 mg disintegrating tablet   No No   Sig: Take 1 tablet (4 mg total) by mouth every 6 (six) hours as needed for nausea or vomiting   witch hazel-glycerin (TUCKS) topical pad   No No   Sig: Apply 1 Pad topically every 4 (four) hours as needed for irritation   Patient not taking: Reported on 2024      Facility-Administered Medications: None     Discharge Medication List as of 10/30/2024  4:00 PM        CONTINUE these medications which have NOT CHANGED    Details   acetaminophen (TYLENOL) 325 mg tablet Take 2 tablets (650 mg total) by mouth every 6 (six) hours as needed for mild pain or headaches, Starting 2024, No Print      betamethasone valerate (VALISONE) 0.1 % cream Apply topically 2 (two) times a day, Starting Fri 10/4/2024, Normal      hydrocortisone 1 % cream Apply 1 Application topically daily as needed for irritation, Starting 2024, No Print      ibuprofen (MOTRIN) 600 mg tablet Take 1 tablet (600 mg total) by mouth every 6 (six) hours as needed for moderate pain (cramping), Starting 2024, No Print      ondansetron (ZOFRAN-ODT) 4 mg disintegrating tablet Take 1 tablet (4 mg total) by mouth every 6 (six) hours as needed for nausea or vomiting, Starting 2024, Normal      Prenatal Multivit-Min-Fe-FA (PRE- FORMULA PO) Take by mouth, Historical Med      witch hazel-glycerin (TUCKS) topical pad Apply 1 Pad topically every 4 (four) hours as needed for irritation, Starting 2024, No Print             ED SEPSIS DOCUMENTATION   Time reflects when diagnosis was documented in both MDM as applicable and the Disposition within this note       Time User Action Codes Description Comment    10/30/2024  3:51 PM Akhil Norris Add [S06.0XAA] Concussion                  Akhil Norris MD  10/30/24 1600       Akhil Norris MD  10/30/24 1921       Akhil Norris MD  10/30/24 1922

## 2024-11-04 ENCOUNTER — OFFICE VISIT (OUTPATIENT)
Dept: FAMILY MEDICINE CLINIC | Facility: CLINIC | Age: 24
End: 2024-11-04
Payer: COMMERCIAL

## 2024-11-04 VITALS
SYSTOLIC BLOOD PRESSURE: 122 MMHG | OXYGEN SATURATION: 98 % | HEART RATE: 64 BPM | BODY MASS INDEX: 32.02 KG/M2 | HEIGHT: 62 IN | DIASTOLIC BLOOD PRESSURE: 76 MMHG | WEIGHT: 174 LBS

## 2024-11-04 DIAGNOSIS — S06.0X0A CONCUSSION WITHOUT LOSS OF CONSCIOUSNESS, INITIAL ENCOUNTER: ICD-10-CM

## 2024-11-04 DIAGNOSIS — B36.0 TINEA VERSICOLOR: Primary | ICD-10-CM

## 2024-11-04 PROBLEM — E05.90 HYPERTHYROIDISM: Status: ACTIVE | Noted: 2024-11-04

## 2024-11-04 PROCEDURE — 99214 OFFICE O/P EST MOD 30 MIN: CPT | Performed by: FAMILY MEDICINE

## 2024-11-04 RX ORDER — CLOTRIMAZOLE 1 %
CREAM (GRAM) TOPICAL 2 TIMES DAILY
Qty: 40 G | Refills: 0 | Status: SHIPPED | OUTPATIENT
Start: 2024-11-04 | End: 2024-11-18

## 2024-11-04 NOTE — PROGRESS NOTES
Subjective:      Patient ID: Lizeth Birch is a 24 y.o. female.    Rash  This is a new problem. The current episode started more than 1 month ago. The problem has been gradually worsening since onset. The affected locations include the right arm. The problem is mild. The rash is characterized by redness and itchiness. She was exposed to nothing. Past treatments include nothing.   Patient is also following up from the ER, evaluated for symptoms of headache which started after patient hit her head on the edge of the sink accidentally.  Patient states that she has been taking Tylenol twice daily.  Symptoms have improved but headache is persisting.  Patient has not experienced any nausea or vomiting, dizziness or loss of balance.    Past Medical History:   Diagnosis Date    Anxiety     Depression     Miscarriage        Family History   Problem Relation Age of Onset    No Known Problems Mother     No Known Problems Father     Migraines Maternal Grandmother     Diabetes Paternal Grandfather     Cancer Paternal Grandfather     Breast cancer Family 50        MGM's sister     Cervical cancer Neg Hx     Colon cancer Neg Hx     Ovarian cancer Neg Hx     Uterine cancer Neg Hx     Sudden death Neg Hx        Past Surgical History:   Procedure Laterality Date    WISDOM TOOTH EXTRACTION  2021        reports that she has never smoked. She has never used smokeless tobacco. She reports that she does not currently use alcohol. She reports that she does not use drugs.      Current Outpatient Medications:     acetaminophen (TYLENOL) 325 mg tablet, Take 2 tablets (650 mg total) by mouth every 6 (six) hours as needed for mild pain or headaches, Disp: , Rfl:     betamethasone valerate (VALISONE) 0.1 % cream, Apply topically 2 (two) times a day, Disp: 45 g, Rfl: 0    hydrocortisone 1 % cream, Apply 1 Application topically daily as needed for irritation, Disp: , Rfl:     ibuprofen (MOTRIN) 600 mg tablet, Take 1 tablet (600 mg total) by  "mouth every 6 (six) hours as needed for moderate pain (cramping), Disp: , Rfl:     ondansetron (ZOFRAN-ODT) 4 mg disintegrating tablet, Take 1 tablet (4 mg total) by mouth every 6 (six) hours as needed for nausea or vomiting, Disp: 20 tablet, Rfl: 0    Prenatal Multivit-Min-Fe-FA (PRE- FORMULA PO), Take by mouth, Disp: , Rfl:     witch hazel-glycerin (TUCKS) topical pad, Apply 1 Pad topically every 4 (four) hours as needed for irritation (Patient not taking: Reported on 2024), Disp: , Rfl:     The following portions of the patient's history were reviewed and updated as appropriate: allergies, current medications, past family history, past medical history, past social history, past surgical history and problem list.    Review of Systems   Constitutional: Negative.    Skin:  Positive for rash.   Neurological:  Positive for headaches.           Objective:    /76   Pulse 64   Ht 5' 2\" (1.575 m)   Wt 78.9 kg (174 lb)   LMP 10/15/2024 (Exact Date)   SpO2 98%   BMI 31.83 kg/m²      Physical Exam  Constitutional:       Appearance: Normal appearance.   Eyes:      Extraocular Movements: Extraocular movements intact.      Pupils: Pupils are equal, round, and reactive to light.      Comments: Accommodation-5 inches           Recent Results (from the past 1008 hour(s))   TSH, 3rd generation with Free T4 reflex    Collection Time: 10/26/24 10:15 AM   Result Value Ref Range    TSH 3RD GENERATON 0.449 (L) 0.450 - 4.500 uIU/mL   T4, free    Collection Time: 10/26/24 10:15 AM   Result Value Ref Range    Free T4 0.84 0.61 - 1.12 ng/dL       Assessment/Plan:    No problem-specific Assessment & Plan notes found for this encounter.           Problem List Items Addressed This Visit          Nervous and Auditory    Concussion with no loss of consciousness     Overall symptoms have improved in the past few days.  Patient advised to continue with conservative treatment as advised at the ER.  Can take Tylenol up to 3 " times per day as needed.   Return to ER parameters discussed with patient.            Musculoskeletal and Integument    Tinea versicolor - Primary    Relevant Medications    clotrimazole (LOTRIMIN) 1 % cream

## 2024-11-04 NOTE — ASSESSMENT & PLAN NOTE
Overall symptoms have improved in the past few days.  Patient advised to continue with conservative treatment as advised at the ER.  Can take Tylenol up to 3 times per day as needed.   Return to ER parameters discussed with patient.

## 2024-11-14 ENCOUNTER — TELEPHONE (OUTPATIENT)
Age: 24
End: 2024-11-14

## 2024-11-14 ENCOUNTER — OFFICE VISIT (OUTPATIENT)
Dept: URGENT CARE | Age: 24
End: 2024-11-14
Payer: COMMERCIAL

## 2024-11-14 VITALS
BODY MASS INDEX: 32.3 KG/M2 | HEIGHT: 62 IN | WEIGHT: 175.5 LBS | SYSTOLIC BLOOD PRESSURE: 113 MMHG | OXYGEN SATURATION: 96 % | HEART RATE: 75 BPM | DIASTOLIC BLOOD PRESSURE: 66 MMHG | TEMPERATURE: 97.5 F

## 2024-11-14 DIAGNOSIS — R52 BODY ACHES: Primary | ICD-10-CM

## 2024-11-14 DIAGNOSIS — R21 RASH: ICD-10-CM

## 2024-11-14 DIAGNOSIS — J02.9 SORE THROAT: ICD-10-CM

## 2024-11-14 PROCEDURE — 99213 OFFICE O/P EST LOW 20 MIN: CPT | Performed by: STUDENT IN AN ORGANIZED HEALTH CARE EDUCATION/TRAINING PROGRAM

## 2024-11-14 NOTE — PROGRESS NOTES
[facility]        NAME: Lizeth Birch is a 24 y.o. female  : 2000    MRN: [id number]  DATE: 2024  TIME: 4:41 PM    Assessment and Plan   Body aches [R52]  1. Body aches        2. Sore throat        3. Rash              Patient Instructions   Your rapid strep test was negative.  Please continue with the antifungal lotion on your rash.  It can take several weeks for this to fully resolve.  If you feel it is not completely resolving after 2-3 more weeks of treatment, please follow-up for recheck with your PCP.  The body aches and fatigue could be coming from a viral illness.  Please focus on good rest, good hydration, I recommend adding electrolyte beverage such as Pedialyte, Gatorade, liquid IV.  Your symptoms are not fully resolving, please follow-up with your PCP for recheck.  If you have any severe worsening symptoms, please go to the ER.    Follow up with PCP in 3-5 days.  Proceed to  ER if symptoms worsen.    If tests have been performed at [facility], our office will contact you with results if changes need to be made to the care plan discussed with you at the visit.  You can review your full results on [facility].    Chief Complaint     Chief Complaint   Patient presents with    Rash     Pt is complaining of a rash. Rash started 2 months ago. Pt has seen pcp for rash but it's not going away.    Fatigue    redness in throat     Pt is complaining of redness in throat, body aches and fatigue. Symptoms started a week ago.         History of Present Illness       Patient presents for 2 concerns.  First, she has been dealing with a rash for about 2 months.  Initially treated with steroids, did not improve much.  More recently saw her PCP last week for the rash and was started on clotrimazole for suspected tinea.  Feels that it is gradually improving with this.  She was unsure if it was related to her other symptoms.  She has also been experiencing bodyaches and fatigue for the  last week.  No fevers, chills.  She had 1 day of feeling as though tonsils were tight/swollen.  This has not resolved, she is not feeling any sore throat but when she looked in her throat it appeared more red to her.  No significant congestion, cough, other symptoms.    Fatigue        Review of Systems   Review of Systems   All other systems reviewed and are negative.        Current Medications       Current Outpatient Medications:     acetaminophen (TYLENOL) 325 mg tablet, Take 2 tablets (650 mg total) by mouth every 6 (six) hours as needed for mild pain or headaches, Disp: , Rfl:     betamethasone valerate (VALISONE) 0.1 % cream, Apply topically 2 (two) times a day, Disp: 45 g, Rfl: 0    clotrimazole (LOTRIMIN) 1 % cream, Apply topically 2 (two) times a day for 14 days, Disp: 40 g, Rfl: 0    hydrocortisone 1 % cream, Apply 1 Application topically daily as needed for irritation, Disp: , Rfl:     ibuprofen (MOTRIN) 600 mg tablet, Take 1 tablet (600 mg total) by mouth every 6 (six) hours as needed for moderate pain (cramping), Disp: , Rfl:     ondansetron (ZOFRAN-ODT) 4 mg disintegrating tablet, Take 1 tablet (4 mg total) by mouth every 6 (six) hours as needed for nausea or vomiting, Disp: 20 tablet, Rfl: 0    Prenatal Multivit-Min-Fe-FA (PRE-EMIR FORMULA PO), Take by mouth, Disp: , Rfl:     witch hazel-glycerin (TUCKS) topical pad, Apply 1 Pad topically every 4 (four) hours as needed for irritation (Patient not taking: Reported on 2024), Disp: , Rfl:     Current Allergies     Allergies as of 2024    (No Known Allergies)            The following portions of the patient's history were reviewed and updated as appropriate: allergies, current medications, past family history, past medical history, past social history, past surgical history and problem list.     Past Medical History:   Diagnosis Date    Anxiety     Depression     Miscarriage        Past Surgical History:   Procedure Laterality Date    SHASTA  "TOOTH EXTRACTION  2021       Family History   Problem Relation Age of Onset    No Known Problems Mother     No Known Problems Father     Migraines Maternal Grandmother     Diabetes Paternal Grandfather     Cancer Paternal Grandfather     Breast cancer Family 50        MGM's sister     Cervical cancer Neg Hx     Colon cancer Neg Hx     Ovarian cancer Neg Hx     Uterine cancer Neg Hx     Sudden death Neg Hx          Medications have been verified.        Objective   /66 (BP Location: Left arm, Patient Position: Sitting)   Pulse 75   Temp 97.5 °F (36.4 °C)   Ht 5' 2\" (1.575 m)   Wt 79.6 kg (175 lb 8 oz)   LMP 10/15/2024 (Exact Date)   SpO2 96%   BMI 32.10 kg/m²   Patient's last menstrual period was 10/15/2024 (exact date).       Physical Exam     Physical Exam  Vitals and nursing note reviewed.   Constitutional:       General: She is not in acute distress.     Appearance: Normal appearance. She is not ill-appearing or toxic-appearing.   HENT:      Head: Normocephalic and atraumatic.      Right Ear: Tympanic membrane, ear canal and external ear normal.      Left Ear: Tympanic membrane, ear canal and external ear normal.      Nose: Nose normal.      Mouth/Throat:      Mouth: Mucous membranes are moist.      Pharynx: No oropharyngeal exudate or posterior oropharyngeal erythema.      Comments: Uvula midline, no erythema, no exudate  Eyes:      Extraocular Movements: Extraocular movements intact.   Cardiovascular:      Rate and Rhythm: Normal rate and regular rhythm.      Heart sounds: Normal heart sounds.   Pulmonary:      Effort: Pulmonary effort is normal. No respiratory distress.      Breath sounds: Normal breath sounds. No stridor. No wheezing, rhonchi or rales.   Skin:     General: Skin is warm and dry.      Capillary Refill: Capillary refill takes less than 2 seconds.      Findings: Rash present.      Comments: Circular dry flaking rash, pink raised borders, some central clearing   Neurological:      " Mental Status: She is alert.      Gait: Gait normal.   Psychiatric:         Behavior: Behavior normal.

## 2024-11-14 NOTE — TELEPHONE ENCOUNTER
"Pt called to say she still has the rash and is not feeling well with fatigue and body aches. She states her throat has been \"red\" for the past few days too. Pt would like to have labs done, specifically for mono and lyme and anything else appropriate. She uses [facility].   "

## 2024-11-14 NOTE — PATIENT INSTRUCTIONS
Your rapid strep test was negative.  Please continue with the antifungal lotion on your rash.  It can take several weeks for this to fully resolve.  If you feel it is not completely resolving after 2-3 more weeks of treatment, please follow-up for recheck with your PCP.  The body aches and fatigue could be coming from a viral illness.  Please focus on good rest, good hydration, I recommend adding electrolyte beverage such as Pedialyte, Gatorade, liquid IV.  Your symptoms are not fully resolving, please follow-up with your PCP for recheck.  If you have any severe worsening symptoms, please go to the ER.